# Patient Record
Sex: FEMALE | Race: WHITE | NOT HISPANIC OR LATINO | Employment: FULL TIME | URBAN - NONMETROPOLITAN AREA
[De-identification: names, ages, dates, MRNs, and addresses within clinical notes are randomized per-mention and may not be internally consistent; named-entity substitution may affect disease eponyms.]

---

## 2017-09-05 ENCOUNTER — OFFICE VISIT (OUTPATIENT)
Dept: OBSTETRICS AND GYNECOLOGY | Facility: CLINIC | Age: 37
End: 2017-09-05

## 2017-09-05 VITALS
BODY MASS INDEX: 25.16 KG/M2 | HEIGHT: 65 IN | WEIGHT: 151 LBS | DIASTOLIC BLOOD PRESSURE: 74 MMHG | SYSTOLIC BLOOD PRESSURE: 130 MMHG

## 2017-09-05 DIAGNOSIS — N89.8 VAGINAL CYST: Primary | ICD-10-CM

## 2017-09-05 PROCEDURE — 99213 OFFICE O/P EST LOW 20 MIN: CPT | Performed by: OBSTETRICS & GYNECOLOGY

## 2018-03-13 ENCOUNTER — TELEPHONE (OUTPATIENT)
Dept: NEUROSURGERY | Age: 38
End: 2018-03-13

## 2018-03-13 NOTE — TELEPHONE ENCOUNTER
Neurosurgical pre apt questionnaire     Referring physician? [facility]    Who is completing questionnaire? PATIENT     Has the pt had any previous spinal/brain surgeries? NO    If yes, Where was the surgery preformed? What was the surgery? Who was the surgeon? When was this surgery? Why is the pt not following up with previous surgeon? Is this a second opinion? Was a MRI preformed? NO    If not, Is there any reason a MRI cannot be performed? Is there metal anywhere in the body? If yes,  Where was the MRI preformed? What part of the body? When was it preformed? Note:If  was not the facility that performed the study,    the disc will need to be brought appoint. Physical Therapy? [facility]   If yes, where was PT completed? What is the duration of therapy? Pain Management? NO   If yes, where was pain mgt therapy? Is the patient still under contract with pain mgt? Who is the pain mgt physician? Is the pt currently taking anything for pain control? NOTHING     Employment Status ? EMPLOYED   What type of employment? [employer]   Has the patient missed work due to pain? 2 WEEKS FOR INITIAL FALL   If unemployed, how long? Are you on disability? Symptoms?         LEFT ARM AND HAND NUMBNESS, STEMS FROM BAD FALL IN [place]

## 2018-03-14 ENCOUNTER — TELEPHONE (OUTPATIENT)
Dept: NEUROSURGERY | Age: 38
End: 2018-03-14

## 2018-03-26 ENCOUNTER — OFFICE VISIT (OUTPATIENT)
Dept: NEUROSURGERY | Age: 38
End: 2018-03-26
Payer: COMMERCIAL

## 2018-03-26 VITALS
OXYGEN SATURATION: 100 % | SYSTOLIC BLOOD PRESSURE: 146 MMHG | WEIGHT: 155 LBS | DIASTOLIC BLOOD PRESSURE: 78 MMHG | BODY MASS INDEX: 25.83 KG/M2 | HEART RATE: 102 BPM | HEIGHT: 65 IN

## 2018-03-26 DIAGNOSIS — R41.840 DIFFICULTY CONCENTRATING: ICD-10-CM

## 2018-03-26 DIAGNOSIS — R20.2 NUMBNESS AND TINGLING IN LEFT HAND: ICD-10-CM

## 2018-03-26 DIAGNOSIS — M54.2 NECK PAIN: ICD-10-CM

## 2018-03-26 DIAGNOSIS — R51.9 FREQUENT HEADACHES: ICD-10-CM

## 2018-03-26 DIAGNOSIS — R20.0 NUMBNESS AND TINGLING IN LEFT HAND: ICD-10-CM

## 2018-03-26 DIAGNOSIS — H53.8 BLURRY VISION, BILATERAL: ICD-10-CM

## 2018-03-26 DIAGNOSIS — R20.0 LEFT ARM NUMBNESS: Primary | ICD-10-CM

## 2018-03-26 PROCEDURE — 99204 OFFICE O/P NEW MOD 45 MIN: CPT | Performed by: NURSE PRACTITIONER

## 2018-03-26 ASSESSMENT — ENCOUNTER SYMPTOMS
GASTROINTESTINAL NEGATIVE: 1
RESPIRATORY NEGATIVE: 1
ORTHOPNEA: 0
EYE PAIN: 0
BLURRED VISION: 1
DOUBLE VISION: 0
EYE REDNESS: 0
EYE DISCHARGE: 0
PHOTOPHOBIA: 0

## 2018-03-26 NOTE — PROGRESS NOTES
intact. No rash, erythema, or pallor. Psychiatric  mood, affect, and behavior appear normal.     Neurologic Examination  Awake, Alert and oriented x 3  Normal speech pattern, following commands  Motor 5/5 all extremities  No deficits to light touch or pinprick sensation  Reflexes are 3+ and symmetric BUE (slight)  No clonus or Hoffmans sign  No myofacial tenderness to palpation  Normal Gait pattern  Can walk in Tandem      DATA and IMAGING:    Nursing/pcp notes, imaging, labs, and vitals reviewed. PT,OT and/or speech notes reviewed    Lab Results   Component Value Date    HGB 11.1 (L) 01/03/2011    HCT 33.3 (L) 01/03/2011   No results found for: NA, K, CL, CO2, BUN, CREATININE, GLUCOSE, CALCIUM, PROT, LABALBU, BILITOT, ALKPHOS, AST, ALT, LABGLOM, GFRAA, AGRATIO, GLOBNo results found for: INR, PROTIME    No images to view at today's visit. ASSESSMENT:    Roslyn Vazquez is a 40 y.o. female with left arm pain, numbness, and tingling with mild neck pain. ICD-10-CM ICD-9-CM    1. Left arm numbness R20.0 782.0 MRI Cervical Spine WO Contrast   2. Numbness and tingling in left hand R20.0 782.0 MRI Cervical Spine WO Contrast    R20.2     3. Neck pain M54.2 723.1 MRI Cervical Spine WO Contrast   4. Frequent headaches R51 784.0 CT HEAD WO CONTRAST      MRI Cervical Spine WO Contrast   5. Blurry vision, bilateral H53.8 368.8 CT HEAD WO CONTRAST   6. Difficulty concentrating R41.840 799.51 CT HEAD WO CONTRAST       PLAN:  -Obtain MRI cervical (OPEN)  -Follow up in 1 months       Note: A total of >50% (23 minutes) of 45 minutes was spent discussing the pathophysiology and treatment and/or coordination of care of the above diagnoses.       JASWANT Rodas

## 2018-04-16 ENCOUNTER — TELEPHONE (OUTPATIENT)
Dept: NEUROSURGERY | Age: 38
End: 2018-04-16

## 2018-04-20 ENCOUNTER — TELEPHONE (OUTPATIENT)
Dept: NEUROSURGERY | Age: 38
End: 2018-04-20

## 2018-05-04 ENCOUNTER — HOSPITAL ENCOUNTER (OUTPATIENT)
Dept: MRI IMAGING | Age: 38
Discharge: HOME | End: 2018-05-04
Payer: COMMERCIAL

## 2018-05-04 ENCOUNTER — HOSPITAL ENCOUNTER (OUTPATIENT)
Dept: CT IMAGING | Age: 38
Discharge: HOME | End: 2018-05-04
Payer: COMMERCIAL

## 2018-05-04 DIAGNOSIS — R51.9 FREQUENT HEADACHES: ICD-10-CM

## 2018-05-04 DIAGNOSIS — H53.8 BLURRY VISION, BILATERAL: ICD-10-CM

## 2018-05-04 DIAGNOSIS — R20.0 NUMBNESS AND TINGLING IN LEFT HAND: ICD-10-CM

## 2018-05-04 DIAGNOSIS — R41.840 DIFFICULTY CONCENTRATING: ICD-10-CM

## 2018-05-04 DIAGNOSIS — M54.2 NECK PAIN: ICD-10-CM

## 2018-05-04 DIAGNOSIS — R20.2 NUMBNESS AND TINGLING IN LEFT HAND: ICD-10-CM

## 2018-05-04 DIAGNOSIS — R20.0 LEFT ARM NUMBNESS: ICD-10-CM

## 2018-05-04 PROCEDURE — 70450 CT HEAD/BRAIN W/O DYE: CPT

## 2018-05-04 PROCEDURE — 72141 MRI NECK SPINE W/O DYE: CPT

## 2018-05-08 ENCOUNTER — TELEPHONE (OUTPATIENT)
Dept: NEUROLOGY | Age: 38
End: 2018-05-08

## 2018-05-18 ENCOUNTER — TELEPHONE (OUTPATIENT)
Dept: NEUROSURGERY | Age: 38
End: 2018-05-18

## 2018-05-18 DIAGNOSIS — R20.0 NUMBNESS AND TINGLING IN LEFT HAND: ICD-10-CM

## 2018-05-18 DIAGNOSIS — H53.8 BLURRY VISION, BILATERAL: ICD-10-CM

## 2018-05-18 DIAGNOSIS — M54.2 NECK PAIN: ICD-10-CM

## 2018-05-18 DIAGNOSIS — R20.2 NUMBNESS AND TINGLING IN LEFT HAND: ICD-10-CM

## 2018-05-18 DIAGNOSIS — R41.840 DIFFICULTY CONCENTRATING: ICD-10-CM

## 2018-05-18 DIAGNOSIS — R20.0 LEFT ARM NUMBNESS: Primary | ICD-10-CM

## 2018-05-18 DIAGNOSIS — R51.9 FREQUENT HEADACHES: ICD-10-CM

## 2018-05-24 ENCOUNTER — TELEPHONE (OUTPATIENT)
Dept: NEUROSURGERY | Age: 38
End: 2018-05-24

## 2018-06-04 ENCOUNTER — OFFICE VISIT (OUTPATIENT)
Dept: NEUROSURGERY | Age: 38
End: 2018-06-04
Payer: COMMERCIAL

## 2018-06-04 ENCOUNTER — TELEPHONE (OUTPATIENT)
Dept: NEUROSURGERY | Age: 38
End: 2018-06-04

## 2018-06-04 ENCOUNTER — OFFICE VISIT (OUTPATIENT)
Dept: OBSTETRICS AND GYNECOLOGY | Facility: CLINIC | Age: 38
End: 2018-06-04

## 2018-06-04 VITALS
BODY MASS INDEX: 28.66 KG/M2 | DIASTOLIC BLOOD PRESSURE: 76 MMHG | HEIGHT: 65 IN | SYSTOLIC BLOOD PRESSURE: 120 MMHG | WEIGHT: 172 LBS

## 2018-06-04 VITALS
SYSTOLIC BLOOD PRESSURE: 122 MMHG | WEIGHT: 174.2 LBS | HEART RATE: 70 BPM | OXYGEN SATURATION: 99 % | DIASTOLIC BLOOD PRESSURE: 78 MMHG | HEIGHT: 65 IN | BODY MASS INDEX: 29.02 KG/M2

## 2018-06-04 DIAGNOSIS — S06.0X0A CONCUSSION WITHOUT LOSS OF CONSCIOUSNESS, INITIAL ENCOUNTER: Primary | ICD-10-CM

## 2018-06-04 DIAGNOSIS — R20.2 NUMBNESS AND TINGLING IN LEFT HAND: ICD-10-CM

## 2018-06-04 DIAGNOSIS — M54.2 NECK PAIN: ICD-10-CM

## 2018-06-04 DIAGNOSIS — R20.0 NUMBNESS AND TINGLING IN LEFT HAND: ICD-10-CM

## 2018-06-04 DIAGNOSIS — R51.9 FREQUENT HEADACHES: ICD-10-CM

## 2018-06-04 DIAGNOSIS — E53.8 B12 DEFICIENCY: Primary | ICD-10-CM

## 2018-06-04 DIAGNOSIS — S06.0X0A CONCUSSION WITHOUT LOSS OF CONSCIOUSNESS, INITIAL ENCOUNTER: ICD-10-CM

## 2018-06-04 DIAGNOSIS — R20.0 LEFT ARM NUMBNESS: ICD-10-CM

## 2018-06-04 DIAGNOSIS — R41.840 DIFFICULTY CONCENTRATING: ICD-10-CM

## 2018-06-04 DIAGNOSIS — H53.8 BLURRY VISION, BILATERAL: ICD-10-CM

## 2018-06-04 DIAGNOSIS — N89.8 VAGINAL INCLUSION CYST: Primary | ICD-10-CM

## 2018-06-04 LAB
ALBUMIN SERPL-MCNC: 4.8 G/DL (ref 3.5–5.2)
ALP BLD-CCNC: 49 U/L (ref 35–104)
ALT SERPL-CCNC: 11 U/L (ref 5–33)
ANION GAP SERPL CALCULATED.3IONS-SCNC: 17 MMOL/L (ref 7–19)
AST SERPL-CCNC: 13 U/L (ref 5–32)
BASOPHILS ABSOLUTE: 0 K/UL (ref 0–0.2)
BASOPHILS RELATIVE PERCENT: 0.7 % (ref 0–1)
BILIRUB SERPL-MCNC: 0.3 MG/DL (ref 0.2–1.2)
BUN BLDV-MCNC: 20 MG/DL (ref 6–20)
C-REACTIVE PROTEIN: 0.26 MG/DL (ref 0–0.5)
CALCIUM SERPL-MCNC: 9.7 MG/DL (ref 8.6–10)
CHLORIDE BLD-SCNC: 98 MMOL/L (ref 98–111)
CO2: 26 MMOL/L (ref 22–29)
CREAT SERPL-MCNC: 0.8 MG/DL (ref 0.5–0.9)
EOSINOPHILS ABSOLUTE: 0.1 K/UL (ref 0–0.6)
EOSINOPHILS RELATIVE PERCENT: 1.1 % (ref 0–5)
GFR NON-AFRICAN AMERICAN: >60
GLUCOSE BLD-MCNC: 97 MG/DL (ref 74–109)
HCT VFR BLD CALC: 41.2 % (ref 37–47)
HEMOGLOBIN: 13.4 G/DL (ref 12–16)
LYMPHOCYTES ABSOLUTE: 1.1 K/UL (ref 1.1–4.5)
LYMPHOCYTES RELATIVE PERCENT: 20 % (ref 20–40)
MCH RBC QN AUTO: 30.3 PG (ref 27–31)
MCHC RBC AUTO-ENTMCNC: 32.5 G/DL (ref 33–37)
MCV RBC AUTO: 93.2 FL (ref 81–99)
MONOCYTES ABSOLUTE: 0.3 K/UL (ref 0–0.9)
MONOCYTES RELATIVE PERCENT: 6 % (ref 0–10)
NEUTROPHILS ABSOLUTE: 4 K/UL (ref 1.5–7.5)
NEUTROPHILS RELATIVE PERCENT: 71.7 % (ref 50–65)
PDW BLD-RTO: 11.8 % (ref 11.5–14.5)
PLATELET # BLD: 273 K/UL (ref 130–400)
PMV BLD AUTO: 10.2 FL (ref 9.4–12.3)
POTASSIUM SERPL-SCNC: 3.7 MMOL/L (ref 3.5–5)
RBC # BLD: 4.42 M/UL (ref 4.2–5.4)
RHEUMATOID FACTOR: <10 IU/ML
RPR: NORMAL
SEDIMENTATION RATE, ERYTHROCYTE: 11 MM/HR (ref 0–20)
SODIUM BLD-SCNC: 141 MMOL/L (ref 136–145)
T4 FREE: 1.1 NG/DL (ref 0.9–1.7)
TOTAL PROTEIN: 7.9 G/DL (ref 6.6–8.7)
TSH SERPL DL<=0.05 MIU/L-ACNC: 1.21 UIU/ML (ref 0.27–4.2)
VITAMIN B-12: 321 PG/ML (ref 211–946)
WBC # BLD: 5.5 K/UL (ref 4.8–10.8)

## 2018-06-04 PROCEDURE — 99213 OFFICE O/P EST LOW 20 MIN: CPT | Performed by: NURSE PRACTITIONER

## 2018-06-04 PROCEDURE — 99204 OFFICE O/P NEW MOD 45 MIN: CPT | Performed by: PSYCHIATRY & NEUROLOGY

## 2018-06-04 RX ORDER — CYANOCOBALAMIN 1000 UG/ML
1000 INJECTION INTRAMUSCULAR; SUBCUTANEOUS
Qty: 1 ML | Refills: 3 | Status: SHIPPED | OUTPATIENT
Start: 2018-06-04 | End: 2018-07-09

## 2018-06-04 RX ORDER — CEPHALEXIN 500 MG/1
500 CAPSULE ORAL 4 TIMES DAILY
Qty: 40 CAPSULE | Refills: 0 | Status: SHIPPED | OUTPATIENT
Start: 2018-06-04 | End: 2018-06-14

## 2018-06-06 LAB — ANA IGG, ELISA: DETECTED

## 2018-06-07 LAB
ANA BY IFA: ABNORMAL
ARSENIC BLOOD: <10 UG/L (ref 0–13)
LEAD LEVEL BLOOD: <2 UG/DL (ref 0–4.9)
MERCURY BLOOD: <3 UG/L (ref 0–10)

## 2018-06-14 ENCOUNTER — HOSPITAL ENCOUNTER (OUTPATIENT)
Dept: MRI IMAGING | Age: 38
Discharge: HOME | End: 2018-06-14
Payer: COMMERCIAL

## 2018-06-14 ENCOUNTER — HOSPITAL ENCOUNTER (OUTPATIENT)
Dept: NEUROLOGY | Age: 38
Discharge: HOME | End: 2018-06-14
Payer: COMMERCIAL

## 2018-06-14 DIAGNOSIS — R41.840 DIFFICULTY CONCENTRATING: ICD-10-CM

## 2018-06-14 DIAGNOSIS — M54.2 NECK PAIN: ICD-10-CM

## 2018-06-14 DIAGNOSIS — R20.2 NUMBNESS AND TINGLING IN LEFT HAND: ICD-10-CM

## 2018-06-14 DIAGNOSIS — H53.8 BLURRY VISION, BILATERAL: ICD-10-CM

## 2018-06-14 DIAGNOSIS — R20.0 LEFT ARM NUMBNESS: ICD-10-CM

## 2018-06-14 DIAGNOSIS — R51.9 FREQUENT HEADACHES: ICD-10-CM

## 2018-06-14 DIAGNOSIS — S06.0X0A CONCUSSION WITHOUT LOSS OF CONSCIOUSNESS, INITIAL ENCOUNTER: ICD-10-CM

## 2018-06-14 DIAGNOSIS — R20.0 NUMBNESS AND TINGLING IN LEFT HAND: ICD-10-CM

## 2018-06-14 PROCEDURE — A9577 INJ MULTIHANCE: HCPCS | Performed by: PSYCHIATRY & NEUROLOGY

## 2018-06-14 PROCEDURE — 95913 NRV CNDJ TEST 13/> STUDIES: CPT

## 2018-06-14 PROCEDURE — 70553 MRI BRAIN STEM W/O & W/DYE: CPT

## 2018-06-14 PROCEDURE — 95886 MUSC TEST DONE W/N TEST COMP: CPT

## 2018-06-14 PROCEDURE — 6360000004 HC RX CONTRAST MEDICATION: Performed by: PSYCHIATRY & NEUROLOGY

## 2018-06-14 PROCEDURE — 95913 NRV CNDJ TEST 13/> STUDIES: CPT | Performed by: PSYCHIATRY & NEUROLOGY

## 2018-06-14 PROCEDURE — 95886 MUSC TEST DONE W/N TEST COMP: CPT | Performed by: PSYCHIATRY & NEUROLOGY

## 2018-06-14 RX ADMIN — GADOBENATE DIMEGLUMINE 16 ML: 529 INJECTION, SOLUTION INTRAVENOUS at 12:18

## 2018-06-15 ENCOUNTER — TELEPHONE (OUTPATIENT)
Dept: CARDIOLOGY | Age: 38
End: 2018-06-15

## 2018-06-27 ENCOUNTER — OFFICE VISIT (OUTPATIENT)
Dept: OBSTETRICS AND GYNECOLOGY | Facility: CLINIC | Age: 38
End: 2018-06-27

## 2018-06-27 VITALS
SYSTOLIC BLOOD PRESSURE: 120 MMHG | BODY MASS INDEX: 29.16 KG/M2 | HEIGHT: 65 IN | WEIGHT: 175 LBS | DIASTOLIC BLOOD PRESSURE: 76 MMHG

## 2018-06-27 DIAGNOSIS — Z15.09 GENETIC PREDISPOSITION TO CANCER: ICD-10-CM

## 2018-06-27 DIAGNOSIS — Z12.39 ENCOUNTER FOR SCREENING FOR MALIGNANT NEOPLASM OF BREAST: ICD-10-CM

## 2018-06-27 DIAGNOSIS — Z01.419 WELL WOMAN EXAM WITH ROUTINE GYNECOLOGICAL EXAM: Primary | ICD-10-CM

## 2018-06-27 DIAGNOSIS — Z12.4 CERVICAL CANCER SCREENING: ICD-10-CM

## 2018-06-27 DIAGNOSIS — N89.8 VAGINAL INCLUSION CYST: ICD-10-CM

## 2018-06-27 PROCEDURE — 99395 PREV VISIT EST AGE 18-39: CPT | Performed by: NURSE PRACTITIONER

## 2018-06-27 PROCEDURE — G0123 SCREEN CERV/VAG THIN LAYER: HCPCS | Performed by: NURSE PRACTITIONER

## 2018-06-27 PROCEDURE — 99213 OFFICE O/P EST LOW 20 MIN: CPT | Performed by: NURSE PRACTITIONER

## 2018-06-27 RX ORDER — SYRINGE AND NEEDLE,INSULIN,1ML 25GX1"
SYRINGE, EMPTY DISPOSABLE MISCELLANEOUS
COMMUNITY
Start: 2018-06-05

## 2018-06-27 RX ORDER — CYANOCOBALAMIN 1000 UG/ML
INJECTION, SOLUTION INTRAMUSCULAR; SUBCUTANEOUS
COMMUNITY
Start: 2018-06-05

## 2018-06-27 RX ORDER — CEPHALEXIN 500 MG/1
500 CAPSULE ORAL 4 TIMES DAILY
Qty: 40 CAPSULE | Refills: 0 | Status: SHIPPED | OUTPATIENT
Start: 2018-06-27 | End: 2018-07-07

## 2018-06-27 RX ORDER — CEPHALEXIN 500 MG/1
500 CAPSULE ORAL 4 TIMES DAILY
Qty: 40 CAPSULE | Refills: 0 | Status: SHIPPED | OUTPATIENT
Start: 2018-06-27 | End: 2018-06-27 | Stop reason: SDUPTHER

## 2018-06-29 LAB
GEN CATEG CVX/VAG CYTO-IMP: NORMAL
LAB AP CASE REPORT: NORMAL
LAB AP GYN ADDITIONAL INFORMATION: NORMAL
PATH INTERP SPEC-IMP: NORMAL
STAT OF ADQ CVX/VAG CYTO-IMP: NORMAL

## 2018-07-09 ENCOUNTER — OFFICE VISIT (OUTPATIENT)
Dept: NEUROSURGERY | Age: 38
End: 2018-07-09
Payer: COMMERCIAL

## 2018-07-09 VITALS
SYSTOLIC BLOOD PRESSURE: 108 MMHG | WEIGHT: 178 LBS | OXYGEN SATURATION: 98 % | HEIGHT: 65 IN | BODY MASS INDEX: 29.66 KG/M2 | DIASTOLIC BLOOD PRESSURE: 64 MMHG | HEART RATE: 74 BPM

## 2018-07-09 DIAGNOSIS — F41.9 ANXIETY: ICD-10-CM

## 2018-07-09 DIAGNOSIS — R41.840 DIFFICULTY CONCENTRATING: ICD-10-CM

## 2018-07-09 DIAGNOSIS — R51.9 FREQUENT HEADACHES: ICD-10-CM

## 2018-07-09 DIAGNOSIS — H53.8 BLURRY VISION, BILATERAL: ICD-10-CM

## 2018-07-09 DIAGNOSIS — R20.0 LEFT ARM NUMBNESS: ICD-10-CM

## 2018-07-09 DIAGNOSIS — E53.8 B12 DEFICIENCY: ICD-10-CM

## 2018-07-09 DIAGNOSIS — S06.0X0A CONCUSSION WITHOUT LOSS OF CONSCIOUSNESS, INITIAL ENCOUNTER: Primary | ICD-10-CM

## 2018-07-09 DIAGNOSIS — R20.2 NUMBNESS AND TINGLING IN LEFT HAND: ICD-10-CM

## 2018-07-09 DIAGNOSIS — R20.0 NUMBNESS AND TINGLING IN LEFT HAND: ICD-10-CM

## 2018-07-09 DIAGNOSIS — M54.2 NECK PAIN: ICD-10-CM

## 2018-07-09 PROCEDURE — 99214 OFFICE O/P EST MOD 30 MIN: CPT | Performed by: PSYCHIATRY & NEUROLOGY

## 2018-07-09 RX ORDER — ESCITALOPRAM OXALATE 10 MG/1
10 TABLET ORAL DAILY
Qty: 30 TABLET | Refills: 5 | Status: SHIPPED | OUTPATIENT
Start: 2018-07-09

## 2018-07-09 NOTE — PROGRESS NOTES
labs. Noting more anxiety, no SI/HI. PLAN:  1. Trial of Lexapro 10 mg daily. Psych referral, she may also have possible underlying ADD. 2.  Hold off on Rheumatology evaluation for now as CTD felt unlikely. 3.  B12 replacement.       Polo Norris,   Board Certified Neurology

## 2018-07-17 ENCOUNTER — HOSPITAL ENCOUNTER (OUTPATIENT)
Dept: MAMMOGRAPHY | Facility: HOSPITAL | Age: 38
Discharge: HOME | End: 2018-07-17
Admitting: NURSE PRACTITIONER

## 2018-07-17 PROCEDURE — 77063 BREAST TOMOSYNTHESIS BI: CPT

## 2018-07-17 PROCEDURE — 77067 SCR MAMMO BI INCL CAD: CPT

## 2020-03-12 ENCOUNTER — HOSPITAL ENCOUNTER (EMERGENCY)
Facility: HOSPITAL | Age: 40
Discharge: HOME | End: 2020-03-12
Admitting: EMERGENCY MEDICINE

## 2020-03-12 ENCOUNTER — APPOINTMENT (OUTPATIENT)
Dept: GENERAL RADIOLOGY | Facility: HOSPITAL | Age: 40
End: 2020-03-12

## 2020-03-12 VITALS
WEIGHT: 175 LBS | TEMPERATURE: 98.4 F | RESPIRATION RATE: 16 BRPM | SYSTOLIC BLOOD PRESSURE: 124 MMHG | BODY MASS INDEX: 29.16 KG/M2 | HEART RATE: 71 BPM | OXYGEN SATURATION: 96 % | HEIGHT: 65 IN | DIASTOLIC BLOOD PRESSURE: 75 MMHG

## 2020-03-12 DIAGNOSIS — R07.9 CHEST PAIN, UNSPECIFIED TYPE: Primary | ICD-10-CM

## 2020-03-12 LAB
ALBUMIN SERPL-MCNC: 4.5 G/DL (ref 3.5–5.2)
ALBUMIN/GLOB SERPL: 1.4 G/DL
ALP SERPL-CCNC: 99 U/L (ref 39–117)
ALT SERPL W P-5'-P-CCNC: 11 U/L (ref 1–33)
ANION GAP SERPL CALCULATED.3IONS-SCNC: 12 MMOL/L (ref 5–15)
APTT PPP: 31.8 SECONDS (ref 24.1–35)
AST SERPL-CCNC: 15 U/L (ref 1–32)
BASOPHILS # BLD AUTO: 0.06 10*3/MM3 (ref 0–0.2)
BASOPHILS NFR BLD AUTO: 1.2 % (ref 0–1.5)
BILIRUB SERPL-MCNC: 0.3 MG/DL (ref 0.2–1.2)
BUN BLD-MCNC: 15 MG/DL (ref 6–20)
BUN/CREAT SERPL: 20 (ref 7–25)
CALCIUM SPEC-SCNC: 9.9 MG/DL (ref 8.6–10.5)
CHLORIDE SERPL-SCNC: 100 MMOL/L (ref 98–107)
CO2 SERPL-SCNC: 28 MMOL/L (ref 22–29)
CREAT BLD-MCNC: 0.75 MG/DL (ref 0.57–1)
D DIMER PPP FEU-MCNC: 0.39 MG/L (FEU) (ref 0–0.5)
DEPRECATED RDW RBC AUTO: 38.7 FL (ref 37–54)
EOSINOPHIL # BLD AUTO: 0.12 10*3/MM3 (ref 0–0.4)
EOSINOPHIL NFR BLD AUTO: 2.5 % (ref 0.3–6.2)
ERYTHROCYTE [DISTWIDTH] IN BLOOD BY AUTOMATED COUNT: 11.9 % (ref 12.3–15.4)
GFR SERPL CREATININE-BSD FRML MDRD: 86 ML/MIN/1.73
GLOBULIN UR ELPH-MCNC: 3.3 GM/DL
GLUCOSE BLD-MCNC: 102 MG/DL (ref 65–99)
HCG SERPL QL: NEGATIVE
HCT VFR BLD AUTO: 38.4 % (ref 34–46.6)
HGB BLD-MCNC: 13 G/DL (ref 12–15.9)
HOLD SPECIMEN: NORMAL
HOLD SPECIMEN: NORMAL
IMM GRANULOCYTES # BLD AUTO: 0.01 10*3/MM3 (ref 0–0.05)
IMM GRANULOCYTES NFR BLD AUTO: 0.2 % (ref 0–0.5)
INR PPP: 0.97 (ref 0.91–1.09)
LYMPHOCYTES # BLD AUTO: 1.76 10*3/MM3 (ref 0.7–3.1)
LYMPHOCYTES NFR BLD AUTO: 36.6 % (ref 19.6–45.3)
MCH RBC QN AUTO: 30.4 PG (ref 26.6–33)
MCHC RBC AUTO-ENTMCNC: 33.9 G/DL (ref 31.5–35.7)
MCV RBC AUTO: 89.7 FL (ref 79–97)
MONOCYTES # BLD AUTO: 0.28 10*3/MM3 (ref 0.1–0.9)
MONOCYTES NFR BLD AUTO: 5.8 % (ref 5–12)
NEUTROPHILS # BLD AUTO: 2.58 10*3/MM3 (ref 1.7–7)
NEUTROPHILS NFR BLD AUTO: 53.7 % (ref 42.7–76)
NRBC BLD AUTO-RTO: 0 /100 WBC (ref 0–0.2)
NT-PROBNP SERPL-MCNC: 39 PG/ML (ref 5–450)
PLATELET # BLD AUTO: 314 10*3/MM3 (ref 140–450)
PMV BLD AUTO: 9.3 FL (ref 6–12)
POTASSIUM BLD-SCNC: 3.3 MMOL/L (ref 3.5–5.2)
PROT SERPL-MCNC: 7.8 G/DL (ref 6–8.5)
PROTHROMBIN TIME: 12.8 SECONDS (ref 11.9–14.6)
RBC # BLD AUTO: 4.28 10*6/MM3 (ref 3.77–5.28)
SODIUM BLD-SCNC: 140 MMOL/L (ref 136–145)
TROPONIN T SERPL-MCNC: <0.01 NG/ML (ref 0–0.03)
TROPONIN T SERPL-MCNC: <0.01 NG/ML (ref 0–0.03)
WBC NRBC COR # BLD: 4.81 10*3/MM3 (ref 3.4–10.8)
WHOLE BLOOD HOLD SPECIMEN: NORMAL
WHOLE BLOOD HOLD SPECIMEN: NORMAL

## 2020-03-12 PROCEDURE — 93010 ELECTROCARDIOGRAM REPORT: CPT | Performed by: INTERNAL MEDICINE

## 2020-03-12 PROCEDURE — 96374 THER/PROPH/DIAG INJ IV PUSH: CPT

## 2020-03-12 PROCEDURE — 80053 COMPREHEN METABOLIC PANEL: CPT | Performed by: NURSE PRACTITIONER

## 2020-03-12 PROCEDURE — 85610 PROTHROMBIN TIME: CPT | Performed by: NURSE PRACTITIONER

## 2020-03-12 PROCEDURE — 85730 THROMBOPLASTIN TIME PARTIAL: CPT | Performed by: NURSE PRACTITIONER

## 2020-03-12 PROCEDURE — 93005 ELECTROCARDIOGRAM TRACING: CPT | Performed by: EMERGENCY MEDICINE

## 2020-03-12 PROCEDURE — 99284 EMERGENCY DEPT VISIT MOD MDM: CPT

## 2020-03-12 PROCEDURE — 84703 CHORIONIC GONADOTROPIN ASSAY: CPT

## 2020-03-12 PROCEDURE — 93005 ELECTROCARDIOGRAM TRACING: CPT | Performed by: NURSE PRACTITIONER

## 2020-03-12 PROCEDURE — 85025 COMPLETE CBC W/AUTO DIFF WBC: CPT | Performed by: NURSE PRACTITIONER

## 2020-03-12 PROCEDURE — 71045 X-RAY EXAM CHEST 1 VIEW: CPT

## 2020-03-12 PROCEDURE — 83880 ASSAY OF NATRIURETIC PEPTIDE: CPT | Performed by: NURSE PRACTITIONER

## 2020-03-12 PROCEDURE — 85379 FIBRIN DEGRADATION QUANT: CPT | Performed by: NURSE PRACTITIONER

## 2020-03-12 PROCEDURE — 84484 ASSAY OF TROPONIN QUANT: CPT | Performed by: NURSE PRACTITIONER

## 2020-03-12 PROCEDURE — 25010000002 METOCLOPRAMIDE PER 10 MG: Performed by: PHYSICIAN ASSISTANT

## 2020-03-12 RX ORDER — METOCLOPRAMIDE HYDROCHLORIDE 5 MG/ML
10 INJECTION INTRAMUSCULAR; INTRAVENOUS ONCE
Status: COMPLETED | OUTPATIENT
Start: 2020-03-12 | End: 2020-03-12

## 2020-03-12 RX ORDER — ACETAMINOPHEN 500 MG
1000 TABLET ORAL ONCE
Status: COMPLETED | OUTPATIENT
Start: 2020-03-12 | End: 2020-03-12

## 2020-03-12 RX ADMIN — METOCLOPRAMIDE HYDROCHLORIDE 10 MG: 5 INJECTION INTRAMUSCULAR; INTRAVENOUS at 17:53

## 2020-03-12 RX ADMIN — ACETAMINOPHEN 1000 MG: 500 TABLET, FILM COATED ORAL at 15:44

## 2020-05-04 ENCOUNTER — APPOINTMENT (OUTPATIENT)
Dept: LAB | Facility: HOSPITAL | Age: 40
End: 2020-05-04

## 2020-05-04 ENCOUNTER — OFFICE VISIT (OUTPATIENT)
Dept: ENDOCRINOLOGY | Facility: CLINIC | Age: 40
End: 2020-05-04

## 2020-05-04 VITALS
SYSTOLIC BLOOD PRESSURE: 124 MMHG | DIASTOLIC BLOOD PRESSURE: 78 MMHG | HEIGHT: 65 IN | WEIGHT: 179.5 LBS | BODY MASS INDEX: 29.91 KG/M2

## 2020-05-04 DIAGNOSIS — M84.353A STRESS FRACTURE OF NECK OF FEMUR, INITIAL ENCOUNTER: Primary | ICD-10-CM

## 2020-05-04 LAB
ALP SERPL-CCNC: 67 U/L (ref 39–117)
CALCIUM SPEC-SCNC: 9.7 MG/DL (ref 8.6–10.5)
CORTIS AM PEAK SERPL-MCNC: 10.46 MCG/DL
ESTRADIOL SERPL HS-MCNC: 99 PG/ML
FSH SERPL-ACNC: 7.42 MIU/ML
LH SERPL-ACNC: 11.4 MIU/ML
MAGNESIUM SERPL-MCNC: 1.9 MG/DL (ref 1.6–2.6)
PHOSPHATE SERPL-MCNC: 7.4 MG/DL (ref 2.5–4.5)
PROLACTIN SERPL-MCNC: 10.2 NG/ML (ref 4.79–23.3)
PTH-INTACT SERPL-MCNC: 52.9 PG/ML (ref 15–65)
T4 FREE SERPL-MCNC: 1.11 NG/DL (ref 0.93–1.7)
TSH SERPL DL<=0.05 MIU/L-ACNC: 0.76 UIU/ML (ref 0.27–4.2)

## 2020-05-04 PROCEDURE — 86376 MICROSOMAL ANTIBODY EACH: CPT | Performed by: INTERNAL MEDICINE

## 2020-05-04 PROCEDURE — 82670 ASSAY OF TOTAL ESTRADIOL: CPT | Performed by: INTERNAL MEDICINE

## 2020-05-04 PROCEDURE — 84100 ASSAY OF PHOSPHORUS: CPT | Performed by: INTERNAL MEDICINE

## 2020-05-04 PROCEDURE — 84439 ASSAY OF FREE THYROXINE: CPT | Performed by: INTERNAL MEDICINE

## 2020-05-04 PROCEDURE — 84305 ASSAY OF SOMATOMEDIN: CPT | Performed by: INTERNAL MEDICINE

## 2020-05-04 PROCEDURE — 83937 ASSAY OF OSTEOCALCIN: CPT | Performed by: INTERNAL MEDICINE

## 2020-05-04 PROCEDURE — 84146 ASSAY OF PROLACTIN: CPT | Performed by: INTERNAL MEDICINE

## 2020-05-04 PROCEDURE — 82533 TOTAL CORTISOL: CPT | Performed by: INTERNAL MEDICINE

## 2020-05-04 PROCEDURE — 84075 ASSAY ALKALINE PHOSPHATASE: CPT | Performed by: INTERNAL MEDICINE

## 2020-05-04 PROCEDURE — 82652 VIT D 1 25-DIHYDROXY: CPT | Performed by: INTERNAL MEDICINE

## 2020-05-04 PROCEDURE — 84443 ASSAY THYROID STIM HORMONE: CPT | Performed by: INTERNAL MEDICINE

## 2020-05-04 PROCEDURE — 82627 DEHYDROEPIANDROSTERONE: CPT | Performed by: INTERNAL MEDICINE

## 2020-05-04 PROCEDURE — 83001 ASSAY OF GONADOTROPIN (FSH): CPT | Performed by: INTERNAL MEDICINE

## 2020-05-04 PROCEDURE — 83970 ASSAY OF PARATHORMONE: CPT | Performed by: INTERNAL MEDICINE

## 2020-05-04 PROCEDURE — 82523 COLLAGEN CROSSLINKS: CPT | Performed by: INTERNAL MEDICINE

## 2020-05-04 PROCEDURE — 82310 ASSAY OF CALCIUM: CPT | Performed by: INTERNAL MEDICINE

## 2020-05-04 PROCEDURE — 83735 ASSAY OF MAGNESIUM: CPT | Performed by: INTERNAL MEDICINE

## 2020-05-04 PROCEDURE — 82024 ASSAY OF ACTH: CPT | Performed by: INTERNAL MEDICINE

## 2020-05-04 PROCEDURE — 99244 OFF/OP CNSLTJ NEW/EST MOD 40: CPT | Performed by: INTERNAL MEDICINE

## 2020-05-04 PROCEDURE — 36415 COLL VENOUS BLD VENIPUNCTURE: CPT | Performed by: INTERNAL MEDICINE

## 2020-05-04 PROCEDURE — 84403 ASSAY OF TOTAL TESTOSTERONE: CPT | Performed by: INTERNAL MEDICINE

## 2020-05-04 PROCEDURE — 84482 T3 REVERSE: CPT | Performed by: INTERNAL MEDICINE

## 2020-05-04 PROCEDURE — 83002 ASSAY OF GONADOTROPIN (LH): CPT | Performed by: INTERNAL MEDICINE

## 2020-05-04 RX ORDER — SERTRALINE HYDROCHLORIDE 100 MG/1
TABLET, FILM COATED ORAL
COMMUNITY
Start: 2020-04-21 | End: 2021-05-05

## 2020-05-04 RX ORDER — OMEPRAZOLE 20 MG/1
CAPSULE, DELAYED RELEASE ORAL
COMMUNITY

## 2020-05-04 RX ORDER — LISDEXAMFETAMINE DIMESYLATE 50 MG
CAPSULE ORAL
COMMUNITY
Start: 2020-04-09 | End: 2021-05-05

## 2020-05-05 LAB
ACTH PLAS-MCNC: 29.4 PG/ML (ref 7.2–63.3)
DHEA-S SERPL-MCNC: 130 UG/DL (ref 57.3–279.2)
THYROPEROXIDASE AB SERPL-ACNC: 18 IU/ML (ref 0–34)

## 2020-05-06 ENCOUNTER — TELEPHONE (OUTPATIENT)
Dept: ENDOCRINOLOGY | Facility: CLINIC | Age: 40
End: 2020-05-06

## 2020-05-06 PROBLEM — M80.00XA OSTEOPOROSIS WITH CURRENT PATHOLOGICAL FRACTURE: Status: ACTIVE | Noted: 2020-05-06

## 2020-05-06 LAB
1,25(OH)2D3 SERPL-MCNC: 26.8 PG/ML (ref 19.9–79.3)
IGF-I SERPL-MCNC: 192 NG/ML (ref 69–227)
TESTOST SERPL-MCNC: 29.1 NG/DL (ref 10–55)

## 2020-05-07 LAB
COLLAGEN NTX SER-SCNC: 11.8 NMOL BCE/L (ref 6.2–19)
OSTEOCALCIN SERPL-MCNC: 14.1 NG/ML
T3REVERSE SERPL-MCNC: 15.2 NG/DL (ref 9.2–24.1)

## 2020-05-08 PROCEDURE — 82530 CORTISOL FREE: CPT | Performed by: INTERNAL MEDICINE

## 2020-05-08 PROCEDURE — 82523 COLLAGEN CROSSLINKS: CPT | Performed by: INTERNAL MEDICINE

## 2020-05-08 PROCEDURE — 82570 ASSAY OF URINE CREATININE: CPT | Performed by: INTERNAL MEDICINE

## 2020-05-08 PROCEDURE — 81050 URINALYSIS VOLUME MEASURE: CPT | Performed by: INTERNAL MEDICINE

## 2020-05-08 PROCEDURE — 82533 TOTAL CORTISOL: CPT | Performed by: INTERNAL MEDICINE

## 2020-05-09 LAB
COLLECT DURATION TIME UR: 24 HRS
CREAT UR-MCNC: 136.8 MG/DL
CREATINE 24H UR-MRATE: 0.75 G/24 HR (ref 0.7–1.6)
SPECIMEN VOL 24H UR: 550 ML

## 2020-05-10 DIAGNOSIS — M81.0 OSTEOPOROSIS, UNSPECIFIED OSTEOPOROSIS TYPE, UNSPECIFIED PATHOLOGICAL FRACTURE PRESENCE: Primary | ICD-10-CM

## 2020-05-11 LAB
COLLAGEN NTX UR-SCNC: 770 NMOL BCE
COLLAGEN NTX/CREAT UR-SRTO: 60 NM BCE/MM CR (ref 0–64)
CREAT 24H UR-MCNC: 144.1 MG/DL
INTERPRETIVE GUIDE:: NORMAL

## 2020-05-12 LAB
CORTIS F 24H UR-MRATE: 12 UG/24 HR (ref 6–42)
CORTIS F UR-MCNC: 21 UG/L
CORTIS SAL-MCNC: 0.04 UG/DL
SALIVARY CORTISOL #2: 0.02 UG/DL
SALIVARY CORTISOL #3: 0.05 UG/DL

## 2020-05-14 ENCOUNTER — LAB REQUISITION (OUTPATIENT)
Dept: LAB | Facility: HOSPITAL | Age: 40
End: 2020-05-14

## 2020-05-14 DIAGNOSIS — Z00.00 ENCOUNTER FOR GENERAL ADULT MEDICAL EXAMINATION WITHOUT ABNORMAL FINDINGS: ICD-10-CM

## 2020-05-14 LAB
CALCIUM 24H UR-MCNC: 7.3 MG/DL
CALCIUM 24H UR-MRATE: 146 MG/24 HR (ref 100–300)
COLLECT DURATION TIME UR: 24 HRS
SPECIMEN VOL 24H UR: 2000 ML

## 2020-05-14 PROCEDURE — 81050 URINALYSIS VOLUME MEASURE: CPT | Performed by: INTERNAL MEDICINE

## 2020-05-14 PROCEDURE — 82340 ASSAY OF CALCIUM IN URINE: CPT | Performed by: INTERNAL MEDICINE

## 2021-02-07 ENCOUNTER — TELEPHONE (OUTPATIENT)
Dept: GASTROENTEROLOGY | Facility: CLINIC | Age: 41
End: 2021-02-07

## 2021-02-07 DIAGNOSIS — K64.9 HEMORRHOIDS, UNSPECIFIED HEMORRHOID TYPE: Primary | ICD-10-CM

## 2021-02-07 RX ORDER — HYDROCORTISONE 25 MG/G
CREAM TOPICAL 2 TIMES DAILY
Qty: 28 G | Refills: 3 | Status: SHIPPED | OUTPATIENT
Start: 2021-02-07 | End: 2021-05-05

## 2021-05-05 ENCOUNTER — OFFICE VISIT (OUTPATIENT)
Dept: GASTROENTEROLOGY | Facility: CLINIC | Age: 41
End: 2021-05-05

## 2021-05-05 VITALS
OXYGEN SATURATION: 98 % | BODY MASS INDEX: 31.32 KG/M2 | HEART RATE: 103 BPM | WEIGHT: 188 LBS | HEIGHT: 65 IN | TEMPERATURE: 97.7 F | DIASTOLIC BLOOD PRESSURE: 72 MMHG | SYSTOLIC BLOOD PRESSURE: 128 MMHG

## 2021-05-05 DIAGNOSIS — R19.4 CHANGE IN BOWEL HABITS: Primary | ICD-10-CM

## 2021-05-05 DIAGNOSIS — Z80.0 FAMILY HX OF COLON CANCER: ICD-10-CM

## 2021-05-05 DIAGNOSIS — Z78.9 NONSMOKER: ICD-10-CM

## 2021-05-05 DIAGNOSIS — K62.5 BRBPR (BRIGHT RED BLOOD PER RECTUM): ICD-10-CM

## 2021-05-05 DIAGNOSIS — Z71.6 TOBACCO ABUSE COUNSELING: ICD-10-CM

## 2021-05-05 DIAGNOSIS — E66.9 OBESITY, UNSPECIFIED OBESITY SEVERITY, UNSPECIFIED OBESITY TYPE: ICD-10-CM

## 2021-05-05 PROCEDURE — 99214 OFFICE O/P EST MOD 30 MIN: CPT | Performed by: CLINICAL NURSE SPECIALIST

## 2021-05-05 RX ORDER — HYDROXYZINE HYDROCHLORIDE 25 MG/1
25 TABLET, FILM COATED ORAL
COMMUNITY

## 2021-05-05 RX ORDER — ESCITALOPRAM OXALATE 20 MG/1
20 TABLET ORAL DAILY
COMMUNITY
End: 2022-07-13

## 2021-05-05 RX ORDER — DEXTROAMPHETAMINE SACCHARATE, AMPHETAMINE ASPARTATE MONOHYDRATE, DEXTROAMPHETAMINE SULFATE AND AMPHETAMINE SULFATE 7.5; 7.5; 7.5; 7.5 MG/1; MG/1; MG/1; MG/1
30 CAPSULE, EXTENDED RELEASE ORAL EVERY MORNING
COMMUNITY
End: 2022-07-13

## 2021-05-05 RX ORDER — HYDROCORTISONE ACETATE PRAMOXINE HCL 1; 1 G/100G; G/100G
CREAM TOPICAL NIGHTLY
COMMUNITY
End: 2022-03-11 | Stop reason: SDUPTHER

## 2021-05-07 PROBLEM — R19.4 CHANGE IN BOWEL HABITS: Status: ACTIVE | Noted: 2021-05-07

## 2021-05-21 ENCOUNTER — HOSPITAL ENCOUNTER (OUTPATIENT)
Facility: HOSPITAL | Age: 41
Setting detail: HOSPITAL OUTPATIENT SURGERY
Discharge: HOME | End: 2021-05-21
Attending: INTERNAL MEDICINE | Admitting: INTERNAL MEDICINE

## 2021-05-21 ENCOUNTER — ANESTHESIA (OUTPATIENT)
Dept: GASTROENTEROLOGY | Facility: HOSPITAL | Age: 41
End: 2021-05-21

## 2021-05-21 ENCOUNTER — ANESTHESIA EVENT (OUTPATIENT)
Dept: GASTROENTEROLOGY | Facility: HOSPITAL | Age: 41
End: 2021-05-21

## 2021-05-21 VITALS
OXYGEN SATURATION: 100 % | RESPIRATION RATE: 18 BRPM | HEIGHT: 65 IN | BODY MASS INDEX: 30.32 KG/M2 | TEMPERATURE: 97 F | HEART RATE: 62 BPM | DIASTOLIC BLOOD PRESSURE: 82 MMHG | WEIGHT: 182 LBS | SYSTOLIC BLOOD PRESSURE: 117 MMHG

## 2021-05-21 DIAGNOSIS — R19.4 CHANGE IN BOWEL HABITS: ICD-10-CM

## 2021-05-21 LAB — B-HCG UR QL: NEGATIVE

## 2021-05-21 PROCEDURE — 81025 URINE PREGNANCY TEST: CPT | Performed by: ANESTHESIOLOGY

## 2021-05-21 PROCEDURE — 25010000002 PROPOFOL 10 MG/ML EMULSION: Performed by: NURSE ANESTHETIST, CERTIFIED REGISTERED

## 2021-05-21 PROCEDURE — 45385 COLONOSCOPY W/LESION REMOVAL: CPT | Performed by: INTERNAL MEDICINE

## 2021-05-21 PROCEDURE — 88305 TISSUE EXAM BY PATHOLOGIST: CPT | Performed by: INTERNAL MEDICINE

## 2021-05-21 RX ORDER — PROPOFOL 10 MG/ML
VIAL (ML) INTRAVENOUS AS NEEDED
Status: DISCONTINUED | OUTPATIENT
Start: 2021-05-21 | End: 2021-05-21 | Stop reason: SURG

## 2021-05-21 RX ORDER — SODIUM CHLORIDE 9 MG/ML
500 INJECTION, SOLUTION INTRAVENOUS CONTINUOUS PRN
Status: DISCONTINUED | OUTPATIENT
Start: 2021-05-21 | End: 2021-05-21 | Stop reason: HOSPADM

## 2021-05-21 RX ORDER — SODIUM CHLORIDE 0.9 % (FLUSH) 0.9 %
10 SYRINGE (ML) INJECTION AS NEEDED
Status: DISCONTINUED | OUTPATIENT
Start: 2021-05-21 | End: 2021-05-21 | Stop reason: HOSPADM

## 2021-05-21 RX ADMIN — PROPOFOL 25 MG: 10 INJECTION, EMULSION INTRAVENOUS at 12:31

## 2021-05-21 RX ADMIN — PROPOFOL 50 MG: 10 INJECTION, EMULSION INTRAVENOUS at 12:29

## 2021-05-21 RX ADMIN — PROPOFOL 25 MG: 10 INJECTION, EMULSION INTRAVENOUS at 12:39

## 2021-05-21 RX ADMIN — PROPOFOL 100 MG: 10 INJECTION, EMULSION INTRAVENOUS at 12:24

## 2021-05-21 RX ADMIN — PROPOFOL 25 MG: 10 INJECTION, EMULSION INTRAVENOUS at 12:33

## 2021-05-21 RX ADMIN — PROPOFOL 25 MG: 10 INJECTION, EMULSION INTRAVENOUS at 12:34

## 2021-05-21 RX ADMIN — PROPOFOL 25 MG: 10 INJECTION, EMULSION INTRAVENOUS at 12:36

## 2021-05-21 RX ADMIN — PROPOFOL 25 MG: 10 INJECTION, EMULSION INTRAVENOUS at 12:41

## 2021-05-21 RX ADMIN — SODIUM CHLORIDE 500 ML: 9 INJECTION, SOLUTION INTRAVENOUS at 11:22

## 2021-05-21 RX ADMIN — PROPOFOL 50 MG: 10 INJECTION, EMULSION INTRAVENOUS at 12:27

## 2021-05-24 LAB
CYTO UR: NORMAL
LAB AP CASE REPORT: NORMAL
PATH REPORT.FINAL DX SPEC: NORMAL
PATH REPORT.GROSS SPEC: NORMAL

## 2022-01-22 NOTE — TELEPHONE ENCOUNTER
Patient had left a message to return her call.  She did not answer my call, so I left my number for her to call me back No

## 2022-02-21 ENCOUNTER — TELEPHONE (OUTPATIENT)
Dept: OBSTETRICS AND GYNECOLOGY | Facility: CLINIC | Age: 42
End: 2022-02-21

## 2022-03-11 RX ORDER — HYDROCORTISONE ACETATE PRAMOXINE HCL 1; 1 G/100G; G/100G
CREAM TOPICAL NIGHTLY
Qty: 28.4 G | Refills: 5 | Status: SHIPPED | OUTPATIENT
Start: 2022-03-11

## 2022-04-06 ENCOUNTER — TELEPHONE (OUTPATIENT)
Dept: OBSTETRICS AND GYNECOLOGY | Facility: CLINIC | Age: 42
End: 2022-04-06

## 2022-07-09 ENCOUNTER — TRANSCRIBE ORDERS (OUTPATIENT)
Dept: ADMINISTRATIVE | Facility: HOSPITAL | Age: 42
End: 2022-07-09

## 2022-07-09 DIAGNOSIS — M54.50 LOW BACK PAIN, UNSPECIFIED BACK PAIN LATERALITY, UNSPECIFIED CHRONICITY, UNSPECIFIED WHETHER SCIATICA PRESENT: Primary | ICD-10-CM

## 2022-07-13 ENCOUNTER — OFFICE VISIT (OUTPATIENT)
Dept: OBSTETRICS AND GYNECOLOGY | Facility: CLINIC | Age: 42
End: 2022-07-13

## 2022-07-13 VITALS
BODY MASS INDEX: 30.66 KG/M2 | DIASTOLIC BLOOD PRESSURE: 68 MMHG | WEIGHT: 184 LBS | HEIGHT: 65 IN | SYSTOLIC BLOOD PRESSURE: 122 MMHG

## 2022-07-13 DIAGNOSIS — Z01.419 WOMEN'S ANNUAL ROUTINE GYNECOLOGICAL EXAMINATION: Primary | ICD-10-CM

## 2022-07-13 DIAGNOSIS — E66.9 OBESITY (BMI 30-39.9): ICD-10-CM

## 2022-07-13 DIAGNOSIS — Z12.4 SCREENING FOR CERVICAL CANCER: ICD-10-CM

## 2022-07-13 DIAGNOSIS — N95.1 PERIMENOPAUSAL VASOMOTOR SYMPTOMS: ICD-10-CM

## 2022-07-13 DIAGNOSIS — L02.91 CUTANEOUS ABSCESS, UNSPECIFIED SITE: ICD-10-CM

## 2022-07-13 DIAGNOSIS — N95.1 PERIMENOPAUSAL: ICD-10-CM

## 2022-07-13 DIAGNOSIS — R68.82 LOW LIBIDO: ICD-10-CM

## 2022-07-13 DIAGNOSIS — Z12.31 ENCOUNTER FOR SCREENING MAMMOGRAM FOR MALIGNANT NEOPLASM OF BREAST: ICD-10-CM

## 2022-07-13 PROBLEM — Z78.9 ELECTRONIC CIGARETTE USE: Status: ACTIVE | Noted: 2022-07-13

## 2022-07-13 PROCEDURE — 87798 DETECT AGENT NOS DNA AMP: CPT | Performed by: NURSE PRACTITIONER

## 2022-07-13 PROCEDURE — 87661 TRICHOMONAS VAGINALIS AMPLIF: CPT | Performed by: NURSE PRACTITIONER

## 2022-07-13 PROCEDURE — 87512 GARDNER VAG DNA QUANT: CPT | Performed by: NURSE PRACTITIONER

## 2022-07-13 PROCEDURE — 99213 OFFICE O/P EST LOW 20 MIN: CPT | Performed by: NURSE PRACTITIONER

## 2022-07-13 PROCEDURE — 99396 PREV VISIT EST AGE 40-64: CPT | Performed by: NURSE PRACTITIONER

## 2022-07-13 PROCEDURE — 87481 CANDIDA DNA AMP PROBE: CPT | Performed by: NURSE PRACTITIONER

## 2022-07-13 PROCEDURE — G0123 SCREEN CERV/VAG THIN LAYER: HCPCS | Performed by: NURSE PRACTITIONER

## 2022-07-13 PROCEDURE — 87563 M. GENITALIUM AMP PROBE: CPT | Performed by: NURSE PRACTITIONER

## 2022-07-13 PROCEDURE — 87624 HPV HI-RISK TYP POOLED RSLT: CPT | Performed by: NURSE PRACTITIONER

## 2022-07-13 RX ORDER — VENLAFAXINE HYDROCHLORIDE 75 MG/1
225 CAPSULE, EXTENDED RELEASE ORAL DAILY
COMMUNITY
Start: 2022-07-07

## 2022-07-13 RX ORDER — DEXTROAMPHETAMINE SACCHARATE, AMPHETAMINE ASPARTATE, DEXTROAMPHETAMINE SULFATE AND AMPHETAMINE SULFATE 5; 5; 5; 5 MG/1; MG/1; MG/1; MG/1
1 TABLET ORAL 2 TIMES DAILY
COMMUNITY
Start: 2022-06-14

## 2022-07-13 RX ORDER — BUSPIRONE HYDROCHLORIDE 15 MG/1
15 TABLET ORAL 3 TIMES DAILY
COMMUNITY
Start: 2022-05-17

## 2022-07-14 ENCOUNTER — APPOINTMENT (OUTPATIENT)
Dept: MRI IMAGING | Facility: HOSPITAL | Age: 42
End: 2022-07-14

## 2022-07-15 LAB — HPV I/H RISK 4 DNA CVX QL PROBE+SIG AMP: NOT DETECTED

## 2022-07-18 LAB
GEN CATEG CVX/VAG CYTO-IMP: NORMAL
LAB AP CASE REPORT: NORMAL
LAB AP GYN ADDITIONAL INFORMATION: NORMAL
LAB AP GYN OTHER FINDINGS: NORMAL
Lab: NORMAL
PATH INTERP SPEC-IMP: NORMAL
STAT OF ADQ CVX/VAG CYTO-IMP: NORMAL

## 2022-07-20 ENCOUNTER — TELEPHONE (OUTPATIENT)
Dept: OBSTETRICS AND GYNECOLOGY | Facility: CLINIC | Age: 42
End: 2022-07-20

## 2022-07-20 ENCOUNTER — HOSPITAL ENCOUNTER (OUTPATIENT)
Dept: MAMMOGRAPHY | Facility: HOSPITAL | Age: 42
Discharge: HOME | End: 2022-07-20
Admitting: NURSE PRACTITIONER

## 2022-07-20 DIAGNOSIS — Z12.31 ENCOUNTER FOR SCREENING MAMMOGRAM FOR MALIGNANT NEOPLASM OF BREAST: ICD-10-CM

## 2022-07-20 DIAGNOSIS — Z01.419 WOMEN'S ANNUAL ROUTINE GYNECOLOGICAL EXAMINATION: ICD-10-CM

## 2022-07-20 PROCEDURE — 77063 BREAST TOMOSYNTHESIS BI: CPT

## 2022-07-20 PROCEDURE — 77067 SCR MAMMO BI INCL CAD: CPT

## 2022-07-21 LAB — TRICHOMONAS VAGINALIS PCR: NOT DETECTED

## 2022-07-26 ENCOUNTER — HOSPITAL ENCOUNTER (OUTPATIENT)
Dept: MRI IMAGING | Facility: HOSPITAL | Age: 42
Discharge: HOME | End: 2022-07-26
Admitting: NURSE PRACTITIONER

## 2022-07-26 DIAGNOSIS — M54.50 LOW BACK PAIN, UNSPECIFIED BACK PAIN LATERALITY, UNSPECIFIED CHRONICITY, UNSPECIFIED WHETHER SCIATICA PRESENT: ICD-10-CM

## 2022-07-26 PROCEDURE — 72148 MRI LUMBAR SPINE W/O DYE: CPT

## 2022-07-28 DIAGNOSIS — R68.82 LOW LIBIDO: Primary | ICD-10-CM

## 2022-09-28 RX ORDER — PROGESTERONE 100 MG/1
100 CAPSULE ORAL DAILY
Qty: 30 CAPSULE | Refills: 1 | Status: SHIPPED | OUTPATIENT
Start: 2022-09-28 | End: 2022-11-07 | Stop reason: SDUPTHER

## 2022-10-13 ENCOUNTER — OFFICE VISIT (OUTPATIENT)
Dept: OBSTETRICS AND GYNECOLOGY | Facility: CLINIC | Age: 42
End: 2022-10-13

## 2022-10-13 VITALS
HEIGHT: 65 IN | WEIGHT: 192 LBS | SYSTOLIC BLOOD PRESSURE: 132 MMHG | DIASTOLIC BLOOD PRESSURE: 88 MMHG | BODY MASS INDEX: 31.99 KG/M2

## 2022-10-13 DIAGNOSIS — E66.9 OBESITY (BMI 30-39.9): ICD-10-CM

## 2022-10-13 DIAGNOSIS — R79.89 LOW TESTOSTERONE LEVEL IN FEMALE: Primary | ICD-10-CM

## 2022-10-13 PROCEDURE — 99213 OFFICE O/P EST LOW 20 MIN: CPT | Performed by: NURSE PRACTITIONER

## 2022-10-13 RX ORDER — ONDANSETRON 4 MG/1
TABLET, ORALLY DISINTEGRATING ORAL
COMMUNITY

## 2022-10-14 LAB
TESTOST SERPL-MCNC: 40 NG/DL (ref 4–50)
TSH SERPL DL<=0.005 MIU/L-ACNC: 0.97 UIU/ML (ref 0.27–4.2)

## 2022-11-07 RX ORDER — PROGESTERONE 100 MG/1
100 CAPSULE ORAL DAILY
Qty: 30 CAPSULE | Refills: 1 | Status: SHIPPED | OUTPATIENT
Start: 2022-11-07

## 2022-12-08 ENCOUNTER — TRANSCRIBE ORDERS (OUTPATIENT)
Dept: ADMINISTRATIVE | Facility: HOSPITAL | Age: 42
End: 2022-12-08

## 2022-12-08 DIAGNOSIS — M25.50 ARTHRALGIA, UNSPECIFIED JOINT: Primary | ICD-10-CM

## 2022-12-12 ENCOUNTER — OFFICE VISIT (OUTPATIENT)
Age: 42
End: 2022-12-12
Payer: COMMERCIAL

## 2022-12-12 VITALS
BODY MASS INDEX: 31.78 KG/M2 | SYSTOLIC BLOOD PRESSURE: 120 MMHG | HEART RATE: 123 BPM | TEMPERATURE: 97.4 F | DIASTOLIC BLOOD PRESSURE: 80 MMHG | OXYGEN SATURATION: 98 % | RESPIRATION RATE: 18 BRPM | WEIGHT: 191 LBS

## 2022-12-12 DIAGNOSIS — R05.1 ACUTE COUGH: ICD-10-CM

## 2022-12-12 DIAGNOSIS — Z11.52 ENCOUNTER FOR SCREENING FOR COVID-19: ICD-10-CM

## 2022-12-12 DIAGNOSIS — J06.9 VIRAL UPPER RESPIRATORY INFECTION: Primary | ICD-10-CM

## 2022-12-12 LAB
INFLUENZA A ANTIBODY: NEGATIVE
INFLUENZA B ANTIBODY: NEGATIVE
SARS-COV-2, PCR: DETECTED

## 2022-12-12 PROCEDURE — 87804 INFLUENZA ASSAY W/OPTIC: CPT

## 2022-12-12 PROCEDURE — 99213 OFFICE O/P EST LOW 20 MIN: CPT

## 2022-12-12 RX ORDER — METHYLPREDNISOLONE 4 MG/1
TABLET ORAL
Qty: 1 KIT | Refills: 0 | Status: SHIPPED | OUTPATIENT
Start: 2022-12-12 | End: 2022-12-18

## 2022-12-12 RX ORDER — BUSPIRONE HYDROCHLORIDE 15 MG/1
15 TABLET ORAL 3 TIMES DAILY
COMMUNITY
Start: 2022-05-17

## 2022-12-12 RX ORDER — VENLAFAXINE HYDROCHLORIDE 75 MG/1
225 CAPSULE, EXTENDED RELEASE ORAL DAILY
COMMUNITY
Start: 2022-07-07

## 2022-12-12 RX ORDER — DEXTROAMPHETAMINE SACCHARATE, AMPHETAMINE ASPARTATE, DEXTROAMPHETAMINE SULFATE AND AMPHETAMINE SULFATE 5; 5; 5; 5 MG/1; MG/1; MG/1; MG/1
20 TABLET ORAL 2 TIMES DAILY
COMMUNITY
Start: 2022-06-14

## 2022-12-12 RX ORDER — PROGESTERONE 100 MG/1
100 CAPSULE ORAL DAILY
COMMUNITY
Start: 2022-11-07

## 2022-12-12 ASSESSMENT — ENCOUNTER SYMPTOMS
ABDOMINAL PAIN: 0
SINUS PRESSURE: 0
SORE THROAT: 1
COUGH: 1
WHEEZING: 0
ALLERGIC/IMMUNOLOGIC NEGATIVE: 1
SINUS PAIN: 0
NAUSEA: 0
DIARRHEA: 0
EYE PAIN: 0
VOMITING: 0
SHORTNESS OF BREATH: 0

## 2022-12-12 NOTE — PROGRESS NOTES
[address]  [address] [address] [place] [address]  Dept: [phone]  Dept Fax: [phone]  Loc: [phone]    Toñito Aquino is a 40 y.o. female who presents today for her medical conditions/complaints as noted below. Toñito Aquino is c/o of Cough, Chills, Sweats, and Fever        HPI:     HPI  Toñito Aquino presents with complaints of sore throat, cough, congestion, chills, and fever. Patient states she has been in bed since Saturday. Reports no appetite or energy. Only has drank 1/2 bottle of water. Denies any N/V. No known exposure to COVID positive case. States she did have a sinus infection a few weeks ago, PCP prescribed antibiotic, unsure of the name of it. Recent antibiotics for sinus infection. Denies any recent steroids. Denies recent covid19 infection. Past Medical History:   Diagnosis Date    ADHD (attention deficit hyperactivity disorder)      Past Surgical History:   Procedure Laterality Date    CHOLECYSTECTOMY      TONSILLECTOMY         Family History   Problem Relation Age of Onset    Depression Mother     Anxiety Disorder Mother     Depression Father     Anxiety Disorder Father     Anxiety Disorder Maternal Grandmother     Depression Maternal Grandmother     Atrial Fibrillation Maternal Grandmother     Cancer Maternal Grandmother     Cancer Maternal Grandfather     Cancer Paternal Grandmother     Heart Disease Paternal Grandfather        Social History     Tobacco Use    Smoking status: Never    Smokeless tobacco: Never   Substance Use Topics    Alcohol use: Yes     Comment: SOCIAL      Current Outpatient Medications   Medication Sig Dispense Refill    amphetamine-dextroamphetamine (ADDERALL) 20 MG tablet Take 20 mg by mouth 2 times daily.       busPIRone (BUSPAR) 15 MG tablet Take 15 mg by mouth 3 times daily      progesterone (PROMETRIUM) 100 MG CAPS capsule Take 100 mg by mouth daily      venlafaxine (EFFEXOR XR) 75 MG extended release capsule Take 225 mg by mouth daily      methylPREDNISolone (MEDROL DOSEPACK) 4 MG tablet Take by mouth. 1 kit 0    escitalopram (LEXAPRO) 10 MG tablet Take 1 tablet by mouth daily 30 tablet 5    cyanocobalamin 1000 MCG/ML injection Inject 1 mL into the muscle every 30 days for 1 dose 1 mL 3    Needles & Syringes MISC 1 each by Does not apply route every 30 days for 1 day 1 each 3     No current facility-administered medications for this visit. Allergies   Allergen Reactions    Azithromycin Shortness Of Breath       Health Maintenance   Topic Date Due    COVID-19 Vaccine (1) Never done    Varicella vaccine (1 of 2 - 2-dose childhood series) Never done    Depression Screen  Never done    HIV screen  Never done    Hepatitis C screen  Never done    DTaP/Tdap/Td vaccine (1 - Tdap) Never done    Cervical cancer screen  Never done    Lipids  Never done    Flu vaccine (1) Never done    Hepatitis A vaccine  Aged Out    Hib vaccine  Aged Out    Meningococcal (ACWY) vaccine  Aged Out    Pneumococcal 0-64 years Vaccine  Aged Out       Subjective:     Review of Systems   Constitutional:  Positive for appetite change, chills, fatigue and fever. HENT:  Positive for congestion and sore throat. Negative for postnasal drip, sinus pressure and sinus pain. Eyes:  Negative for pain and visual disturbance. Respiratory:  Positive for cough. Negative for shortness of breath and wheezing. Cardiovascular:  Negative for chest pain. Gastrointestinal:  Negative for abdominal pain, diarrhea, nausea and vomiting. Endocrine: Negative for cold intolerance and heat intolerance. Genitourinary:  Negative for frequency, hematuria and urgency. Musculoskeletal:  Positive for myalgias. Skin:  Negative for rash. Allergic/Immunologic: Negative. Neurological:  Negative for weakness, light-headedness and headaches. Hematological: Negative.     Psychiatric/Behavioral: Negative.       :Objective Physical Exam  Constitutional:       Appearance: She is ill-appearing. HENT:      Head: Normocephalic and atraumatic. Right Ear: Ear canal and external ear normal.      Left Ear: Ear canal and external ear normal.      Ears:      Comments: Bilateral dull TM's     Nose: Congestion and rhinorrhea present. Right Turbinates: Swollen and pale. Left Turbinates: Swollen and pale. Right Sinus: No maxillary sinus tenderness or frontal sinus tenderness. Left Sinus: No maxillary sinus tenderness or frontal sinus tenderness. Mouth/Throat:      Mouth: Mucous membranes are moist.      Pharynx: Posterior oropharyngeal erythema (postnasal drainage) present. No pharyngeal swelling or oropharyngeal exudate. Eyes:      General:         Right eye: No discharge. Left eye: No discharge. Conjunctiva/sclera: Conjunctivae normal.   Cardiovascular:      Rate and Rhythm: Normal rate and regular rhythm. Pulmonary:      Effort: Pulmonary effort is normal. No respiratory distress. Breath sounds: Normal breath sounds. Abdominal:      General: Abdomen is flat. Palpations: Abdomen is soft. Musculoskeletal:         General: Normal range of motion. Cervical back: Normal range of motion. Skin:     General: Skin is warm and dry. Capillary Refill: Capillary refill takes less than 2 seconds. Neurological:      General: No focal deficit present. Mental Status: She is alert. Psychiatric:         Mood and Affect: Mood normal.     /80   Pulse (!) 123   Temp 97.4 °F (36.3 °C) (Temporal)   Resp 18   Wt 191 lb (86.6 kg)   SpO2 98%   BMI 31.78 kg/m²     :Assessment       Diagnosis Orders   1. Viral upper respiratory infection  methylPREDNISolone (MEDROL DOSEPACK) 4 MG tablet      2. Encounter for screening for COVID-19  COVID-19      3. Acute cough  POCT Influenza A/B          :Plan   Flu negative.   COVID swab pending- will call with results (if negative, start Medrol dose pack)  Quarantine until results are available. OTC oral and nasal decongestants. Tylenol/Motrin as needed for fever. Encourage rest, increase fluids- specifically water. Cool mist humidifier. Return precautions and home care education completed. Patient verbalized understanding. Orders Placed This Encounter   Procedures    COVID-19     Scheduling Instructions:      1) Due to current limited availability of the COVID-19 test, tests will be prioritized based on responses to questions above. Testing may be delayed due to volume. 2) Print and instruct patient to adhere to CDC home isolation program. (Link Above)              3) Set up or refer patient for a monitoring program.              4) Have patient sign up for and leverage MyChart (if not previously done). Order Specific Question:   Is this test for diagnosis or screening? Answer:   Screening     Order Specific Question:   Symptomatic for COVID-19 as defined by CDC? Answer:   No     Order Specific Question:   Date of Symptom Onset     Answer:   N/A     Order Specific Question:   Hospitalized for COVID-19? Answer:   No     Order Specific Question:   Admitted to ICU for COVID-19? Answer:   No     Order Specific Question:   Employed in healthcare setting? Answer:   Unknown     Order Specific Question:   Resident in a congregate (group) care setting? Answer:   Unknown     Order Specific Question:   Pregnant? Answer:   Unknown     Order Specific Question:   Previously tested for COVID-19? Answer:   Unknown    COVID-19    COVID-19    POCT Influenza A/B       Results for orders placed or performed in visit on 12/12/22   POCT Influenza A/B   Result Value Ref Range    Influenza A Ab negative     Influenza B Ab negative        Return if symptoms worsen or fail to improve. Orders Placed This Encounter   Medications    methylPREDNISolone (MEDROL DOSEPACK) 4 MG tablet     Sig: Take by mouth.      Dispense: 1 kit     Refill:  0         Patient given educational materials- see patient instructions. Discussed use, benefit, and side effects of prescribed medications. All patient questions answered. Pt voiced understanding. Patient Instructions   Flu negative. COVID swab pending- will call with results (if negative, start Medrol dose pack)  Quarantine until results are available. OTC oral and nasal decongestants. Tylenol/Motrin as needed for fever. Encourage rest, increase fluids- specifically water. Cool mist humidifier. Follow up with PCP or return to clinic if symptoms worsen or fail to improve.       Electronically signed by JASWANT Pryor CNP on 12/12/2022 at 1:17 PM

## 2022-12-12 NOTE — LETTER
[facility]  [address] [id number]  Phone: [phone]  Fax: JASWANT Del Rio CNP        December 12, 2022     Patient: Olivier Manjarrez   YOB: 1980   Date of Visit: 12/12/2022       To Whom it May Concern:    Venessa Nascimento was seen in my clinic on 12/12/2022. She may return to work on 12/14/2022. If you have any questions or concerns, please don't hesitate to call.     Sincerely,         JASWANT Arboleda CNP

## 2022-12-12 NOTE — PATIENT INSTRUCTIONS
Flu negative. COVID swab pending- will call with results (if negative, start Medrol dose pack)  Quarantine until results are available. OTC oral and nasal decongestants. Tylenol/Motrin as needed for fever. Encourage rest, increase fluids- specifically water. Cool mist humidifier. Follow up with PCP or return to clinic if symptoms worsen or fail to improve.

## 2022-12-27 ENCOUNTER — HOSPITAL ENCOUNTER (OUTPATIENT)
Dept: BONE DENSITY | Facility: HOSPITAL | Age: 42
Discharge: HOME | End: 2022-12-27
Admitting: NURSE PRACTITIONER

## 2022-12-27 DIAGNOSIS — M25.50 ARTHRALGIA, UNSPECIFIED JOINT: ICD-10-CM

## 2022-12-27 PROCEDURE — 77080 DXA BONE DENSITY AXIAL: CPT

## 2023-01-13 ENCOUNTER — OFFICE VISIT (OUTPATIENT)
Dept: OBSTETRICS AND GYNECOLOGY | Facility: CLINIC | Age: 43
End: 2023-01-13
Payer: COMMERCIAL

## 2023-01-13 VITALS
SYSTOLIC BLOOD PRESSURE: 112 MMHG | HEIGHT: 65 IN | BODY MASS INDEX: 31.99 KG/M2 | DIASTOLIC BLOOD PRESSURE: 70 MMHG | WEIGHT: 192 LBS

## 2023-01-13 DIAGNOSIS — R79.89 LOW TESTOSTERONE LEVEL IN FEMALE: ICD-10-CM

## 2023-01-13 DIAGNOSIS — Z79.890 HORMONE REPLACEMENT THERAPY (HRT): Primary | ICD-10-CM

## 2023-01-13 DIAGNOSIS — R68.82 LOW LIBIDO: ICD-10-CM

## 2023-01-13 DIAGNOSIS — E66.9 OBESITY (BMI 30-39.9): ICD-10-CM

## 2023-01-13 PROCEDURE — 99213 OFFICE O/P EST LOW 20 MIN: CPT | Performed by: NURSE PRACTITIONER

## 2023-01-24 ENCOUNTER — TELEPHONE (OUTPATIENT)
Dept: OBSTETRICS AND GYNECOLOGY | Facility: CLINIC | Age: 43
End: 2023-01-24
Payer: COMMERCIAL

## 2023-09-14 ENCOUNTER — LAB (OUTPATIENT)
Dept: LAB | Facility: HOSPITAL | Age: 43
End: 2023-09-14
Payer: COMMERCIAL

## 2023-09-14 ENCOUNTER — OFFICE VISIT (OUTPATIENT)
Dept: INTERNAL MEDICINE | Facility: CLINIC | Age: 43
End: 2023-09-14
Payer: COMMERCIAL

## 2023-09-14 ENCOUNTER — OFFICE VISIT (OUTPATIENT)
Dept: OBSTETRICS AND GYNECOLOGY | Facility: CLINIC | Age: 43
End: 2023-09-14
Payer: COMMERCIAL

## 2023-09-14 VITALS
HEART RATE: 92 BPM | TEMPERATURE: 97.6 F | HEIGHT: 65 IN | BODY MASS INDEX: 29.66 KG/M2 | WEIGHT: 178 LBS | OXYGEN SATURATION: 98 % | SYSTOLIC BLOOD PRESSURE: 120 MMHG | DIASTOLIC BLOOD PRESSURE: 80 MMHG

## 2023-09-14 VITALS
SYSTOLIC BLOOD PRESSURE: 124 MMHG | WEIGHT: 178 LBS | HEIGHT: 65 IN | BODY MASS INDEX: 29.66 KG/M2 | DIASTOLIC BLOOD PRESSURE: 76 MMHG

## 2023-09-14 DIAGNOSIS — G89.29 CHRONIC NONINTRACTABLE HEADACHE, UNSPECIFIED HEADACHE TYPE: ICD-10-CM

## 2023-09-14 DIAGNOSIS — N39.3 STRESS INCONTINENCE: ICD-10-CM

## 2023-09-14 DIAGNOSIS — M25.50 ARTHRALGIA, UNSPECIFIED JOINT: ICD-10-CM

## 2023-09-14 DIAGNOSIS — R10.2 PELVIC PAIN: ICD-10-CM

## 2023-09-14 DIAGNOSIS — R53.82 CHRONIC FATIGUE: ICD-10-CM

## 2023-09-14 DIAGNOSIS — Z00.00 HEALTHCARE MAINTENANCE: ICD-10-CM

## 2023-09-14 DIAGNOSIS — Z01.419 WELL WOMAN EXAM WITH ROUTINE GYNECOLOGICAL EXAM: Primary | ICD-10-CM

## 2023-09-14 DIAGNOSIS — R53.82 CHRONIC FATIGUE: Primary | ICD-10-CM

## 2023-09-14 DIAGNOSIS — M79.10 MYALGIA: ICD-10-CM

## 2023-09-14 DIAGNOSIS — Z12.31 ENCOUNTER FOR SCREENING MAMMOGRAM FOR MALIGNANT NEOPLASM OF BREAST: ICD-10-CM

## 2023-09-14 DIAGNOSIS — F41.9 ANXIETY: ICD-10-CM

## 2023-09-14 DIAGNOSIS — M79.18 MYOFASCIAL PAIN SYNDROME: ICD-10-CM

## 2023-09-14 DIAGNOSIS — Z12.4 CERVICAL CANCER SCREENING: ICD-10-CM

## 2023-09-14 DIAGNOSIS — R51.9 CHRONIC NONINTRACTABLE HEADACHE, UNSPECIFIED HEADACHE TYPE: ICD-10-CM

## 2023-09-14 DIAGNOSIS — R68.82 LOW LIBIDO: ICD-10-CM

## 2023-09-14 LAB
ALBUMIN SERPL-MCNC: 4.9 G/DL (ref 3.5–5.2)
ALBUMIN/GLOB SERPL: 1.8 G/DL
ALP SERPL-CCNC: 67 U/L (ref 39–117)
ALT SERPL W P-5'-P-CCNC: 11 U/L (ref 1–33)
ANION GAP SERPL CALCULATED.3IONS-SCNC: 11 MMOL/L (ref 5–15)
AST SERPL-CCNC: 16 U/L (ref 1–32)
BILIRUB SERPL-MCNC: 0.3 MG/DL (ref 0–1.2)
BUN SERPL-MCNC: 22 MG/DL (ref 6–20)
BUN/CREAT SERPL: 25.3 (ref 7–25)
CALCIUM SPEC-SCNC: 10 MG/DL (ref 8.6–10.5)
CHLORIDE SERPL-SCNC: 101 MMOL/L (ref 98–107)
CHOLEST SERPL-MCNC: 249 MG/DL (ref 0–200)
CHROMATIN AB SERPL-ACNC: 10.1 IU/ML (ref 0–14)
CK SERPL-CCNC: 127 U/L (ref 20–180)
CO2 SERPL-SCNC: 27 MMOL/L (ref 22–29)
CREAT SERPL-MCNC: 0.87 MG/DL (ref 0.57–1)
CRP SERPL-MCNC: 0.71 MG/DL (ref 0–0.5)
DEPRECATED RDW RBC AUTO: 40 FL (ref 37–54)
EGFRCR SERPLBLD CKD-EPI 2021: 85.4 ML/MIN/1.73
ERYTHROCYTE [DISTWIDTH] IN BLOOD BY AUTOMATED COUNT: 11.9 % (ref 12.3–15.4)
ERYTHROCYTE [SEDIMENTATION RATE] IN BLOOD: 17 MM/HR (ref 0–20)
GLOBULIN UR ELPH-MCNC: 2.8 GM/DL
GLUCOSE SERPL-MCNC: 100 MG/DL (ref 65–99)
HBA1C MFR BLD: 5.6 % (ref 4.8–5.6)
HCT VFR BLD AUTO: 40.1 % (ref 34–46.6)
HDLC SERPL-MCNC: 91 MG/DL (ref 40–60)
HGB BLD-MCNC: 13.1 G/DL (ref 12–15.9)
LDLC SERPL CALC-MCNC: 144 MG/DL (ref 0–100)
LDLC/HDLC SERPL: 1.55 {RATIO}
MCH RBC QN AUTO: 29.9 PG (ref 26.6–33)
MCHC RBC AUTO-ENTMCNC: 32.7 G/DL (ref 31.5–35.7)
MCV RBC AUTO: 91.6 FL (ref 79–97)
PLATELET # BLD AUTO: 327 10*3/MM3 (ref 140–450)
PMV BLD AUTO: 9.2 FL (ref 6–12)
POTASSIUM SERPL-SCNC: 3.7 MMOL/L (ref 3.5–5.2)
PROT SERPL-MCNC: 7.7 G/DL (ref 6–8.5)
RBC # BLD AUTO: 4.38 10*6/MM3 (ref 3.77–5.28)
SODIUM SERPL-SCNC: 139 MMOL/L (ref 136–145)
TESTOST SERPL-MCNC: 5.35 NG/DL (ref 8.4–48.1)
TRIGL SERPL-MCNC: 83 MG/DL (ref 0–150)
TSH SERPL DL<=0.05 MIU/L-ACNC: 0.6 UIU/ML (ref 0.27–4.2)
VIT B12 BLD-MCNC: 481 PG/ML (ref 211–946)
VLDLC SERPL-MCNC: 14 MG/DL (ref 5–40)
WBC NRBC COR # BLD: 7.86 10*3/MM3 (ref 3.4–10.8)

## 2023-09-14 PROCEDURE — 84403 ASSAY OF TOTAL TESTOSTERONE: CPT | Performed by: NURSE PRACTITIONER

## 2023-09-14 PROCEDURE — 86235 NUCLEAR ANTIGEN ANTIBODY: CPT

## 2023-09-14 PROCEDURE — 80061 LIPID PANEL: CPT

## 2023-09-14 PROCEDURE — 82550 ASSAY OF CK (CPK): CPT

## 2023-09-14 PROCEDURE — 86225 DNA ANTIBODY NATIVE: CPT

## 2023-09-14 PROCEDURE — 36415 COLL VENOUS BLD VENIPUNCTURE: CPT

## 2023-09-14 PROCEDURE — 86140 C-REACTIVE PROTEIN: CPT

## 2023-09-14 PROCEDURE — 85652 RBC SED RATE AUTOMATED: CPT

## 2023-09-14 PROCEDURE — 80050 GENERAL HEALTH PANEL: CPT

## 2023-09-14 PROCEDURE — 86431 RHEUMATOID FACTOR QUANT: CPT

## 2023-09-14 PROCEDURE — 82607 VITAMIN B-12: CPT

## 2023-09-14 PROCEDURE — G0123 SCREEN CERV/VAG THIN LAYER: HCPCS | Performed by: NURSE PRACTITIONER

## 2023-09-14 PROCEDURE — 86038 ANTINUCLEAR ANTIBODIES: CPT

## 2023-09-14 PROCEDURE — 82085 ASSAY OF ALDOLASE: CPT

## 2023-09-14 PROCEDURE — 83036 HEMOGLOBIN GLYCOSYLATED A1C: CPT

## 2023-09-14 PROCEDURE — 86200 CCP ANTIBODY: CPT

## 2023-09-14 PROCEDURE — 87624 HPV HI-RISK TYP POOLED RSLT: CPT | Performed by: NURSE PRACTITIONER

## 2023-09-14 RX ORDER — PREGABALIN 75 MG/1
75 CAPSULE ORAL 2 TIMES DAILY
Qty: 60 CAPSULE | Refills: 1 | Status: SHIPPED | OUTPATIENT
Start: 2023-09-14

## 2023-09-15 LAB
ALDOLASE SERPL-CCNC: 4.7 U/L (ref 3.3–10.3)
CCP IGA+IGG SERPL IA-ACNC: 2 UNITS (ref 0–19)
GEN CATEG CVX/VAG CYTO-IMP: NORMAL
HPV I/H RISK 4 DNA CVX QL PROBE+SIG AMP: NOT DETECTED
LAB AP CASE REPORT: NORMAL
LAB AP GYN ADDITIONAL INFORMATION: NORMAL
Lab: NORMAL
PATH INTERP SPEC-IMP: NORMAL
STAT OF ADQ CVX/VAG CYTO-IMP: NORMAL

## 2023-09-18 DIAGNOSIS — R76.8 POSITIVE ANA (ANTINUCLEAR ANTIBODY): Primary | ICD-10-CM

## 2023-09-18 LAB
ANA SER QL IF: POSITIVE
ANA SPECKLED TITR SER: ABNORMAL {TITER}
CENTROMERE B AB SER-ACNC: <0.2 AI (ref 0–0.9)
CHROMATIN AB SERPL-ACNC: <0.2 AI (ref 0–0.9)
DSDNA AB SER-ACNC: 2 IU/ML (ref 0–9)
ENA JO1 AB SER-ACNC: <0.2 AI (ref 0–0.9)
ENA RNP AB SER-ACNC: <0.2 AI (ref 0–0.9)
ENA SCL70 AB SER-ACNC: <0.2 AI (ref 0–0.9)
ENA SM AB SER-ACNC: <0.2 AI (ref 0–0.9)
ENA SS-A AB SER-ACNC: 0.2 AI (ref 0–0.9)
ENA SS-B AB SER-ACNC: <0.2 AI (ref 0–0.9)
LABORATORY COMMENT REPORT: ABNORMAL
Lab: ABNORMAL
Lab: ABNORMAL

## 2023-09-28 ENCOUNTER — HOSPITAL ENCOUNTER (OUTPATIENT)
Dept: GENERAL RADIOLOGY | Facility: HOSPITAL | Age: 43
Discharge: HOME | End: 2023-09-28
Payer: COMMERCIAL

## 2023-09-28 ENCOUNTER — OFFICE VISIT (OUTPATIENT)
Dept: INTERNAL MEDICINE | Facility: CLINIC | Age: 43
End: 2023-09-28
Payer: COMMERCIAL

## 2023-09-28 ENCOUNTER — HOSPITAL ENCOUNTER (OUTPATIENT)
Dept: MAMMOGRAPHY | Facility: HOSPITAL | Age: 43
Discharge: HOME | End: 2023-09-28
Payer: COMMERCIAL

## 2023-09-28 VITALS
HEART RATE: 93 BPM | WEIGHT: 188 LBS | BODY MASS INDEX: 31.28 KG/M2 | OXYGEN SATURATION: 99 % | SYSTOLIC BLOOD PRESSURE: 120 MMHG | DIASTOLIC BLOOD PRESSURE: 70 MMHG | TEMPERATURE: 97.9 F

## 2023-09-28 DIAGNOSIS — M25.50 ARTHRALGIA, UNSPECIFIED JOINT: ICD-10-CM

## 2023-09-28 DIAGNOSIS — G89.29 CHRONIC MIDLINE LOW BACK PAIN, UNSPECIFIED WHETHER SCIATICA PRESENT: ICD-10-CM

## 2023-09-28 DIAGNOSIS — M54.50 CHRONIC MIDLINE LOW BACK PAIN, UNSPECIFIED WHETHER SCIATICA PRESENT: ICD-10-CM

## 2023-09-28 DIAGNOSIS — M79.18 MYOFASCIAL PAIN SYNDROME: ICD-10-CM

## 2023-09-28 DIAGNOSIS — R76.8 POSITIVE ANA (ANTINUCLEAR ANTIBODY): ICD-10-CM

## 2023-09-28 DIAGNOSIS — R53.82 CHRONIC FATIGUE: ICD-10-CM

## 2023-09-28 DIAGNOSIS — M25.50 ARTHRALGIA, UNSPECIFIED JOINT: Primary | ICD-10-CM

## 2023-09-28 DIAGNOSIS — R20.2 PARESTHESIA: ICD-10-CM

## 2023-09-28 PROCEDURE — 77063 BREAST TOMOSYNTHESIS BI: CPT

## 2023-09-28 PROCEDURE — 77067 SCR MAMMO BI INCL CAD: CPT

## 2023-09-28 PROCEDURE — 73560 X-RAY EXAM OF KNEE 1 OR 2: CPT

## 2023-09-28 PROCEDURE — 72100 X-RAY EXAM L-S SPINE 2/3 VWS: CPT

## 2023-09-28 RX ORDER — EPINEPHRINE 0.3 MG/.3ML
INJECTION SUBCUTANEOUS
COMMUNITY

## 2023-09-28 RX ORDER — PREGABALIN 75 MG/1
150 CAPSULE ORAL 2 TIMES DAILY
Qty: 120 CAPSULE | Refills: 1 | Status: SHIPPED | OUTPATIENT
Start: 2023-09-28

## 2023-09-29 ENCOUNTER — TELEPHONE (OUTPATIENT)
Dept: OBSTETRICS AND GYNECOLOGY | Facility: CLINIC | Age: 43
End: 2023-09-29
Payer: COMMERCIAL

## 2023-10-05 ENCOUNTER — TELEPHONE (OUTPATIENT)
Dept: INTERNAL MEDICINE | Facility: CLINIC | Age: 43
End: 2023-10-05

## 2023-10-05 DIAGNOSIS — R20.2 PARESTHESIA: Primary | ICD-10-CM

## 2023-10-06 DIAGNOSIS — H53.9 VISUAL DISTURBANCES: Primary | ICD-10-CM

## 2023-10-10 ENCOUNTER — TELEMEDICINE (OUTPATIENT)
Dept: OBSTETRICS AND GYNECOLOGY | Facility: CLINIC | Age: 43
End: 2023-10-10
Payer: COMMERCIAL

## 2023-10-10 DIAGNOSIS — N39.3 STRESS INCONTINENCE: Primary | ICD-10-CM

## 2023-10-13 ENCOUNTER — HOSPITAL ENCOUNTER (OUTPATIENT)
Dept: MRI IMAGING | Facility: HOSPITAL | Age: 43
Discharge: HOME | End: 2023-10-13
Admitting: INTERNAL MEDICINE
Payer: COMMERCIAL

## 2023-10-13 DIAGNOSIS — M25.50 ARTHRALGIA, UNSPECIFIED JOINT: ICD-10-CM

## 2023-10-13 DIAGNOSIS — R53.82 CHRONIC FATIGUE: ICD-10-CM

## 2023-10-13 DIAGNOSIS — R51.9 CHRONIC NONINTRACTABLE HEADACHE, UNSPECIFIED HEADACHE TYPE: ICD-10-CM

## 2023-10-13 DIAGNOSIS — M79.10 MYALGIA: ICD-10-CM

## 2023-10-13 DIAGNOSIS — G89.29 CHRONIC NONINTRACTABLE HEADACHE, UNSPECIFIED HEADACHE TYPE: ICD-10-CM

## 2023-10-13 LAB — CREAT BLDA-MCNC: 0.8 MG/DL (ref 0.6–1.3)

## 2023-10-13 PROCEDURE — A9577 INJ MULTIHANCE: HCPCS | Performed by: INTERNAL MEDICINE

## 2023-10-13 PROCEDURE — 70553 MRI BRAIN STEM W/O & W/DYE: CPT

## 2023-10-13 PROCEDURE — 82565 ASSAY OF CREATININE: CPT

## 2023-10-13 PROCEDURE — 0 GADOBENATE DIMEGLUMINE 529 MG/ML SOLUTION: Performed by: INTERNAL MEDICINE

## 2023-10-13 RX ADMIN — GADOBENATE DIMEGLUMINE 18 ML: 529 INJECTION, SOLUTION INTRAVENOUS at 08:59

## 2023-10-19 ENCOUNTER — TELEPHONE (OUTPATIENT)
Dept: OBSTETRICS AND GYNECOLOGY | Facility: CLINIC | Age: 43
End: 2023-10-19

## 2023-10-20 ENCOUNTER — OFFICE VISIT (OUTPATIENT)
Dept: INTERNAL MEDICINE | Facility: CLINIC | Age: 43
End: 2023-10-20
Payer: COMMERCIAL

## 2023-10-20 VITALS
SYSTOLIC BLOOD PRESSURE: 120 MMHG | HEART RATE: 92 BPM | TEMPERATURE: 97.8 F | HEIGHT: 65 IN | OXYGEN SATURATION: 99 % | DIASTOLIC BLOOD PRESSURE: 80 MMHG | BODY MASS INDEX: 32.99 KG/M2 | WEIGHT: 198 LBS

## 2023-10-20 DIAGNOSIS — H53.9 VISUAL DISTURBANCES: Primary | ICD-10-CM

## 2023-10-20 DIAGNOSIS — R76.8 POSITIVE ANA (ANTINUCLEAR ANTIBODY): ICD-10-CM

## 2023-10-20 DIAGNOSIS — R20.2 PARESTHESIA: ICD-10-CM

## 2023-10-20 DIAGNOSIS — M25.50 ARTHRALGIA, UNSPECIFIED JOINT: ICD-10-CM

## 2023-10-20 DIAGNOSIS — R53.82 CHRONIC FATIGUE: ICD-10-CM

## 2023-10-20 RX ORDER — ESCITALOPRAM OXALATE 5 MG/1
5 TABLET ORAL DAILY
COMMUNITY

## 2023-10-24 ENCOUNTER — TELEPHONE (OUTPATIENT)
Dept: NEUROLOGY | Facility: HOSPITAL | Age: 43
End: 2023-10-24

## 2023-10-25 ENCOUNTER — HOSPITAL ENCOUNTER (OUTPATIENT)
Dept: NEUROLOGY | Facility: HOSPITAL | Age: 43
Discharge: HOME | End: 2023-10-25
Payer: COMMERCIAL

## 2023-10-25 DIAGNOSIS — R20.2 PARESTHESIA: ICD-10-CM

## 2023-10-25 PROCEDURE — 95911 NRV CNDJ TEST 9-10 STUDIES: CPT

## 2023-10-25 PROCEDURE — 95886 MUSC TEST DONE W/N TEST COMP: CPT

## 2023-11-06 ENCOUNTER — TELEPHONE (OUTPATIENT)
Dept: NEUROLOGY | Facility: HOSPITAL | Age: 43
End: 2023-11-06

## 2023-11-07 ENCOUNTER — HOSPITAL ENCOUNTER (OUTPATIENT)
Dept: NEUROLOGY | Facility: HOSPITAL | Age: 43
Discharge: HOME | End: 2023-11-07
Payer: COMMERCIAL

## 2023-11-07 DIAGNOSIS — R20.2 PARESTHESIA: ICD-10-CM

## 2023-11-07 PROCEDURE — 95886 MUSC TEST DONE W/N TEST COMP: CPT

## 2023-11-07 PROCEDURE — 95911 NRV CNDJ TEST 9-10 STUDIES: CPT

## 2023-11-10 ENCOUNTER — TELEPHONE (OUTPATIENT)
Dept: FAMILY MEDICINE CLINIC | Facility: CLINIC | Age: 43
End: 2023-11-10
Payer: COMMERCIAL

## 2023-11-20 ENCOUNTER — OFFICE VISIT (OUTPATIENT)
Dept: OBSTETRICS AND GYNECOLOGY | Facility: CLINIC | Age: 43
End: 2023-11-20
Payer: COMMERCIAL

## 2023-11-20 VITALS
WEIGHT: 198 LBS | HEIGHT: 65 IN | DIASTOLIC BLOOD PRESSURE: 72 MMHG | BODY MASS INDEX: 32.99 KG/M2 | SYSTOLIC BLOOD PRESSURE: 100 MMHG

## 2023-11-20 DIAGNOSIS — R35.0 URINARY FREQUENCY: ICD-10-CM

## 2023-11-20 DIAGNOSIS — N83.299 COMPLEX OVARIAN CYST: Primary | ICD-10-CM

## 2023-11-20 RX ORDER — ESCITALOPRAM OXALATE 10 MG/1
10 TABLET ORAL DAILY
COMMUNITY
Start: 2023-10-31

## 2023-11-20 RX ORDER — TIZANIDINE 4 MG/1
TABLET ORAL
COMMUNITY
Start: 2023-11-13

## 2023-11-21 ENCOUNTER — HOSPITAL ENCOUNTER (OUTPATIENT)
Dept: PAIN MANAGEMENT | Age: 43
Discharge: HOME | End: 2023-11-21
Payer: COMMERCIAL

## 2023-11-21 VITALS
TEMPERATURE: 96.4 F | OXYGEN SATURATION: 98 % | RESPIRATION RATE: 18 BRPM | DIASTOLIC BLOOD PRESSURE: 91 MMHG | SYSTOLIC BLOOD PRESSURE: 132 MMHG | HEART RATE: 80 BPM

## 2023-11-21 DIAGNOSIS — R52 PAIN MANAGEMENT: ICD-10-CM

## 2023-11-21 PROCEDURE — 2580000003 HC RX 258

## 2023-11-21 PROCEDURE — G0260 INJ FOR SACROILIAC JT ANESTH: HCPCS

## 2023-11-21 PROCEDURE — 2500000003 HC RX 250 WO HCPCS

## 2023-11-21 PROCEDURE — A4216 STERILE WATER/SALINE, 10 ML: HCPCS

## 2023-11-21 PROCEDURE — 6360000002 HC RX W HCPCS

## 2023-11-21 RX ORDER — BUPIVACAINE HYDROCHLORIDE 5 MG/ML
2 INJECTION, SOLUTION EPIDURAL; INTRACAUDAL ONCE
Status: DISCONTINUED | OUTPATIENT
Start: 2023-11-21 | End: 2023-11-23 | Stop reason: HOSPADM

## 2023-11-21 RX ORDER — LIDOCAINE HYDROCHLORIDE 10 MG/ML
7 INJECTION, SOLUTION EPIDURAL; INFILTRATION; INTRACAUDAL; PERINEURAL ONCE
Status: DISCONTINUED | OUTPATIENT
Start: 2023-11-21 | End: 2023-11-23 | Stop reason: HOSPADM

## 2023-11-21 RX ORDER — METHYLPREDNISOLONE ACETATE 80 MG/ML
80 INJECTION, SUSPENSION INTRA-ARTICULAR; INTRALESIONAL; INTRAMUSCULAR; SOFT TISSUE ONCE
Status: DISCONTINUED | OUTPATIENT
Start: 2023-11-21 | End: 2023-11-23 | Stop reason: HOSPADM

## 2023-11-21 RX ORDER — SODIUM CHLORIDE 9 MG/ML
3 INJECTION INTRAVENOUS ONCE
Status: DISCONTINUED | OUTPATIENT
Start: 2023-11-21 | End: 2023-11-23 | Stop reason: HOSPADM

## 2023-11-21 ASSESSMENT — PAIN - FUNCTIONAL ASSESSMENT: PAIN_FUNCTIONAL_ASSESSMENT: NONE - DENIES PAIN

## 2023-11-21 NOTE — INTERVAL H&P NOTE
Update History & Physical    The patient's History and Physical  was reviewed with the patient and I examined the patient. There was no change. The surgical site was confirmed by the patient and me. Plan: The risks, benefits, expected outcome, and alternative to the recommended procedure have been discussed with the patient. Patient understands and wants to proceed with the procedure.      Electronically signed by Myron Cardenas MD on 11/21/2023 at 8:25 AM

## 2023-11-22 ENCOUNTER — TELEPHONE (OUTPATIENT)
Dept: PAIN MANAGEMENT | Age: 43
End: 2023-11-22

## 2023-11-22 ENCOUNTER — TELEPHONE (OUTPATIENT)
Dept: OBSTETRICS AND GYNECOLOGY | Facility: CLINIC | Age: 43
End: 2023-11-22
Payer: COMMERCIAL

## 2023-11-22 DIAGNOSIS — R97.8 ELEVATED TUMOR MARKERS: ICD-10-CM

## 2023-11-22 DIAGNOSIS — N83.299 COMPLEX OVARIAN CYST: Primary | ICD-10-CM

## 2023-11-22 LAB
AFP-TM SERPL-MCNC: 3.5 NG/ML (ref 0–6.4)
CANCER AG125 SERPL-ACNC: 17.2 U/ML (ref 0–38.1)
CANCER AG19-9 SERPL-ACNC: 8 U/ML (ref 0–35)
CEA SERPL-MCNC: 1.7 NG/ML
HCG INTACT+B SERPL-ACNC: <1 MIU/ML
HE4 SERPL-SCNC: 69.3 PMOL/L (ref 0–63.6)
LABORATORY COMMENT REPORT: ABNORMAL
LDH SERPL L TO P-CCNC: 214 U/L (ref 135–214)
POSTMENOPAUSAL INTERP: LOW: NORMAL
PREMENOPAUSAL INTERP: HIGH: NORMAL
ROMA SCORE POSTMEN SERPL: 1.68
ROMA SCORE PREMEN SERPL: 1.5

## 2023-11-22 RX ORDER — SOLIFENACIN SUCCINATE 5 MG/1
5 TABLET, FILM COATED ORAL DAILY
Qty: 30 TABLET | Refills: 11 | Status: SHIPPED | OUTPATIENT
Start: 2023-11-22

## 2023-11-22 NOTE — TELEPHONE ENCOUNTER
Patient sees Dr. Mago Herzog at his practice office. She had procedure yesterday and attempted to call his office, but they are closed for the holiday. She stated that she is having some symptoms that she hasn't had before with injections. She was unable to sleep last night and her face is flushed today and she has generalized symptoms of not \"feeling good\". I let her know that the steroid medication she received in the injection will cause that. I let her know that it will wear off with time, and she said she is beginning to feel a little better than when it first started. I instructed her to mention to Dr. Mago Herzog when she follows up in his office. She verbalized understanding.

## 2023-11-27 DIAGNOSIS — R97.8 ABNORMAL TUMOR MARKERS: ICD-10-CM

## 2023-11-27 DIAGNOSIS — N83.299 COMPLEX OVARIAN CYST: Primary | ICD-10-CM

## 2023-11-28 ENCOUNTER — TELEPHONE (OUTPATIENT)
Dept: OBSTETRICS AND GYNECOLOGY | Facility: CLINIC | Age: 43
End: 2023-11-28
Payer: COMMERCIAL

## 2023-12-07 ENCOUNTER — TELEPHONE (OUTPATIENT)
Dept: OBSTETRICS AND GYNECOLOGY | Facility: CLINIC | Age: 43
End: 2023-12-07
Payer: COMMERCIAL

## 2023-12-12 DIAGNOSIS — M79.18 MYOFASCIAL PAIN SYNDROME: ICD-10-CM

## 2023-12-12 RX ORDER — PREGABALIN 75 MG/1
150 CAPSULE ORAL 2 TIMES DAILY
Qty: 120 CAPSULE | Refills: 3 | Status: SHIPPED | OUTPATIENT
Start: 2023-12-12

## 2024-01-12 DIAGNOSIS — B37.31 YEAST VAGINITIS: Primary | ICD-10-CM

## 2024-01-12 RX ORDER — FLUCONAZOLE 150 MG/1
150 TABLET ORAL DAILY
Qty: 2 TABLET | Refills: 0 | Status: SHIPPED | OUTPATIENT
Start: 2024-01-12

## 2024-01-30 ENCOUNTER — TELEPHONE (OUTPATIENT)
Dept: INTERNAL MEDICINE | Facility: CLINIC | Age: 44
End: 2024-01-30

## 2024-02-13 ENCOUNTER — HOSPITAL ENCOUNTER (OUTPATIENT)
Dept: PAIN MANAGEMENT | Age: 44
Discharge: HOME | End: 2024-02-13
Payer: COMMERCIAL

## 2024-02-13 VITALS
SYSTOLIC BLOOD PRESSURE: 121 MMHG | HEART RATE: 90 BPM | TEMPERATURE: 96.7 F | OXYGEN SATURATION: 98 % | DIASTOLIC BLOOD PRESSURE: 86 MMHG | RESPIRATION RATE: 18 BRPM

## 2024-02-13 VITALS
DIASTOLIC BLOOD PRESSURE: 90 MMHG | OXYGEN SATURATION: 97 % | HEART RATE: 78 BPM | SYSTOLIC BLOOD PRESSURE: 129 MMHG | RESPIRATION RATE: 18 BRPM

## 2024-02-13 DIAGNOSIS — G89.29 OTHER CHRONIC PAIN: ICD-10-CM

## 2024-02-13 PROCEDURE — 64494 INJ PARAVERT F JNT L/S 2 LEV: CPT

## 2024-02-13 PROCEDURE — 6360000002 HC RX W HCPCS

## 2024-02-13 PROCEDURE — 64493 INJ PARAVERT F JNT L/S 1 LEV: CPT

## 2024-02-13 PROCEDURE — 2500000003 HC RX 250 WO HCPCS

## 2024-02-13 RX ORDER — BUPIVACAINE HYDROCHLORIDE 5 MG/ML
5 INJECTION, SOLUTION EPIDURAL; INTRACAUDAL ONCE
Status: DISCONTINUED | OUTPATIENT
Start: 2024-02-13 | End: 2024-02-15 | Stop reason: HOSPADM

## 2024-02-13 RX ORDER — CARISOPRODOL 350 MG/1
350 TABLET ORAL 2 TIMES DAILY
COMMUNITY
Start: 2024-01-24

## 2024-02-13 RX ORDER — LIDOCAINE HYDROCHLORIDE 10 MG/ML
20 INJECTION, SOLUTION EPIDURAL; INFILTRATION; INTRACAUDAL; PERINEURAL ONCE
Status: DISCONTINUED | OUTPATIENT
Start: 2024-02-13 | End: 2024-02-15 | Stop reason: HOSPADM

## 2024-02-13 RX ORDER — METHYLPREDNISOLONE ACETATE 80 MG/ML
80 INJECTION, SUSPENSION INTRA-ARTICULAR; INTRALESIONAL; INTRAMUSCULAR; SOFT TISSUE ONCE
Status: DISCONTINUED | OUTPATIENT
Start: 2024-02-13 | End: 2024-02-15 | Stop reason: HOSPADM

## 2024-02-13 RX ORDER — CLONAZEPAM 0.5 MG/1
0.5 TABLET ORAL 2 TIMES DAILY PRN
COMMUNITY
Start: 2024-01-25

## 2024-02-13 RX ORDER — PREGABALIN 75 MG/1
75 CAPSULE ORAL 2 TIMES DAILY
COMMUNITY
Start: 2024-02-12

## 2024-02-13 NOTE — INTERVAL H&P NOTE
Update History & Physical    The patient's History and Physical  was reviewed with the patient and I examined the patient. There was no change. The surgical site was confirmed by the patient and me.     Plan: The risks, benefits, expected outcome, and alternative to the recommended procedure have been discussed with the patient. Patient understands and wants to proceed with the procedure.     Electronically signed by Salvatore Moseley MD on 2/13/2024 at 8:57 AM

## 2024-03-06 ENCOUNTER — TELEPHONE (OUTPATIENT)
Dept: INTERNAL MEDICINE | Facility: CLINIC | Age: 44
End: 2024-03-06

## 2024-04-18 ENCOUNTER — TELEPHONE (OUTPATIENT)
Dept: INTERNAL MEDICINE | Facility: CLINIC | Age: 44
End: 2024-04-18
Payer: COMMERCIAL

## 2024-04-23 ENCOUNTER — LAB (OUTPATIENT)
Dept: LAB | Facility: HOSPITAL | Age: 44
End: 2024-04-23
Payer: COMMERCIAL

## 2024-04-23 ENCOUNTER — OFFICE VISIT (OUTPATIENT)
Dept: INTERNAL MEDICINE | Facility: CLINIC | Age: 44
End: 2024-04-23
Payer: COMMERCIAL

## 2024-04-23 VITALS
BODY MASS INDEX: 33.49 KG/M2 | DIASTOLIC BLOOD PRESSURE: 84 MMHG | TEMPERATURE: 98.6 F | OXYGEN SATURATION: 98 % | HEIGHT: 65 IN | HEART RATE: 102 BPM | SYSTOLIC BLOOD PRESSURE: 122 MMHG | WEIGHT: 201 LBS

## 2024-04-23 DIAGNOSIS — R76.8 THYROGLOBULIN ANTIBODY POSITIVE: ICD-10-CM

## 2024-04-23 DIAGNOSIS — R76.8 THYROGLOBULIN ANTIBODY POSITIVE: Primary | ICD-10-CM

## 2024-04-23 DIAGNOSIS — J30.2 SEASONAL ALLERGIES: ICD-10-CM

## 2024-04-23 PROCEDURE — 84443 ASSAY THYROID STIM HORMONE: CPT

## 2024-04-23 PROCEDURE — 99214 OFFICE O/P EST MOD 30 MIN: CPT

## 2024-04-23 PROCEDURE — 84481 FREE ASSAY (FT-3): CPT

## 2024-04-23 PROCEDURE — 84439 ASSAY OF FREE THYROXINE: CPT

## 2024-04-23 PROCEDURE — 36415 COLL VENOUS BLD VENIPUNCTURE: CPT

## 2024-04-23 RX ORDER — LEVOCETIRIZINE DIHYDROCHLORIDE 5 MG/1
5 TABLET, FILM COATED ORAL EVERY EVENING
Qty: 90 TABLET | Refills: 1 | Status: SHIPPED | OUTPATIENT
Start: 2024-04-23

## 2024-04-23 RX ORDER — CLONAZEPAM 0.5 MG/1
0.5 TABLET ORAL
COMMUNITY

## 2024-04-23 RX ORDER — OLOPATADINE HYDROCHLORIDE 1 MG/ML
1 SOLUTION/ DROPS OPHTHALMIC 2 TIMES DAILY
Qty: 5 ML | Refills: 3 | Status: SHIPPED | OUTPATIENT
Start: 2024-04-23

## 2024-04-24 LAB
T3FREE SERPL-MCNC: 2.97 PG/ML (ref 2–4.4)
T4 FREE SERPL-MCNC: 1 NG/DL (ref 0.93–1.7)
TSH SERPL DL<=0.05 MIU/L-ACNC: 0.98 UIU/ML (ref 0.27–4.2)

## 2024-05-02 ENCOUNTER — HOSPITAL ENCOUNTER (OUTPATIENT)
Dept: ULTRASOUND IMAGING | Facility: HOSPITAL | Age: 44
Discharge: HOME | End: 2024-05-02
Payer: COMMERCIAL

## 2024-05-02 PROCEDURE — 76536 US EXAM OF HEAD AND NECK: CPT

## 2024-05-03 ENCOUNTER — PATIENT MESSAGE (OUTPATIENT)
Dept: INTERNAL MEDICINE | Facility: CLINIC | Age: 44
End: 2024-05-03
Payer: COMMERCIAL

## 2024-05-03 RX ORDER — BUSPIRONE HYDROCHLORIDE 15 MG/1
15 TABLET ORAL 3 TIMES DAILY
Qty: 90 TABLET | Refills: 5 | Status: SHIPPED | OUTPATIENT
Start: 2024-05-03

## 2024-09-25 ENCOUNTER — OFFICE VISIT (OUTPATIENT)
Dept: OBSTETRICS AND GYNECOLOGY | Age: 44
End: 2024-09-25
Payer: COMMERCIAL

## 2024-09-25 VITALS
BODY MASS INDEX: 31.99 KG/M2 | HEIGHT: 65 IN | DIASTOLIC BLOOD PRESSURE: 74 MMHG | RESPIRATION RATE: 18 BRPM | SYSTOLIC BLOOD PRESSURE: 120 MMHG | WEIGHT: 192 LBS

## 2024-09-25 DIAGNOSIS — N95.1 MENOPAUSAL SYMPTOMS: Primary | ICD-10-CM

## 2024-09-25 PROCEDURE — 99213 OFFICE O/P EST LOW 20 MIN: CPT | Performed by: NURSE PRACTITIONER

## 2024-09-26 LAB
CORTIS SERPL-MCNC: 9.9 UG/DL (ref 6.2–19.4)
DHEA-S SERPL-MCNC: 69 UG/DL (ref 57.3–279.2)
ESTRADIOL SERPL-MCNC: 139 PG/ML
FSH SERPL-ACNC: 2.9 MIU/ML
LH SERPL-ACNC: 5.3 MIU/ML
PROGEST SERPL-MCNC: 13.1 NG/ML
TESTOST SERPL-MCNC: <3 NG/DL (ref 4–50)

## 2024-10-04 ENCOUNTER — OFFICE VISIT (OUTPATIENT)
Dept: OBSTETRICS AND GYNECOLOGY | Age: 44
End: 2024-10-04
Payer: COMMERCIAL

## 2024-10-04 VITALS
WEIGHT: 192 LBS | SYSTOLIC BLOOD PRESSURE: 110 MMHG | BODY MASS INDEX: 31.99 KG/M2 | DIASTOLIC BLOOD PRESSURE: 78 MMHG | HEIGHT: 65 IN

## 2024-10-04 DIAGNOSIS — R68.82 LOW LIBIDO: Primary | ICD-10-CM

## 2024-10-07 RX ORDER — BUSPIRONE HYDROCHLORIDE 15 MG/1
15 TABLET ORAL 3 TIMES DAILY
Qty: 90 TABLET | Refills: 1 | Status: SHIPPED | OUTPATIENT
Start: 2024-10-07

## 2024-10-28 ENCOUNTER — OFFICE VISIT (OUTPATIENT)
Dept: INTERNAL MEDICINE | Facility: CLINIC | Age: 44
End: 2024-10-28
Payer: COMMERCIAL

## 2024-10-28 VITALS
OXYGEN SATURATION: 100 % | HEIGHT: 65 IN | BODY MASS INDEX: 31.65 KG/M2 | WEIGHT: 190 LBS | SYSTOLIC BLOOD PRESSURE: 138 MMHG | DIASTOLIC BLOOD PRESSURE: 86 MMHG | HEART RATE: 92 BPM

## 2024-10-28 DIAGNOSIS — J01.01 ACUTE RECURRENT MAXILLARY SINUSITIS: Primary | ICD-10-CM

## 2024-10-28 PROCEDURE — 99214 OFFICE O/P EST MOD 30 MIN: CPT

## 2024-10-28 RX ORDER — METHYLPREDNISOLONE 4 MG/1
TABLET ORAL
Qty: 21 TABLET | Refills: 0 | Status: SHIPPED | OUTPATIENT
Start: 2024-10-28 | End: 2024-11-04

## 2024-10-28 RX ORDER — FLUTICASONE PROPIONATE 50 MCG
2 SPRAY, SUSPENSION (ML) NASAL DAILY
Qty: 15.8 ML | Refills: 5 | Status: SHIPPED | OUTPATIENT
Start: 2024-10-28

## 2024-10-28 NOTE — PROGRESS NOTES
"Chief Complaint   Patient presents with    Nasal Congestion     \" My nose is completely raw\". This has been ongoing since august         History:      Gladys Mcmillan is a 44 y.o. female who presents today for evaluation of the above problems.      HPI  History of Present Illness  The patient is a 44-year-old female who presents for evaluation of sinus issues.    She reports experiencing sinus problems since August 2024. She was previously diagnosed with sinusitis and prescribed an antibiotic and a steroid. However, she did not take the steroid due to a lack of trust in the provider. Despite completing the antibiotic course, her symptoms persisted with some improvement. She has been managing these symptoms for a significant period, with a brief respite of 2 to 3 weeks. She has tried various treatments, including Sinex spray and saline. For the past week, she has been waking up at 4:30 am with a sensation of a full nose, which she attributes to dryness rather than a runny nose. She reports no fever but feels warm.    She was previously under the care of Trini with Dr. Parker's office, who prescribed her Lyrica for fibromyalgia. However, she has not seen Trini since August 2024 and has run out of Lyrica. She has been taking 4 Lyrica tablets daily, but occasionally reduces the dose to 2 tablets. She reports feeling overwhelmed and has been crying frequently since running out of this medication.     ALLERGIES  She is allergic to ZITHROMAX.      ROS:  Review of Systems   HENT:  Positive for congestion, postnasal drip and sinus pressure.    Respiratory:  Negative for chest tightness and shortness of breath.    Cardiovascular:  Negative for chest pain and palpitations.   Psychiatric/Behavioral:  The patient is nervous/anxious.          Current Outpatient Medications:     amphetamine-dextroamphetamine (ADDERALL) 20 MG tablet, Take 1 tablet by mouth 2 (Two) Times a Day., Disp: , Rfl:     busPIRone (BUSPAR) 15 MG " tablet, Take 1 tablet by mouth 3 (Three) Times a Day., Disp: 90 tablet, Rfl: 1    FLUoxetine (PROzac) 20 MG capsule, TAKE 1 CAPSULE BY MOUTH EVERY DAY AS DIRECTED, Disp: , Rfl:     pregabalin (LYRICA) 75 MG capsule, Take 2 capsules by mouth 2 (Two) Times a Day., Disp: 120 capsule, Rfl: 3    tiZANidine (ZANAFLEX) 4 MG tablet, TAKE 1/4-1 TABLET BY MOUTH EVERY 6 TO 8 HOURS, Disp: , Rfl:     amoxicillin-clavulanate (AUGMENTIN) 875-125 MG per tablet, Take 1 tablet by mouth 2 (Two) Times a Day., Disp: 20 tablet, Rfl: 0    EPINEPHrine (EPIPEN) 0.3 MG/0.3ML solution auto-injector injection, Take 1 auto as needed by injection route as needed. (Patient not taking: Reported on 10/28/2024), Disp: , Rfl:     fluticasone (FLONASE) 50 MCG/ACT nasal spray, Administer 2 sprays into the nostril(s) as directed by provider Daily., Disp: 15.8 mL, Rfl: 5    levocetirizine (XYZAL) 5 MG tablet, Take 1 tablet by mouth Every Evening. (Patient not taking: Reported on 10/28/2024), Disp: 90 tablet, Rfl: 1    methylPREDNISolone (MEDROL) 4 MG dose pack, Take as directed on package instructions., Disp: 21 tablet, Rfl: 0    Lab Results   Component Value Date    GLUCOSE 100 (H) 09/14/2023    BUN 22 (H) 09/14/2023    CREATININE 0.80 10/13/2023     09/14/2023    K 3.7 09/14/2023     09/14/2023    CALCIUM 10.0 09/14/2023    PROTEINTOT 7.7 09/14/2023    ALBUMIN 4.9 09/14/2023    ALT 11 09/14/2023    AST 16 09/14/2023    ALKPHOS 67 09/14/2023    BILITOT 0.3 09/14/2023    GLOB 2.8 09/14/2023    AGRATIO 1.8 09/14/2023    BCR 25.3 (H) 09/14/2023    ANIONGAP 11.0 09/14/2023    EGFR 85.4 09/14/2023       WBC   Date Value Ref Range Status   09/14/2023 7.86 3.40 - 10.80 10*3/mm3 Final   06/04/2018 5.5 4.8 - 10.8 K/uL Final     RBC   Date Value Ref Range Status   09/14/2023 4.38 3.77 - 5.28 10*6/mm3 Final   06/04/2018 4.42 4.20 - 5.40 M/uL Final     Hemoglobin   Date Value Ref Range Status   09/14/2023 13.1 12.0 - 15.9 g/dL Final   06/04/2018 13.4  12.0 - 16.0 g/dL Final     Hematocrit   Date Value Ref Range Status   09/14/2023 40.1 34.0 - 46.6 % Final   06/04/2018 41.2 37.0 - 47.0 % Final     MCV   Date Value Ref Range Status   09/14/2023 91.6 79.0 - 97.0 fL Final   06/04/2018 93.2 81.0 - 99.0 fL Final     MCH   Date Value Ref Range Status   09/14/2023 29.9 26.6 - 33.0 pg Final   06/04/2018 30.3 27.0 - 31.0 pg Final     MCHC   Date Value Ref Range Status   09/14/2023 32.7 31.5 - 35.7 g/dL Final   06/04/2018 32.5 (L) 33.0 - 37.0 g/dL Final     RDW   Date Value Ref Range Status   09/14/2023 11.9 (L) 12.3 - 15.4 % Final   06/04/2018 11.8 11.5 - 14.5 % Final     RDW-SD   Date Value Ref Range Status   09/14/2023 40.0 37.0 - 54.0 fl Final     MPV   Date Value Ref Range Status   09/14/2023 9.2 6.0 - 12.0 fL Final   06/04/2018 10.2 9.4 - 12.3 fL Final     Platelets   Date Value Ref Range Status   09/14/2023 327 140 - 450 10*3/mm3 Final   06/04/2018 273 130 - 400 K/uL Final     Neutrophil Rel %   Date Value Ref Range Status   06/04/2018 71.7 (H) 50.0 - 65.0 % Final     Neutrophil %   Date Value Ref Range Status   03/12/2020 53.7 42.7 - 76.0 % Final     Lymphocyte Rel %   Date Value Ref Range Status   06/04/2018 20.0 20.0 - 40.0 % Final     Lymphocyte %   Date Value Ref Range Status   03/12/2020 36.6 19.6 - 45.3 % Final     Monocyte Rel %   Date Value Ref Range Status   06/04/2018 6.0 0.0 - 10.0 % Final     Monocyte %   Date Value Ref Range Status   03/12/2020 5.8 5.0 - 12.0 % Final     Eosinophil Rel %   Date Value Ref Range Status   06/04/2018 1.1 0.0 - 5.0 % Final     Eosinophil %   Date Value Ref Range Status   03/12/2020 2.5 0.3 - 6.2 % Final     Basophil Rel %   Date Value Ref Range Status   06/04/2018 0.7 0.0 - 1.0 % Final     Basophil %   Date Value Ref Range Status   03/12/2020 1.2 0.0 - 1.5 % Final     Immature Grans %   Date Value Ref Range Status   03/12/2020 0.2 0.0 - 0.5 % Final     Neutrophils Absolute   Date Value Ref Range Status   06/04/2018 4.0 1.5 -  "7.5 K/uL Final     Neutrophils, Absolute   Date Value Ref Range Status   03/12/2020 2.58 1.70 - 7.00 10*3/mm3 Final     Lymphocytes Absolute   Date Value Ref Range Status   06/04/2018 1.1 1.1 - 4.5 K/uL Final     Lymphocytes, Absolute   Date Value Ref Range Status   03/12/2020 1.76 0.70 - 3.10 10*3/mm3 Final     Monocytes Absolute   Date Value Ref Range Status   06/04/2018 0.30 0.00 - 0.90 K/uL Final     Monocytes, Absolute   Date Value Ref Range Status   03/12/2020 0.28 0.10 - 0.90 10*3/mm3 Final     Eosinophils Absolute   Date Value Ref Range Status   06/04/2018 0.10 0.00 - 0.60 K/uL Final     Eosinophils, Absolute   Date Value Ref Range Status   03/12/2020 0.12 0.00 - 0.40 10*3/mm3 Final     Basophils Absolute   Date Value Ref Range Status   06/04/2018 0.00 0.00 - 0.20 K/uL Final     Basophils, Absolute   Date Value Ref Range Status   03/12/2020 0.06 0.00 - 0.20 10*3/mm3 Final     Immature Grans, Absolute   Date Value Ref Range Status   03/12/2020 0.01 0.00 - 0.05 10*3/mm3 Final     nRBC   Date Value Ref Range Status   03/12/2020 0.0 0.0 - 0.2 /100 WBC Final       OBJECTIVE:  Visit Vitals  /86 (BP Location: Left arm, Patient Position: Sitting, Cuff Size: Adult)   Pulse 92   Ht 165.1 cm (65\")   Wt 86.2 kg (190 lb)   LMP 11/01/2023   SpO2 100%   BMI 31.62 kg/m²      Physical Exam  Constitutional:       Appearance: Normal appearance.   HENT:      Head: Normocephalic.      Right Ear: Tympanic membrane normal. A middle ear effusion is present.      Left Ear: Tympanic membrane normal. A middle ear effusion is present.      Nose: Nose normal.      Mouth/Throat:      Mouth: Mucous membranes are moist.   Cardiovascular:      Rate and Rhythm: Normal rate and regular rhythm.      Pulses: Normal pulses.      Heart sounds: Normal heart sounds.   Pulmonary:      Effort: Pulmonary effort is normal.      Breath sounds: Normal breath sounds.   Abdominal:      General: Bowel sounds are normal.      Palpations: Abdomen is " soft.   Musculoskeletal:         General: Normal range of motion.      Cervical back: Normal range of motion.   Skin:     General: Skin is warm and dry.   Neurological:      Mental Status: She is alert and oriented to person, place, and time.   Psychiatric:         Mood and Affect: Mood normal.         Behavior: Behavior normal.         Thought Content: Thought content normal.         Judgment: Judgment normal.       Assessment/Plan    Diagnoses and all orders for this visit:    1. Acute recurrent maxillary sinusitis (Primary)  -     amoxicillin-clavulanate (AUGMENTIN) 875-125 MG per tablet; Take 1 tablet by mouth 2 (Two) Times a Day.  Dispense: 20 tablet; Refill: 0  -     methylPREDNISolone (MEDROL) 4 MG dose pack; Take as directed on package instructions.  Dispense: 21 tablet; Refill: 0  -     fluticasone (FLONASE) 50 MCG/ACT nasal spray; Administer 2 sprays into the nostril(s) as directed by provider Daily.  Dispense: 15.8 mL; Refill: 5      Assessment & Plan  1. Sinusitis.  The patient reports persistent sinus issues since August, with symptoms including nasal congestion, dryness, and a sensation of fullness. Previous treatment with an antibiotic and a steroid was partially effective. She has been using saline spray and a humidifier but continues to experience symptoms. A 10-day course of Augmentin (amoxicillin/clavulanate) has been prescribed. She is also prescribed a Medrol Dosepak and Flonase, to be used 15 minutes after twice-daily saline nasal rinses. Xyzal is recommended once daily. She is advised to use Vaseline to alleviate the burning sensation in her nostrils. If symptoms persist, a referral to an ENT specialist will be considered.    2. Sleep Apnea.  The patient mentions having sleep apnea and not using her CPAP mask regularly. She is advised to use her CPAP mask as prescribed to improve her symptoms.    3. Medication Management.  The patient has run out of Dr. Elena Collier's office took over this  prescription earlier this year. She is advised to contact her Dr. Parker's office to confirm if they will continue refilling her Lyrica prescription.  We also contacted Dr. Parker's office, they reported that they were able to continue refilling this medication but the patient has not requested a refill.  We did contact the patient via phone and inform her that she would need to continue refilling with Dr. Parker's office since they have taken over this prescription.       Return if symptoms worsen or fail to improve.      MARTHA Jimenez  15:53 CDT  10/29/2024   Electronically signed      Patient or patient representative verbalized consent for the use of Ambient Listening during the visit with  MARTHA Jimenez for chart documentation. 10/29/2024  09:02 CDT

## 2024-10-29 DIAGNOSIS — M79.18 MYOFASCIAL PAIN SYNDROME: ICD-10-CM

## 2024-10-29 RX ORDER — PREGABALIN 75 MG/1
150 CAPSULE ORAL 2 TIMES DAILY
Qty: 120 CAPSULE | Refills: 3 | Status: CANCELLED | OUTPATIENT
Start: 2024-10-29

## 2024-10-29 NOTE — TELEPHONE ENCOUNTER
Called Dr. Parker office. They are filling this medication for pt and said she just needs to call them and request a refill.    Called and spoke with pt. Let her know to call Dr. Parker office and request refill. She v/u

## 2024-10-29 NOTE — TELEPHONE ENCOUNTER
Caller: Gladys Mcmillan    Relationship: Self    Best call back number: [phone]     Requested Prescriptions: pregabalin (LYRICA) 75 MG capsule          Pharmacy where request should be sent: [id number] - [place] - [address] [phone] [facility] [phone] [facility]     Last office visit with prescribing clinician: 10/20/2023   Last telemedicine visit with prescribing clinician: Visit date not found   Next office visit with prescribing clinician: Visit date not found     PATIENT IS COMPLETELY OUT AND HASN'T HAD THE MEDICATION SINCE SUNDAY. PLEASE CALL BACK WHEN COMPLETED.     Does the patient have less than a 3 day supply:  [x] Yes  [] No    Would you like a call back once the refill request has been completed: [x] Yes [] No    If the office needs to give you a call back, can they leave a voicemail: [x] Yes [] No    Timoteo Kahn Rep   10/29/24 08:31 CDT

## 2024-10-29 NOTE — TELEPHONE ENCOUNTER
Dr. Parker's office has been filling this, can we contact their office and find out if they are planning to continue filling this medication. The patient was unsure at her appointment yesterday.

## 2024-10-29 NOTE — TELEPHONE ENCOUNTER
PATIENT HAS BEEN CALLED, SHE IS NEEDING HER LYRICA FILLED,  MEDICATION HAS BEEN SENT TO PROVIDER FOR REFILL APPROVAL.

## 2024-11-04 ENCOUNTER — OFFICE VISIT (OUTPATIENT)
Dept: OBSTETRICS AND GYNECOLOGY | Age: 44
End: 2024-11-04
Payer: COMMERCIAL

## 2024-11-04 VITALS
SYSTOLIC BLOOD PRESSURE: 116 MMHG | HEIGHT: 65 IN | WEIGHT: 189 LBS | BODY MASS INDEX: 31.49 KG/M2 | DIASTOLIC BLOOD PRESSURE: 78 MMHG

## 2024-11-04 DIAGNOSIS — R79.89 LOW TESTOSTERONE: ICD-10-CM

## 2024-11-04 DIAGNOSIS — E66.811 CLASS 1 OBESITY WITHOUT SERIOUS COMORBIDITY WITH BODY MASS INDEX (BMI) OF 31.0 TO 31.9 IN ADULT, UNSPECIFIED OBESITY TYPE: Primary | ICD-10-CM

## 2024-11-04 DIAGNOSIS — N39.46 MIXED INCONTINENCE: ICD-10-CM

## 2024-11-04 RX ORDER — SEMAGLUTIDE 0.25 MG/.5ML
0.25 INJECTION, SOLUTION SUBCUTANEOUS WEEKLY
Qty: 2 ML | Refills: 0 | Status: SHIPPED | OUTPATIENT
Start: 2024-11-04

## 2024-11-07 ENCOUNTER — OFFICE VISIT (OUTPATIENT)
Dept: GASTROENTEROLOGY | Facility: CLINIC | Age: 44
End: 2024-11-07
Payer: COMMERCIAL

## 2024-11-07 VITALS
WEIGHT: 192.2 LBS | HEART RATE: 103 BPM | TEMPERATURE: 97.5 F | SYSTOLIC BLOOD PRESSURE: 130 MMHG | DIASTOLIC BLOOD PRESSURE: 80 MMHG | HEIGHT: 65 IN | BODY MASS INDEX: 32.02 KG/M2 | OXYGEN SATURATION: 98 %

## 2024-11-07 DIAGNOSIS — Z79.1 NSAID LONG-TERM USE: ICD-10-CM

## 2024-11-07 DIAGNOSIS — K21.00 GASTROESOPHAGEAL REFLUX DISEASE WITH ESOPHAGITIS WITHOUT HEMORRHAGE: Primary | ICD-10-CM

## 2024-11-07 DIAGNOSIS — R49.9 VOICE DISTURBANCE: ICD-10-CM

## 2024-11-07 DIAGNOSIS — Z71.6 TOBACCO ABUSE COUNSELING: ICD-10-CM

## 2024-11-07 RX ORDER — PANTOPRAZOLE SODIUM 40 MG/1
40 TABLET, DELAYED RELEASE ORAL DAILY
Qty: 30 TABLET | Refills: 11 | Status: SHIPPED | OUTPATIENT
Start: 2024-11-07

## 2024-11-07 NOTE — H&P (VIEW-ONLY)
Gladys Mcmillan  1980 11/7/2024  Chief Complaint   Patient presents with    GI Problem     Clearing throat constantly-hx of silent reflux     Subjective   HPI  Gladys Mcmillan is a 44 y.o. female who presents with a complaint of flare in her reflux. Moderate to severe at times. She has clearing of her throat often throughout the day. She has burning and voice disturbance. She was on Omeprazole daily for acid reflux in the past. No nausea or vomiting. No abdominal pain. No wt loss. No melena. No brbpr. No change in bowels. She does vape which is a risk factor. She also takes NSAIDS most days 800 mg at a time. She has had a fractured hip and lower back pain.    Past Medical History:   Diagnosis Date    ADD (attention deficit disorder)     Anxiety     Fibromyalgia, primary     Possibly?    GERD (gastroesophageal reflux disease)     Headache     HL (hearing loss)     Hypertension [id number]    Hypertension last spring    Irritable bowel syndrome     Kidney stone     Just once.    Low back pain     Neuromuscular disorder     Tingling and cramping daily    Osteopenia     Urinary tract infection     UTI a couple times in the past    Visual impairment     Vitamin B 12 deficiency      Past Surgical History:   Procedure Laterality Date    BREAST BIOPSY Right     benign    CHOLECYSTECTOMY      COLONOSCOPY  03/18/2016    Hemorrhoids otherwise normal exam repeat in 10 years    COLONOSCOPY N/A 05/21/2021    Dr. sargent- One 6 mm hyperplastic  polyp at 70 cm proximal to the anus, - One 3 mm polyp at 40 cm proximal to the anus,Superficial fragment of benign colonic mucosa demonstrating mild glandular hyperplastic changes-    FRACTURE SURGERY      HIP FRACTURE SURGERY Left     HYSTERECTOMY      TVH/    TONSILLECTOMY AND ADENOIDECTOMY         Outpatient Medications Marked as Taking for the 11/7/24 encounter (Office Visit) with Lucrecia Liu APRN   Medication Sig Dispense Refill    amoxicillin-clavulanate  (AUGMENTIN) 875-125 MG per tablet Take 1 tablet by mouth 2 (Two) Times a Day. 20 tablet 0    AMPHETAMINE-DEXTROAMPHETAMINE PO Take 40 mg by mouth 2 (Two) Times a Day.      EPINEPHrine (EPIPEN) 0.3 MG/0.3ML solution auto-injector injection       FLUoxetine (PROzac) 40 MG capsule Take 1 capsule by mouth Daily.      fluticasone (FLONASE) 50 MCG/ACT nasal spray Administer 2 sprays into the nostril(s) as directed by provider Daily. 15.8 mL 5    levocetirizine (XYZAL) 5 MG tablet Take 1 tablet by mouth Every Evening. 90 tablet 1    pregabalin (LYRICA) 75 MG capsule Take 2 capsules by mouth 2 (Two) Times a Day. (Patient taking differently: Take 1 capsule by mouth 4 (Four) Times a Day.) 120 capsule 3    tiZANidine (ZANAFLEX) 4 MG tablet TAKE 1/4-1 TABLET BY MOUTH EVERY 6 TO 8 HOURS       Allergies   Allergen Reactions    Azithromycin Shortness Of Breath and Hives    Flavoring Agent Anaphylaxis    Strawberry Anaphylaxis    Butorphanol Hallucinations     Social History     Socioeconomic History    Marital status:    Tobacco Use    Smoking status: Every Day     Current packs/day: 0.50     Average packs/day: 0.5 packs/day for 24.5 years (12.3 ttl pk-yrs)     Types: Electronic Cigarette, Cigarettes     Start date: 5/4/2000    Smokeless tobacco: Current    Tobacco comments:     Vapes   Vaping Use    Vaping status: Every Day    Substances: Nicotine    Devices: Disposable   Substance and Sexual Activity    Alcohol use: No    Drug use: No    Sexual activity: Yes     Partners: Male     Birth control/protection: Vasectomy, Hysterectomy     Comment: partner had vasectomy      Family History   Problem Relation Age of Onset    Thyroid disease Mother     Hyperlipidemia Mother     Miscarriages / Stillbirths Mother     Anxiety disorder Father     Arthritis Father         Seronegative Rheumatoid Athritis    No Known Problems Brother     Breast cancer Maternal Grandmother 60        Had right breast removed     Diabetes Maternal  Grandmother             Arthritis Maternal Grandmother             Cancer Maternal Grandmother         Breast/Lukemia    Depression Maternal Grandmother             Hearing loss Maternal Grandmother             Heart disease Maternal Grandmother             Colon cancer Maternal Grandfather 60    Cancer Maternal Grandfather         Colon    COPD Maternal Grandfather             Hearing loss Maternal Grandfather             Hyperlipidemia Maternal Grandfather             Colon cancer Paternal Grandmother     Skin cancer Paternal Grandmother     Colon polyps Paternal Grandmother     Cancer Paternal Grandmother         Colon    Diabetes Paternal Grandfather 60            Cancer Paternal Grandfather 60    Hearing loss Maternal Aunt         Before 40    Ovarian cancer Neg Hx     Uterine cancer Neg Hx     Melanoma Neg Hx      Health Maintenance   Topic Date Due    HEPATITIS C SCREENING  Never done    ANNUAL PHYSICAL  Never done    INFLUENZA VACCINE  2024    COVID-19 Vaccine ( season) Never done    DXA SCAN  2024    Pneumococcal Vaccine 0-64 (1 of 2 - PCV) 10/28/2025 (Originally 1987)    TDAP/TD VACCINES (2 - Tdap) 10/28/2025 (Originally 1992)    MAMMOGRAM  2025    BMI FOLLOWUP  2025    COLORECTAL CANCER SCREENING  2026    PAP SMEAR  2026     Review of Systems   Constitutional:  Negative for activity change, appetite change, chills, diaphoresis, fatigue, fever and unexpected weight change.   HENT:  Positive for voice change. Negative for ear pain, hearing loss, mouth sores, sore throat and trouble swallowing.    Eyes: Negative.    Respiratory:  Negative for cough, choking, shortness of breath and wheezing.    Cardiovascular:  Negative for chest pain and palpitations.   Gastrointestinal:  Negative for abdominal pain, blood in stool, constipation, diarrhea, nausea and vomiting.        Acid reflux  "  Endocrine: Negative for cold intolerance and heat intolerance.   Genitourinary:  Negative for decreased urine volume, dysuria, frequency, hematuria and urgency.   Musculoskeletal:  Negative for back pain, gait problem and myalgias.   Skin:  Negative for color change, pallor and rash.   Allergic/Immunologic: Negative for food allergies and immunocompromised state.   Neurological:  Negative for dizziness, tremors, seizures, syncope, weakness, light-headedness, numbness and headaches.   Hematological:  Negative for adenopathy. Does not bruise/bleed easily.   Psychiatric/Behavioral:  Negative for agitation and confusion. The patient is not nervous/anxious.    All other systems reviewed and are negative.    Objective   Vitals:    11/07/24 0953   BP: 130/80   BP Location: Left arm   Pulse: 103   Temp: 97.5 °F (36.4 °C)   TempSrc: Temporal   SpO2: 98%   Weight: 87.2 kg (192 lb 3.2 oz)   Height: 165.1 cm (65\")     Body mass index is 31.98 kg/m².  Physical Exam  Constitutional:       Appearance: She is well-developed.   HENT:      Head: Normocephalic and atraumatic.   Eyes:      Pupils: Pupils are equal, round, and reactive to light.   Neck:      Trachea: No tracheal deviation.   Cardiovascular:      Rate and Rhythm: Normal rate and regular rhythm.      Heart sounds: Normal heart sounds. No murmur heard.     No friction rub. No gallop.   Pulmonary:      Effort: Pulmonary effort is normal. No respiratory distress.      Breath sounds: Normal breath sounds. No wheezing or rales.   Chest:      Chest wall: No tenderness.   Abdominal:      General: Bowel sounds are normal. There is no distension.      Palpations: Abdomen is soft. Abdomen is not rigid.      Tenderness: There is no abdominal tenderness. There is no guarding or rebound.   Musculoskeletal:         General: No tenderness or deformity. Normal range of motion.      Cervical back: Normal range of motion and neck supple.   Skin:     General: Skin is warm and dry.      " Coloration: Skin is not pale.      Findings: No rash.   Neurological:      Mental Status: She is alert and oriented to person, place, and time.      Deep Tendon Reflexes: Reflexes are normal and symmetric.   Psychiatric:         Behavior: Behavior normal.         Thought Content: Thought content normal.         Judgment: Judgment normal.       Assessment & Plan   Diagnoses and all orders for this visit:    1. Gastroesophageal reflux disease with esophagitis without hemorrhage (Primary)  -     Case Request; Standing  -     Case Request    2. Voice disturbance    3. NSAID long-term use    4. Tobacco abuse counseling    Other orders  -     Implement Anesthesia Orders Day of Procedure; Standing  -     Follow Anesthesia Guidelines / Protocol; Future  -     pantoprazole (PROTONIX) 40 MG EC tablet; Take 1 tablet by mouth Daily.  Dispense: 30 tablet; Refill: 11    I discussed non pharmaceutical treatment of gerd.  This includes gradually losing weight to achieve ideal body wt., elevation of the head of bed by 4-6 inches, nothing to eat or drink 3 hours prior to lying down, avoiding tight clothing, stress reduction, tobacco cessation, reduction of alcohol intake, and dietary restrictions (avoiding caffeine, coffee, fatty foods, mints, chocolate, spicy foods and tomato based sauces as much as possible).  Will start Protonix  Advised her to see ENT if she continue to have voice disturbance and throat clearing.   Increase water intake  Advised to stop vaping.     ESOPHAGOGASTRODUODENOSCOPY WITH ANESTHESIA (N/A)  Part of this note may be an electronic transcription/translation of spoken language to printed text using the Dragon Dictation System.  Body mass index is 31.98 kg/m².  Return if symptoms worsen or fail to improve.           All risks, benefits, alternatives, and indications of colonoscopy and/or Endoscopy procedure have been discussed with the patient. Risks to include perforation of the colon requiring possible  surgery or colostomy, risk of bleeding from biopsies or removal of colon tissue, possibility of missing a colon polyp or cancer, or adverse drug reaction.  Benefits to include the diagnosis and management of disease of the colon and rectum. Alternatives to include barium enema, radiographic evaluation, lab testing or no intervention. Pt verbalizes understanding and agrees.     Lucrecia Liu, APRN  11/7/2024  10:24 CST          If you smoke or use tobacco, 4 minutes reading provided  Steps to Quit Smoking  Smoking tobacco can be harmful to your health and can affect almost every organ in your body. Smoking puts you, and those around you, at risk for developing many serious chronic diseases. Quitting smoking is difficult, but it is one of the best things that you can do for your health. It is never too late to quit.  What are the benefits of quitting smoking?  When you quit smoking, you lower your risk of developing serious diseases and conditions, such as:  Lung cancer or lung disease, such as COPD.  Heart disease.  Stroke.  Heart attack.  Infertility.  Osteoporosis and bone fractures.  Additionally, symptoms such as coughing, wheezing, and shortness of breath may get better when you quit. You may also find that you get sick less often because your body is stronger at fighting off colds and infections. If you are pregnant, quitting smoking can help to reduce your chances of having a baby of low birth weight.  How do I get ready to quit?  When you decide to quit smoking, create a plan to make sure that you are successful. Before you quit:  Pick a date to quit. Set a date within the next two weeks to give you time to prepare.  Write down the reasons why you are quitting. Keep this list in places where you will see it often, such as on your bathroom mirror or in your car or wallet.  Identify the people, places, things, and activities that make you want to smoke (triggers) and avoid them. Make sure to take these  actions:  Throw away all cigarettes at home, at work, and in your car.  Throw away smoking accessories, such as ashtrays and lighters.  Clean your car and make sure to empty the ashtray.  Clean your home, including curtains and carpets.  Tell your family, friends, and coworkers that you are quitting. Support from your loved ones can make quitting easier.  Talk with your health care provider about your options for quitting smoking.  Find out what treatment options are covered by your health insurance.  What strategies can I use to quit smoking?  Talk with your healthcare provider about different strategies to quit smoking. Some strategies include:  Quitting smoking altogether instead of gradually lessening how much you smoke over a period of time. Research shows that quitting “cold turkey” is more successful than gradually quitting.  Attending in-person counseling to help you build problem-solving skills. You are more likely to have success in quitting if you attend several counseling sessions. Even short sessions of 10 minutes can be effective.  Finding resources and support systems that can help you to quit smoking and remain smoke-free after you quit. These resources are most helpful when you use them often. They can include:  Online chats with a counselor.  Telephone quitlines.  Printed self-help materials.  Support groups or group counseling.  Text messaging programs.  Mobile phone applications.  Taking medicines to help you quit smoking. (If you are pregnant or breastfeeding, talk with your health care provider first.) Some medicines contain nicotine and some do not. Both types of medicines help with cravings, but the medicines that include nicotine help to relieve withdrawal symptoms. Your health care provider may recommend:  Nicotine patches, gum, or lozenges.  Nicotine inhalers or sprays.  Non-nicotine medicine that is taken by mouth.  Talk with your health care provider about combining strategies, such as  taking medicines while you are also receiving in-person counseling. Using these two strategies together makes you more likely to succeed in quitting than if you used either strategy on its own.  If you are pregnant or breastfeeding, talk with your health care provider about finding counseling or other support strategies to quit smoking. Do not take medicine to help you quit smoking unless told to do so by your health care provider.  What things can I do to make it easier to quit?  Quitting smoking might feel overwhelming at first, but there is a lot that you can do to make it easier. Take these important actions:  Reach out to your family and friends and ask that they support and encourage you during this time. Call telephone quitlines, reach out to support groups, or work with a counselor for support.  Ask people who smoke to avoid smoking around you.  Avoid places that trigger you to smoke, such as bars, parties, or smoke-break areas at work.  Spend time around people who do not smoke.  Lessen stress in your life, because stress can be a smoking trigger for some people. To lessen stress, try:  Exercising regularly.  Deep-breathing exercises.  Yoga.  Meditating.  Performing a body scan. This involves closing your eyes, scanning your body from head to toe, and noticing which parts of your body are particularly tense. Purposefully relax the muscles in those areas.  Download or purchase mobile phone or tablet apps (applications) that can help you stick to your quit plan by providing reminders, tips, and encouragement. There are many free apps, such as QuitGuide from the CDC (Centers for Disease Control and Prevention). You can find other support for quitting smoking (smoking cessation) through smokefree.gov and other websites.  How will I feel when I quit smoking?  Within the first 24 hours of quitting smoking, you may start to feel some withdrawal symptoms. These symptoms are usually most noticeable 2-3 days after  quitting, but they usually do not last beyond 2-3 weeks. Changes or symptoms that you might experience include:  Mood swings.  Restlessness, anxiety, or irritation.  Difficulty concentrating.  Dizziness.  Strong cravings for sugary foods in addition to nicotine.  Mild weight gain.  Constipation.  Nausea.  Coughing or a sore throat.  Changes in how your medicines work in your body.  A depressed mood.  Difficulty sleeping (insomnia).  After the first 2-3 weeks of quitting, you may start to notice more positive results, such as:  Improved sense of smell and taste.  Decreased coughing and sore throat.  Slower heart rate.  Lower blood pressure.  Clearer skin.  The ability to breathe more easily.  Fewer sick days.  Quitting smoking is very challenging for most people. Do not get discouraged if you are not successful the first time. Some people need to make many attempts to quit before they achieve long-term success. Do your best to stick to your quit plan, and talk with your health care provider if you have any questions or concerns.  This information is not intended to replace advice given to you by your health care provider. Make sure you discuss any questions you have with your health care provider.  Document Released: 12/12/2002 Document Revised: 08/15/2017 Document Reviewed: 05/03/2016  [employer] Interactive Patient Education © 2017 [employer]

## 2024-11-07 NOTE — PROGRESS NOTES
Gladys Mcmillan  1980 11/7/2024  Chief Complaint   Patient presents with    GI Problem     Clearing throat constantly-hx of silent reflux     Subjective   HPI  Gladys Mcmillan is a 44 y.o. female who presents with a complaint of flare in her reflux. Moderate to severe at times. She has clearing of her throat often throughout the day. She has burning and voice disturbance. She was on Omeprazole daily for acid reflux in the past. No nausea or vomiting. No abdominal pain. No wt loss. No melena. No brbpr. No change in bowels. She does vape which is a risk factor. She also takes NSAIDS most days 800 mg at a time. She has had a fractured hip and lower back pain.    Past Medical History:   Diagnosis Date    ADD (attention deficit disorder)     Anxiety     Fibromyalgia, primary     Possibly?    GERD (gastroesophageal reflux disease)     Headache     HL (hearing loss)     Hypertension [id number]    Hypertension last spring    Irritable bowel syndrome     Kidney stone     Just once.    Low back pain     Neuromuscular disorder     Tingling and cramping daily    Osteopenia     Urinary tract infection     UTI a couple times in the past    Visual impairment     Vitamin B 12 deficiency      Past Surgical History:   Procedure Laterality Date    BREAST BIOPSY Right     benign    CHOLECYSTECTOMY      COLONOSCOPY  03/18/2016    Hemorrhoids otherwise normal exam repeat in 10 years    COLONOSCOPY N/A 05/21/2021    Dr. sargent- One 6 mm hyperplastic  polyp at 70 cm proximal to the anus, - One 3 mm polyp at 40 cm proximal to the anus,Superficial fragment of benign colonic mucosa demonstrating mild glandular hyperplastic changes-    FRACTURE SURGERY      HIP FRACTURE SURGERY Left     HYSTERECTOMY      TVH/    TONSILLECTOMY AND ADENOIDECTOMY         Outpatient Medications Marked as Taking for the 11/7/24 encounter (Office Visit) with Lucrecia Liu APRN   Medication Sig Dispense Refill    amoxicillin-clavulanate  (AUGMENTIN) 875-125 MG per tablet Take 1 tablet by mouth 2 (Two) Times a Day. 20 tablet 0    AMPHETAMINE-DEXTROAMPHETAMINE PO Take 40 mg by mouth 2 (Two) Times a Day.      EPINEPHrine (EPIPEN) 0.3 MG/0.3ML solution auto-injector injection       FLUoxetine (PROzac) 40 MG capsule Take 1 capsule by mouth Daily.      fluticasone (FLONASE) 50 MCG/ACT nasal spray Administer 2 sprays into the nostril(s) as directed by provider Daily. 15.8 mL 5    levocetirizine (XYZAL) 5 MG tablet Take 1 tablet by mouth Every Evening. 90 tablet 1    pregabalin (LYRICA) 75 MG capsule Take 2 capsules by mouth 2 (Two) Times a Day. (Patient taking differently: Take 1 capsule by mouth 4 (Four) Times a Day.) 120 capsule 3    tiZANidine (ZANAFLEX) 4 MG tablet TAKE 1/4-1 TABLET BY MOUTH EVERY 6 TO 8 HOURS       Allergies   Allergen Reactions    Azithromycin Shortness Of Breath and Hives    Flavoring Agent Anaphylaxis    Strawberry Anaphylaxis    Butorphanol Hallucinations     Social History     Socioeconomic History    Marital status:    Tobacco Use    Smoking status: Every Day     Current packs/day: 0.50     Average packs/day: 0.5 packs/day for 24.5 years (12.3 ttl pk-yrs)     Types: Electronic Cigarette, Cigarettes     Start date: 5/4/2000    Smokeless tobacco: Current    Tobacco comments:     Vapes   Vaping Use    Vaping status: Every Day    Substances: Nicotine    Devices: Disposable   Substance and Sexual Activity    Alcohol use: No    Drug use: No    Sexual activity: Yes     Partners: Male     Birth control/protection: Vasectomy, Hysterectomy     Comment: partner had vasectomy      Family History   Problem Relation Age of Onset    Thyroid disease Mother     Hyperlipidemia Mother     Miscarriages / Stillbirths Mother     Anxiety disorder Father     Arthritis Father         Seronegative Rheumatoid Athritis    No Known Problems Brother     Breast cancer Maternal Grandmother 60        Had right breast removed     Diabetes Maternal  Grandmother             Arthritis Maternal Grandmother             Cancer Maternal Grandmother         Breast/Lukemia    Depression Maternal Grandmother             Hearing loss Maternal Grandmother             Heart disease Maternal Grandmother             Colon cancer Maternal Grandfather 60    Cancer Maternal Grandfather         Colon    COPD Maternal Grandfather             Hearing loss Maternal Grandfather             Hyperlipidemia Maternal Grandfather             Colon cancer Paternal Grandmother     Skin cancer Paternal Grandmother     Colon polyps Paternal Grandmother     Cancer Paternal Grandmother         Colon    Diabetes Paternal Grandfather 60            Cancer Paternal Grandfather 60    Hearing loss Maternal Aunt         Before 40    Ovarian cancer Neg Hx     Uterine cancer Neg Hx     Melanoma Neg Hx      Health Maintenance   Topic Date Due    HEPATITIS C SCREENING  Never done    ANNUAL PHYSICAL  Never done    INFLUENZA VACCINE  2024    COVID-19 Vaccine ( season) Never done    DXA SCAN  2024    Pneumococcal Vaccine 0-64 (1 of 2 - PCV) 10/28/2025 (Originally 1987)    TDAP/TD VACCINES (2 - Tdap) 10/28/2025 (Originally 1992)    MAMMOGRAM  2025    BMI FOLLOWUP  2025    COLORECTAL CANCER SCREENING  2026    PAP SMEAR  2026     Review of Systems   Constitutional:  Negative for activity change, appetite change, chills, diaphoresis, fatigue, fever and unexpected weight change.   HENT:  Positive for voice change. Negative for ear pain, hearing loss, mouth sores, sore throat and trouble swallowing.    Eyes: Negative.    Respiratory:  Negative for cough, choking, shortness of breath and wheezing.    Cardiovascular:  Negative for chest pain and palpitations.   Gastrointestinal:  Negative for abdominal pain, blood in stool, constipation, diarrhea, nausea and vomiting.        Acid reflux  "  Endocrine: Negative for cold intolerance and heat intolerance.   Genitourinary:  Negative for decreased urine volume, dysuria, frequency, hematuria and urgency.   Musculoskeletal:  Negative for back pain, gait problem and myalgias.   Skin:  Negative for color change, pallor and rash.   Allergic/Immunologic: Negative for food allergies and immunocompromised state.   Neurological:  Negative for dizziness, tremors, seizures, syncope, weakness, light-headedness, numbness and headaches.   Hematological:  Negative for adenopathy. Does not bruise/bleed easily.   Psychiatric/Behavioral:  Negative for agitation and confusion. The patient is not nervous/anxious.    All other systems reviewed and are negative.    Objective   Vitals:    11/07/24 0953   BP: 130/80   BP Location: Left arm   Pulse: 103   Temp: 97.5 °F (36.4 °C)   TempSrc: Temporal   SpO2: 98%   Weight: 87.2 kg (192 lb 3.2 oz)   Height: 165.1 cm (65\")     Body mass index is 31.98 kg/m².  Physical Exam  Constitutional:       Appearance: She is well-developed.   HENT:      Head: Normocephalic and atraumatic.   Eyes:      Pupils: Pupils are equal, round, and reactive to light.   Neck:      Trachea: No tracheal deviation.   Cardiovascular:      Rate and Rhythm: Normal rate and regular rhythm.      Heart sounds: Normal heart sounds. No murmur heard.     No friction rub. No gallop.   Pulmonary:      Effort: Pulmonary effort is normal. No respiratory distress.      Breath sounds: Normal breath sounds. No wheezing or rales.   Chest:      Chest wall: No tenderness.   Abdominal:      General: Bowel sounds are normal. There is no distension.      Palpations: Abdomen is soft. Abdomen is not rigid.      Tenderness: There is no abdominal tenderness. There is no guarding or rebound.   Musculoskeletal:         General: No tenderness or deformity. Normal range of motion.      Cervical back: Normal range of motion and neck supple.   Skin:     General: Skin is warm and dry.      " Coloration: Skin is not pale.      Findings: No rash.   Neurological:      Mental Status: She is alert and oriented to person, place, and time.      Deep Tendon Reflexes: Reflexes are normal and symmetric.   Psychiatric:         Behavior: Behavior normal.         Thought Content: Thought content normal.         Judgment: Judgment normal.       Assessment & Plan   Diagnoses and all orders for this visit:    1. Gastroesophageal reflux disease with esophagitis without hemorrhage (Primary)  -     Case Request; Standing  -     Case Request    2. Voice disturbance    3. NSAID long-term use    4. Tobacco abuse counseling    Other orders  -     Implement Anesthesia Orders Day of Procedure; Standing  -     Follow Anesthesia Guidelines / Protocol; Future  -     pantoprazole (PROTONIX) 40 MG EC tablet; Take 1 tablet by mouth Daily.  Dispense: 30 tablet; Refill: 11    I discussed non pharmaceutical treatment of gerd.  This includes gradually losing weight to achieve ideal body wt., elevation of the head of bed by 4-6 inches, nothing to eat or drink 3 hours prior to lying down, avoiding tight clothing, stress reduction, tobacco cessation, reduction of alcohol intake, and dietary restrictions (avoiding caffeine, coffee, fatty foods, mints, chocolate, spicy foods and tomato based sauces as much as possible).  Will start Protonix  Advised her to see ENT if she continue to have voice disturbance and throat clearing.   Increase water intake  Advised to stop vaping.     ESOPHAGOGASTRODUODENOSCOPY WITH ANESTHESIA (N/A)  Part of this note may be an electronic transcription/translation of spoken language to printed text using the Dragon Dictation System.  Body mass index is 31.98 kg/m².  Return if symptoms worsen or fail to improve.           All risks, benefits, alternatives, and indications of colonoscopy and/or Endoscopy procedure have been discussed with the patient. Risks to include perforation of the colon requiring possible  surgery or colostomy, risk of bleeding from biopsies or removal of colon tissue, possibility of missing a colon polyp or cancer, or adverse drug reaction.  Benefits to include the diagnosis and management of disease of the colon and rectum. Alternatives to include barium enema, radiographic evaluation, lab testing or no intervention. Pt verbalizes understanding and agrees.     Lucrecia Liu, APRN  11/7/2024  10:24 CST          If you smoke or use tobacco, 4 minutes reading provided  Steps to Quit Smoking  Smoking tobacco can be harmful to your health and can affect almost every organ in your body. Smoking puts you, and those around you, at risk for developing many serious chronic diseases. Quitting smoking is difficult, but it is one of the best things that you can do for your health. It is never too late to quit.  What are the benefits of quitting smoking?  When you quit smoking, you lower your risk of developing serious diseases and conditions, such as:  Lung cancer or lung disease, such as COPD.  Heart disease.  Stroke.  Heart attack.  Infertility.  Osteoporosis and bone fractures.  Additionally, symptoms such as coughing, wheezing, and shortness of breath may get better when you quit. You may also find that you get sick less often because your body is stronger at fighting off colds and infections. If you are pregnant, quitting smoking can help to reduce your chances of having a baby of low birth weight.  How do I get ready to quit?  When you decide to quit smoking, create a plan to make sure that you are successful. Before you quit:  Pick a date to quit. Set a date within the next two weeks to give you time to prepare.  Write down the reasons why you are quitting. Keep this list in places where you will see it often, such as on your bathroom mirror or in your car or wallet.  Identify the people, places, things, and activities that make you want to smoke (triggers) and avoid them. Make sure to take these  actions:  Throw away all cigarettes at home, at work, and in your car.  Throw away smoking accessories, such as ashtrays and lighters.  Clean your car and make sure to empty the ashtray.  Clean your home, including curtains and carpets.  Tell your family, friends, and coworkers that you are quitting. Support from your loved ones can make quitting easier.  Talk with your health care provider about your options for quitting smoking.  Find out what treatment options are covered by your health insurance.  What strategies can I use to quit smoking?  Talk with your healthcare provider about different strategies to quit smoking. Some strategies include:  Quitting smoking altogether instead of gradually lessening how much you smoke over a period of time. Research shows that quitting “cold turkey” is more successful than gradually quitting.  Attending in-person counseling to help you build problem-solving skills. You are more likely to have success in quitting if you attend several counseling sessions. Even short sessions of 10 minutes can be effective.  Finding resources and support systems that can help you to quit smoking and remain smoke-free after you quit. These resources are most helpful when you use them often. They can include:  Online chats with a counselor.  Telephone quitlines.  Printed self-help materials.  Support groups or group counseling.  Text messaging programs.  Mobile phone applications.  Taking medicines to help you quit smoking. (If you are pregnant or breastfeeding, talk with your health care provider first.) Some medicines contain nicotine and some do not. Both types of medicines help with cravings, but the medicines that include nicotine help to relieve withdrawal symptoms. Your health care provider may recommend:  Nicotine patches, gum, or lozenges.  Nicotine inhalers or sprays.  Non-nicotine medicine that is taken by mouth.  Talk with your health care provider about combining strategies, such as  taking medicines while you are also receiving in-person counseling. Using these two strategies together makes you more likely to succeed in quitting than if you used either strategy on its own.  If you are pregnant or breastfeeding, talk with your health care provider about finding counseling or other support strategies to quit smoking. Do not take medicine to help you quit smoking unless told to do so by your health care provider.  What things can I do to make it easier to quit?  Quitting smoking might feel overwhelming at first, but there is a lot that you can do to make it easier. Take these important actions:  Reach out to your family and friends and ask that they support and encourage you during this time. Call telephone quitlines, reach out to support groups, or work with a counselor for support.  Ask people who smoke to avoid smoking around you.  Avoid places that trigger you to smoke, such as bars, parties, or smoke-break areas at work.  Spend time around people who do not smoke.  Lessen stress in your life, because stress can be a smoking trigger for some people. To lessen stress, try:  Exercising regularly.  Deep-breathing exercises.  Yoga.  Meditating.  Performing a body scan. This involves closing your eyes, scanning your body from head to toe, and noticing which parts of your body are particularly tense. Purposefully relax the muscles in those areas.  Download or purchase mobile phone or tablet apps (applications) that can help you stick to your quit plan by providing reminders, tips, and encouragement. There are many free apps, such as QuitGuide from the CDC (Centers for Disease Control and Prevention). You can find other support for quitting smoking (smoking cessation) through smokefree.gov and other websites.  How will I feel when I quit smoking?  Within the first 24 hours of quitting smoking, you may start to feel some withdrawal symptoms. These symptoms are usually most noticeable 2-3 days after  quitting, but they usually do not last beyond 2-3 weeks. Changes or symptoms that you might experience include:  Mood swings.  Restlessness, anxiety, or irritation.  Difficulty concentrating.  Dizziness.  Strong cravings for sugary foods in addition to nicotine.  Mild weight gain.  Constipation.  Nausea.  Coughing or a sore throat.  Changes in how your medicines work in your body.  A depressed mood.  Difficulty sleeping (insomnia).  After the first 2-3 weeks of quitting, you may start to notice more positive results, such as:  Improved sense of smell and taste.  Decreased coughing and sore throat.  Slower heart rate.  Lower blood pressure.  Clearer skin.  The ability to breathe more easily.  Fewer sick days.  Quitting smoking is very challenging for most people. Do not get discouraged if you are not successful the first time. Some people need to make many attempts to quit before they achieve long-term success. Do your best to stick to your quit plan, and talk with your health care provider if you have any questions or concerns.  This information is not intended to replace advice given to you by your health care provider. Make sure you discuss any questions you have with your health care provider.  Document Released: 12/12/2002 Document Revised: 08/15/2017 Document Reviewed: 05/03/2016  [employer] Interactive Patient Education © 2017 [employer]

## 2024-11-11 NOTE — PROGRESS NOTES
Subjective   Gladys Mcmillan is a 44 y.o. female  YOB: 1980      Chief Complaint   Patient presents with    Follow-up     Patient states she was precribed med for low libido. Patient didn't use that med.   Patient wants to discuss  weight Loss and testosterone.        Follow-upPertinent negatives include no chest pain, no confusion, no dizziness, no nausea, no palpitations, no shortness of breath, no fatigue, no polydipsia, no polyphagia, no polyuria, no weakness, no headaches, no neck pain, no myalgias, no wheezing, no abdominal pain, no choking, no cough, no sore throat, is not nervous/anxious, no cold intolerance, no constipation, no diaphoresis, no diarrhea, no heat intolerance, no menstrual problem, no tremors and no trouble swallowing.       The following portions of the patient's history were reviewed and updated as appropriate: allergies, current medications, past family history, past medical history, past social history, past surgical history, and problem list.    Allergies   Allergen Reactions    Azithromycin Shortness Of Breath and Hives    Flavoring Agent Anaphylaxis    Strawberry Anaphylaxis    Butorphanol Hallucinations       Past Medical History:   Diagnosis Date    ADD (attention deficit disorder)     Anxiety     Fibromyalgia, primary     Possibly?    GERD (gastroesophageal reflux disease)     Headache     HL (hearing loss)     Hypertension [id number]    Hypertension last spring    Irritable bowel syndrome     Kidney stone     Just once.    Low back pain     Neuromuscular disorder     Tingling and cramping daily    Osteopenia     Urinary tract infection     UTI a couple times in the past    Visual impairment     Vitamin B 12 deficiency        Family History   Problem Relation Age of Onset    Thyroid disease Mother     Hyperlipidemia Mother     Miscarriages / Stillbirths Mother     Anxiety disorder Father     Arthritis Father         Seronegative Rheumatoid Athritis    No Known  Problems Brother     Breast cancer Maternal Grandmother 60        Had right breast removed     Diabetes Maternal Grandmother             Arthritis Maternal Grandmother             Cancer Maternal Grandmother         Breast/Lukemia    Depression Maternal Grandmother             Hearing loss Maternal Grandmother             Heart disease Maternal Grandmother             Colon cancer Maternal Grandfather 60    Cancer Maternal Grandfather         Colon    COPD Maternal Grandfather             Hearing loss Maternal Grandfather             Hyperlipidemia Maternal Grandfather             Colon cancer Paternal Grandmother     Skin cancer Paternal Grandmother     Colon polyps Paternal Grandmother     Cancer Paternal Grandmother         Colon    Diabetes Paternal Grandfather 60            Cancer Paternal Grandfather 60    Hearing loss Maternal Aunt         Before 40    Ovarian cancer Neg Hx     Uterine cancer Neg Hx     Melanoma Neg Hx        Social History     Socioeconomic History    Marital status:    Tobacco Use    Smoking status: Every Day     Current packs/day: 0.50     Average packs/day: 0.5 packs/day for 24.5 years (12.3 ttl pk-yrs)     Types: Electronic Cigarette, Cigarettes     Start date: 2000    Smokeless tobacco: Current    Tobacco comments:     Vapes   Vaping Use    Vaping status: Every Day    Substances: Nicotine    Devices: Disposable   Substance and Sexual Activity    Alcohol use: No    Drug use: No    Sexual activity: Yes     Partners: Male     Birth control/protection: Vasectomy, Hysterectomy     Comment: partner had vasectomy          Current Outpatient Medications:     amoxicillin-clavulanate (AUGMENTIN) 875-125 MG per tablet, Take 1 tablet by mouth 2 (Two) Times a Day., Disp: 20 tablet, Rfl: 0    AMPHETAMINE-DEXTROAMPHETAMINE PO, Take 40 mg by mouth 2 (Two) Times a Day., Disp: , Rfl:     busPIRone (BUSPAR) 15 MG tablet,  Take 1 tablet by mouth 3 (Three) Times a Day. (Patient not taking: Reported on 11/7/2024), Disp: 90 tablet, Rfl: 1    EPINEPHrine (EPIPEN) 0.3 MG/0.3ML solution auto-injector injection, , Disp: , Rfl:     FLUoxetine (PROzac) 40 MG capsule, Take 1 capsule by mouth Daily., Disp: , Rfl:     fluticasone (FLONASE) 50 MCG/ACT nasal spray, Administer 2 sprays into the nostril(s) as directed by provider Daily., Disp: 15.8 mL, Rfl: 5    levocetirizine (XYZAL) 5 MG tablet, Take 1 tablet by mouth Every Evening., Disp: 90 tablet, Rfl: 1    pregabalin (LYRICA) 75 MG capsule, Take 2 capsules by mouth 2 (Two) Times a Day. (Patient taking differently: Take 1 capsule by mouth 4 (Four) Times a Day.), Disp: 120 capsule, Rfl: 3    tiZANidine (ZANAFLEX) 4 MG tablet, TAKE 1/4-1 TABLET BY MOUTH EVERY 6 TO 8 HOURS, Disp: , Rfl:     pantoprazole (PROTONIX) 40 MG EC tablet, Take 1 tablet by mouth Daily., Disp: 30 tablet, Rfl: 11    Semaglutide-Weight Management (Wegovy) 0.25 MG/0.5ML solution auto-injector, Inject 0.5 mL under the skin into the appropriate area as directed 1 (One) Time Per Week. (Patient not taking: Reported on 11/7/2024), Disp: 2 mL, Rfl: 0    Patient's last menstrual period was 11/01/2023.    Sexual History:         Could not be calculated    Past Surgical History:   Procedure Laterality Date    BREAST BIOPSY Right     benign    CHOLECYSTECTOMY      COLONOSCOPY  03/18/2016    Hemorrhoids otherwise normal exam repeat in 10 years    COLONOSCOPY N/A 05/21/2021    Dr. asrgent- One 6 mm hyperplastic  polyp at 70 cm proximal to the anus, - One 3 mm polyp at 40 cm proximal to the anus,Superficial fragment of benign colonic mucosa demonstrating mild glandular hyperplastic changes-    FRACTURE SURGERY      HIP FRACTURE SURGERY Left     HYSTERECTOMY      TVH/    TONSILLECTOMY AND ADENOIDECTOMY         Review of Systems   Constitutional:  Negative for activity change, appetite change, chills, diaphoresis, fatigue, fever and  unexpected weight change.   HENT:  Negative for congestion, dental problem, drooling, ear discharge, ear pain, facial swelling, hearing loss, mouth sores, nosebleeds, postnasal drip, rhinorrhea, sinus pressure, sinus pain, sneezing, sore throat, tinnitus, trouble swallowing and voice change.    Eyes:  Negative for photophobia, pain, discharge, redness, itching and visual disturbance.   Respiratory:  Negative for apnea, cough, choking, chest tightness, shortness of breath, wheezing and stridor.    Cardiovascular:  Negative for chest pain, palpitations and leg swelling.   Gastrointestinal:  Negative for abdominal distention, abdominal pain, anal bleeding, blood in stool, constipation, diarrhea, nausea, rectal pain and vomiting.   Endocrine: Negative for cold intolerance, heat intolerance, polydipsia, polyphagia and polyuria.   Genitourinary:  Negative for decreased urine volume, difficulty urinating, dyspareunia, dysuria, enuresis, flank pain, frequency, genital sores, hematuria, menstrual problem, pelvic pain, urgency, vaginal bleeding, vaginal discharge and vaginal pain.   Musculoskeletal:  Negative for arthralgias, back pain, gait problem, joint swelling, myalgias, neck pain and neck stiffness.   Skin:  Negative for color change, pallor, rash and wound.   Allergic/Immunologic: Negative for environmental allergies, food allergies and immunocompromised state.   Neurological:  Negative for dizziness, tremors, seizures, syncope, facial asymmetry, speech difficulty, weakness, light-headedness, numbness and headaches.   Hematological:  Negative for adenopathy. Does not bruise/bleed easily.   Psychiatric/Behavioral:  Negative for agitation, behavioral problems, confusion, decreased concentration, dysphoric mood, hallucinations, self-injury, sleep disturbance and suicidal ideas. The patient is not nervous/anxious and is not hyperactive.        Objective   Physical Exam      Vitals:    11/04/24 1541   BP: 116/78   Weight:  "85.7 kg (189 lb)   Height: 165.1 cm (65\")       Diagnoses and all orders for this visit:    1. Class 1 obesity without serious comorbidity with body mass index (BMI) of 31.0 to 31.9 in adult, unspecified obesity type (Primary)  Comments:  Patient wants to discuss weight loss aids.  Discussed options and risk versus benefits.  Rx for Wegovy 0.25 mg sent to pharmacy and patient instructed in use.  Orders:  -     Semaglutide-Weight Management (Wegovy) 0.25 MG/0.5ML solution auto-injector; Inject 0.5 mL under the skin into the appropriate area as directed 1 (One) Time Per Week. (Patient not taking: Reported on 11/7/2024)  Dispense: 2 mL; Refill: 0    2. Low testosterone  Comments:  Patient reports she did not start Vyleesi.  Wants to discuss weight loss options first.    3. Mixed incontinence  Comments:  Patient reports mixed incontinence.  Discussed treatment options and risk versus benefits.  To further discuss at upcoming visit.                        Non-Smoker    MyChart Instructions Given       "

## 2024-11-12 PROBLEM — K21.00 GASTROESOPHAGEAL REFLUX DISEASE WITH ESOPHAGITIS WITHOUT HEMORRHAGE: Status: ACTIVE | Noted: 2024-11-07

## 2024-11-18 NOTE — PROGRESS NOTES
Orthopaedic Clinic Note    NAME:  Miesha Liriano   : 1980  MRN: [id number]      2024     CHIEF COMPLAINT:  Right knee pain    HISTORY OF PRESENT ILLNESS:   Miesha is a 44 y.o. female who presents to the office for evaluation and treatment of the right knee.   On 2024, the patient had a mechanical fall where she tripped at work.  Patient does work for [email or web address] as an .  She notes the pain has been gradually improving over the course of the last 11 days.  She denies any swelling.  She does note she has some pain when she crosses her legs but otherwise denies any decreased range of motion.  She denies any instability or difficulty bearing weight.  She has been taking over-the-counter anti-inflammatories for pain.      Past Medical History:        Diagnosis Date    ADHD (attention deficit hyperactivity disorder)        Past Surgical History:        Procedure Laterality Date    CHOLECYSTECTOMY      HYSTERECTOMY, TOTAL ABDOMINAL (CERVIX REMOVED)  2023    TONSILLECTOMY         Current Medications:   Prior to Admission medications    Medication Sig Start Date End Date Taking? Authorizing Provider   FLUoxetine (PROZAC) 40 MG capsule Take 1 capsule by mouth daily   Yes Spring Mcfarland MD   fluticasone (FLONASE) 50 MCG/ACT nasal spray 2 sprays by Nasal route daily 10/28/24  Yes Spring Mcfarland MD   levocetirizine (XYZAL) 5 MG tablet Take 1 tablet by mouth every evening 24  Yes Spring Mcfarland MD   pantoprazole (PROTONIX) 40 MG tablet Take 1 tablet by mouth daily 24  Yes Spring Mcfarland MD   clonazePAM (KLONOPIN) 0.5 MG tablet Take 1 tablet by mouth 2 times daily as needed. 24  Yes Spring Mcfarland MD   pregabalin (LYRICA) 75 MG capsule Take 1 capsule by mouth 2 times daily. 24  Yes Spring Mcfarland MD   amphetamine-dextroamphetamine (ADDERALL) 20 MG tablet Take 1 tablet by mouth 2 times daily. 22  Yes

## 2024-11-19 ENCOUNTER — OFFICE VISIT (OUTPATIENT)
Age: 44
End: 2024-11-19

## 2024-11-19 VITALS — BODY MASS INDEX: 33.32 KG/M2 | HEIGHT: 65 IN | WEIGHT: 200 LBS

## 2024-11-19 DIAGNOSIS — M25.561 PAIN IN JOINT OF RIGHT KNEE: Primary | ICD-10-CM

## 2024-11-19 DIAGNOSIS — S86.911A KNEE STRAIN, RIGHT, INITIAL ENCOUNTER: ICD-10-CM

## 2024-11-19 RX ORDER — PANTOPRAZOLE SODIUM 40 MG/1
40 TABLET, DELAYED RELEASE ORAL DAILY
COMMUNITY
Start: 2024-11-07

## 2024-11-19 RX ORDER — FLUTICASONE PROPIONATE 50 MCG
2 SPRAY, SUSPENSION (ML) NASAL DAILY
COMMUNITY
Start: 2024-10-28

## 2024-11-19 RX ORDER — SEMAGLUTIDE 1 MG/.5ML
INJECTION, SOLUTION SUBCUTANEOUS
COMMUNITY

## 2024-11-19 RX ORDER — FLUOXETINE 40 MG/1
40 CAPSULE ORAL DAILY
COMMUNITY

## 2024-11-19 RX ORDER — LEVOCETIRIZINE DIHYDROCHLORIDE 5 MG/1
5 TABLET, FILM COATED ORAL EVERY EVENING
COMMUNITY
Start: 2024-04-23

## 2024-11-24 ENCOUNTER — APPOINTMENT (OUTPATIENT)
Dept: GENERAL RADIOLOGY | Facility: HOSPITAL | Age: 44
End: 2024-11-24
Payer: COMMERCIAL

## 2024-11-24 ENCOUNTER — HOSPITAL ENCOUNTER (EMERGENCY)
Facility: HOSPITAL | Age: 44
Discharge: HOME | End: 2024-11-24
Admitting: INTERNAL MEDICINE
Payer: COMMERCIAL

## 2024-11-24 ENCOUNTER — APPOINTMENT (OUTPATIENT)
Dept: CT IMAGING | Facility: HOSPITAL | Age: 44
End: 2024-11-24
Payer: COMMERCIAL

## 2024-11-24 VITALS
BODY MASS INDEX: 32.03 KG/M2 | TEMPERATURE: 98.8 F | RESPIRATION RATE: 18 BRPM | WEIGHT: 192.24 LBS | SYSTOLIC BLOOD PRESSURE: 110 MMHG | HEIGHT: 65 IN | HEART RATE: 75 BPM | DIASTOLIC BLOOD PRESSURE: 89 MMHG | OXYGEN SATURATION: 94 %

## 2024-11-24 DIAGNOSIS — R07.9 CHEST PAIN, UNSPECIFIED TYPE: ICD-10-CM

## 2024-11-24 DIAGNOSIS — R05.1 ACUTE COUGH: ICD-10-CM

## 2024-11-24 DIAGNOSIS — R09.A2 GLOBUS SENSATION: ICD-10-CM

## 2024-11-24 DIAGNOSIS — F41.9 ANXIETY: Primary | ICD-10-CM

## 2024-11-24 LAB
ALBUMIN SERPL-MCNC: 4.2 G/DL (ref 3.5–5.2)
ALBUMIN/GLOB SERPL: 1.4 G/DL
ALP SERPL-CCNC: 68 U/L (ref 39–117)
ALT SERPL W P-5'-P-CCNC: 19 U/L (ref 1–33)
AMPHET+METHAMPHET UR QL: POSITIVE
AMPHETAMINES UR QL: NEGATIVE
ANION GAP SERPL CALCULATED.3IONS-SCNC: 15 MMOL/L (ref 5–15)
AST SERPL-CCNC: 19 U/L (ref 1–32)
BARBITURATES UR QL SCN: NEGATIVE
BASOPHILS # BLD AUTO: 0.03 10*3/MM3 (ref 0–0.2)
BASOPHILS NFR BLD AUTO: 0.6 % (ref 0–1.5)
BENZODIAZ UR QL SCN: POSITIVE
BILIRUB SERPL-MCNC: 0.4 MG/DL (ref 0–1.2)
BUN SERPL-MCNC: 16 MG/DL (ref 6–20)
BUN/CREAT SERPL: 21.3 (ref 7–25)
BUPRENORPHINE SERPL-MCNC: NEGATIVE NG/ML
CALCIUM SPEC-SCNC: 9.7 MG/DL (ref 8.6–10.5)
CANNABINOIDS SERPL QL: NEGATIVE
CHLORIDE SERPL-SCNC: 101 MMOL/L (ref 98–107)
CO2 SERPL-SCNC: 21 MMOL/L (ref 22–29)
COCAINE UR QL: NEGATIVE
CREAT SERPL-MCNC: 0.75 MG/DL (ref 0.57–1)
D DIMER PPP FEU-MCNC: 0.57 MCGFEU/ML (ref 0–0.5)
DEPRECATED RDW RBC AUTO: 37.8 FL (ref 37–54)
EGFRCR SERPLBLD CKD-EPI 2021: 101.5 ML/MIN/1.73
EOSINOPHIL # BLD AUTO: 0.15 10*3/MM3 (ref 0–0.4)
EOSINOPHIL NFR BLD AUTO: 2.8 % (ref 0.3–6.2)
ERYTHROCYTE [DISTWIDTH] IN BLOOD BY AUTOMATED COUNT: 12.2 % (ref 12.3–15.4)
FENTANYL UR-MCNC: NEGATIVE NG/ML
GEN 5 2HR TROPONIN T REFLEX: <6 NG/L
GLOBULIN UR ELPH-MCNC: 2.9 GM/DL
GLUCOSE SERPL-MCNC: 92 MG/DL (ref 65–99)
HCT VFR BLD AUTO: 37.8 % (ref 34–46.6)
HGB BLD-MCNC: 13.1 G/DL (ref 12–15.9)
IMM GRANULOCYTES # BLD AUTO: 0.01 10*3/MM3 (ref 0–0.05)
IMM GRANULOCYTES NFR BLD AUTO: 0.2 % (ref 0–0.5)
LYMPHOCYTES # BLD AUTO: 1.5 10*3/MM3 (ref 0.7–3.1)
LYMPHOCYTES NFR BLD AUTO: 27.5 % (ref 19.6–45.3)
MCH RBC QN AUTO: 29.6 PG (ref 26.6–33)
MCHC RBC AUTO-ENTMCNC: 34.7 G/DL (ref 31.5–35.7)
MCV RBC AUTO: 85.3 FL (ref 79–97)
METHADONE UR QL SCN: NEGATIVE
MONOCYTES # BLD AUTO: 0.42 10*3/MM3 (ref 0.1–0.9)
MONOCYTES NFR BLD AUTO: 7.7 % (ref 5–12)
NEUTROPHILS NFR BLD AUTO: 3.34 10*3/MM3 (ref 1.7–7)
NEUTROPHILS NFR BLD AUTO: 61.2 % (ref 42.7–76)
NRBC BLD AUTO-RTO: 0 /100 WBC (ref 0–0.2)
NT-PROBNP SERPL-MCNC: <36 PG/ML (ref 0–450)
OPIATES UR QL: NEGATIVE
OXYCODONE UR QL SCN: NEGATIVE
PCP UR QL SCN: NEGATIVE
PLATELET # BLD AUTO: 307 10*3/MM3 (ref 140–450)
PMV BLD AUTO: 9.4 FL (ref 6–12)
POTASSIUM SERPL-SCNC: 3.9 MMOL/L (ref 3.5–5.2)
PROT SERPL-MCNC: 7.1 G/DL (ref 6–8.5)
RBC # BLD AUTO: 4.43 10*6/MM3 (ref 3.77–5.28)
SODIUM SERPL-SCNC: 137 MMOL/L (ref 136–145)
TRICYCLICS UR QL SCN: NEGATIVE
TROPONIN T DELTA: NORMAL
TROPONIN T SERPL HS-MCNC: <6 NG/L
WBC NRBC COR # BLD AUTO: 5.45 10*3/MM3 (ref 3.4–10.8)

## 2024-11-24 PROCEDURE — 71275 CT ANGIOGRAPHY CHEST: CPT

## 2024-11-24 PROCEDURE — 25010000002 LORAZEPAM PER 2 MG: Performed by: INTERNAL MEDICINE

## 2024-11-24 PROCEDURE — 84484 ASSAY OF TROPONIN QUANT: CPT

## 2024-11-24 PROCEDURE — 85379 FIBRIN DEGRADATION QUANT: CPT

## 2024-11-24 PROCEDURE — 96374 THER/PROPH/DIAG INJ IV PUSH: CPT

## 2024-11-24 PROCEDURE — 99285 EMERGENCY DEPT VISIT HI MDM: CPT

## 2024-11-24 PROCEDURE — 93005 ELECTROCARDIOGRAM TRACING: CPT

## 2024-11-24 PROCEDURE — 80307 DRUG TEST PRSMV CHEM ANLYZR: CPT

## 2024-11-24 PROCEDURE — 85025 COMPLETE CBC W/AUTO DIFF WBC: CPT

## 2024-11-24 PROCEDURE — 36415 COLL VENOUS BLD VENIPUNCTURE: CPT

## 2024-11-24 PROCEDURE — 71045 X-RAY EXAM CHEST 1 VIEW: CPT

## 2024-11-24 PROCEDURE — 80053 COMPREHEN METABOLIC PANEL: CPT

## 2024-11-24 PROCEDURE — 25510000001 IOPAMIDOL PER 1 ML

## 2024-11-24 PROCEDURE — 83880 ASSAY OF NATRIURETIC PEPTIDE: CPT

## 2024-11-24 RX ORDER — SODIUM CHLORIDE 0.9 % (FLUSH) 0.9 %
10 SYRINGE (ML) INJECTION AS NEEDED
Status: DISCONTINUED | OUTPATIENT
Start: 2024-11-24 | End: 2024-11-24 | Stop reason: HOSPADM

## 2024-11-24 RX ORDER — LORAZEPAM 2 MG/ML
1 INJECTION INTRAMUSCULAR ONCE
Status: COMPLETED | OUTPATIENT
Start: 2024-11-24 | End: 2024-11-24

## 2024-11-24 RX ORDER — ALUMINA, MAGNESIA, AND SIMETHICONE 2400; 2400; 240 MG/30ML; MG/30ML; MG/30ML
15 SUSPENSION ORAL ONCE
Status: COMPLETED | OUTPATIENT
Start: 2024-11-24 | End: 2024-11-24

## 2024-11-24 RX ORDER — IOPAMIDOL 755 MG/ML
100 INJECTION, SOLUTION INTRAVASCULAR
Status: COMPLETED | OUTPATIENT
Start: 2024-11-24 | End: 2024-11-24

## 2024-11-24 RX ORDER — LIDOCAINE HYDROCHLORIDE 20 MG/ML
5 SOLUTION OROPHARYNGEAL ONCE
Status: COMPLETED | OUTPATIENT
Start: 2024-11-24 | End: 2024-11-24

## 2024-11-24 RX ADMIN — LIDOCAINE HYDROCHLORIDE 5 ML: 20 SOLUTION ORAL at 14:16

## 2024-11-24 RX ADMIN — ALUMINUM HYDROXIDE, MAGNESIUM HYDROXIDE, AND DIMETHICONE 15 ML: 400; 400; 40 SUSPENSION ORAL at 14:16

## 2024-11-24 RX ADMIN — LORAZEPAM 1 MG: 2 INJECTION INTRAMUSCULAR; INTRAVENOUS at 12:02

## 2024-11-24 RX ADMIN — IOPAMIDOL 73 ML: 755 INJECTION, SOLUTION INTRAVENOUS at 13:09

## 2024-11-24 NOTE — ED PROVIDER NOTES
Subjective   History of Present Illness  Patient is a 44-year-old female who presents emergency department with complaints of cough and constant throat clearing.  Patient reports that this started last night.  She states that she feels like there is so much phlegm in her throat that she cannot clear it.  Patient reports that she is able to swallow without any difficulty.  Patient actually has all of her symptoms written down in the note section on her phone because she does not want to speak due to her constantly clearing her throat.  Patient reports that last night she took 3 hydroxyzine's in hopes that this would help her throat clearing, but it did not.  She reports that she feels very anxious at this time.  She also reports that she is having midsternal chest pain that developed last night.  Feels short of breath.  Patient states that this has been ongoing and has been treated for GERD.  She does have an appointment coming up with GI for an endoscopy on December 3.  Patient is actively continuously clearing her throat very loudly and appears very anxious.        Review of Systems   HENT:  Positive for congestion.    Respiratory:  Positive for cough and shortness of breath.    Cardiovascular:  Positive for chest pain.   Psychiatric/Behavioral:  The patient is nervous/anxious.    All other systems reviewed and are negative.      Past Medical History:   Diagnosis Date    ADD (attention deficit disorder)     Anxiety     Fibromyalgia, primary     Possibly?    GERD (gastroesophageal reflux disease)     Headache     HL (hearing loss)     Hypertension [id number]    Hypertension last spring    Irritable bowel syndrome     Kidney stone     Just once.    Low back pain     Neuromuscular disorder     Tingling and cramping daily    Osteopenia     Urinary tract infection     UTI a couple times in the past    Visual impairment     Vitamin B 12 deficiency        Allergies   Allergen Reactions    Azithromycin Shortness Of Breath  and Hives    Flavoring Agent Anaphylaxis    Strawberry Anaphylaxis    Butorphanol Hallucinations       Past Surgical History:   Procedure Laterality Date    BREAST BIOPSY Right     benign    CHOLECYSTECTOMY      COLONOSCOPY  2016    Hemorrhoids otherwise normal exam repeat in 10 years    COLONOSCOPY N/A 2021    Dr. sargent- One 6 mm hyperplastic  polyp at 70 cm proximal to the anus, - One 3 mm polyp at 40 cm proximal to the anus,Superficial fragment of benign colonic mucosa demonstrating mild glandular hyperplastic changes-    FRACTURE SURGERY      HIP FRACTURE SURGERY Left     HYSTERECTOMY      TVH/    TONSILLECTOMY AND ADENOIDECTOMY         Family History   Problem Relation Age of Onset    Thyroid disease Mother     Hyperlipidemia Mother     Miscarriages / Stillbirths Mother     Anxiety disorder Father     Arthritis Father         Seronegative Rheumatoid Athritis    No Known Problems Brother     Breast cancer Maternal Grandmother 60        Had right breast removed     Diabetes Maternal Grandmother             Arthritis Maternal Grandmother             Cancer Maternal Grandmother         Breast/Lukemia    Depression Maternal Grandmother             Hearing loss Maternal Grandmother             Heart disease Maternal Grandmother             Colon cancer Maternal Grandfather 60    Cancer Maternal Grandfather         Colon    COPD Maternal Grandfather             Hearing loss Maternal Grandfather             Hyperlipidemia Maternal Grandfather             Colon cancer Paternal Grandmother     Skin cancer Paternal Grandmother     Colon polyps Paternal Grandmother     Cancer Paternal Grandmother         Colon    Diabetes Paternal Grandfather 60            Cancer Paternal Grandfather 60    Hearing loss Maternal Aunt         Before 40    Ovarian cancer Neg Hx     Uterine cancer Neg Hx     Melanoma Neg Hx        Social History      Socioeconomic History    Marital status:    Tobacco Use    Smoking status: Every Day     Current packs/day: 0.50     Average packs/day: 0.5 packs/day for 24.6 years (12.3 ttl pk-yrs)     Types: Electronic Cigarette, Cigarettes     Start date: 5/4/2000    Smokeless tobacco: Current    Tobacco comments:     Vapes   Vaping Use    Vaping status: Every Day    Substances: Nicotine    Devices: Disposable   Substance and Sexual Activity    Alcohol use: No    Drug use: No    Sexual activity: Yes     Partners: Male     Birth control/protection: Vasectomy, Hysterectomy     Comment: partner had vasectomy            Objective   Physical Exam  Vitals and nursing note reviewed.   Constitutional:       Appearance: Normal appearance. She is normal weight. She is not ill-appearing or toxic-appearing.      Comments: Continuously clearing throat on exam   HENT:      Head: Normocephalic.      Nose: Nose normal.   Cardiovascular:      Rate and Rhythm: Normal rate and regular rhythm.      Pulses: Normal pulses.      Heart sounds: Normal heart sounds.   Pulmonary:      Effort: Pulmonary effort is normal.      Breath sounds: Normal breath sounds.   Abdominal:      General: Abdomen is flat. Bowel sounds are normal. There is no distension.      Palpations: Abdomen is soft.      Tenderness: There is no abdominal tenderness.   Musculoskeletal:         General: Normal range of motion.      Cervical back: Normal range of motion and neck supple.   Skin:     General: Skin is warm and dry.   Neurological:      General: No focal deficit present.      Mental Status: She is alert and oriented to person, place, and time. Mental status is at baseline.   Psychiatric:         Mood and Affect: Mood normal.         Behavior: Behavior normal.         Thought Content: Thought content normal.         Judgment: Judgment normal.         Procedures       Labs Reviewed   COMPREHENSIVE METABOLIC PANEL - Abnormal; Notable for the following components:       " Result Value    CO2 21.0 (*)     All other components within normal limits    Narrative:     GFR Normal >60  Chronic Kidney Disease <60  Kidney Failure <15     D-DIMER, QUANTITATIVE - Abnormal; Notable for the following components:    D-Dimer, Quantitative 0.57 (*)     All other components within normal limits    Narrative:     According to the assay 's published package insert, a normal (<0.50 MCGFEU/mL) D-dimer result in conjunction with a non-high clinical probability assessment, excludes deep vein thrombosis (DVT) and pulmonary embolism (PE) with high sensitivity.    D-dimer values increase with age and this can make VTE exclusion of an older population difficult. To address this, the [employer], based on best available evidence and recent guidelines, recommends that clinicians use age-adjusted D-dimer thresholds in patients greater than 50 years of age with: a) a low probability of PE who do not meet all Pulmonary Embolism Rule Out Criteria, or b) in those with intermediate probability of PE.   The formula for an age-adjusted D-dimer cut-off is \"age/100\".  For example, a 60 year old patient would have an age-adjusted cut-off of 0.60 MCGFEU/mL and an 80 year old 0.80 MCGFEU/mL.   CBC WITH AUTO DIFFERENTIAL - Abnormal; Notable for the following components:    RDW 12.2 (*)     All other components within normal limits   URINE DRUG SCREEN - Abnormal; Notable for the following components:    Amphetamine Screen, Urine Positive (*)     Benzodiazepine Screen, Urine Positive (*)     All other components within normal limits    Narrative:     Cutoff For Drugs Screened:    Amphetamines               500 ng/ml  Barbiturates               200 ng/ml  Benzodiazepines            150 ng/ml  Cocaine                    150 ng/ml  Methadone                  200 ng/ml  Opiates                    100 ng/ml  Phencyclidine               25 ng/ml  THC                         50 ng/ml  Methamphetamine    "         500 ng/ml  Tricyclic Antidepressants  300 ng/ml  Oxycodone                  100 ng/ml  Buprenorphine               10 ng/ml    The normal value for all drugs tested is negative. This report includes unconfirmed screening results, with the cutoff values listed, to be used for medical treatment purposes only.  Unconfirmed results must not be used for non-medical purposes such as employment or legal testing.  Clinical consideration should be applied to any drug of abuse test, particularly when unconfirmed results are used.     BNP (IN-HOUSE) - Normal    Narrative:     This assay is used as an aid in the diagnosis of individuals suspected of having heart failure. It can be used as an aid in the diagnosis of acute decompensated heart failure (ADHF) in patients presenting with signs and symptoms of ADHF to the emergency department (ED). In addition, NT-proBNP of <300 pg/mL indicates ADHF is not likely.    Age Range Result Interpretation  NT-proBNP Concentration (pg/mL:      <50             Positive            >450                   Gray                 300-450                    Negative             <300    50-75           Positive            >900                  Gray                300-900                  Negative            <300      >75             Positive            >1800                  Gray                300-1800                  Negative            <300   TROPONIN - Normal    Narrative:     High Sensitive Troponin T Reference Range:  <14.0 ng/L- Negative Female for AMI  <22.0 ng/L- Negative Male for AMI  >=14 - Abnormal Female indicating possible myocardial injury.  >=22 - Abnormal Male indicating possible myocardial injury.   Clinicians would have to utilize clinical acumen, EKG, Troponin, and serial changes to determine if it is an Acute Myocardial Infarction or myocardial injury due to an underlying chronic condition.        FENTANYL, URINE - Normal    Narrative:     Negative Threshold:       Fentanyl 5 ng/mL     The normal value for the drug tested is negative. This report includes final unconfirmed screening results to be used for medical treatment purposes only. Unconfirmed results must not be used for non-medical purposes such as employment or legal testing. Clinical consideration should be applied to any drug of abuse test, particularly when unconfirmed results are used.          HIGH SENSITIVITIY TROPONIN T 2HR    Narrative:     High Sensitive Troponin T Reference Range:  <14.0 ng/L- Negative Female for AMI  <22.0 ng/L- Negative Male for AMI  >=14 - Abnormal Female indicating possible myocardial injury.  >=22 - Abnormal Male indicating possible myocardial injury.   Clinicians would have to utilize clinical acumen, EKG, Troponin, and serial changes to determine if it is an Acute Myocardial Infarction or myocardial injury due to an underlying chronic condition.        CBC AND DIFFERENTIAL    Narrative:     The following orders were created for panel order CBC & Differential.  Procedure                               Abnormality         Status                     ---------                               -----------         ------                     CBC Auto Differential[[id number]]        Abnormal            Final result                 Please view results for these tests on the individual orders.     CT Angiogram Chest   Final Result       1. No pulmonary emboli.   2. No thoracic aortic aneurysm or dissection.   3. Dependent groundglass opacities favoring atelectasis over   pneumonitis. No consolidation or suspicious nodularity.   4. Very small hiatal hernia..       This report was signed and finalized on 11/24/2024 1:31 PM by Dr. Lucrecia Wick MD.          XR Chest 1 View   Final Result   Impression:   1. No acute findings.           This report was signed and finalized on 11/24/2024 12:26 PM by Dr. Lucrecia Wick MD.                  ED Course  ED Course as of 11/24/24 1853   Sun Nov 24,  2024   1141 nsr [TS]   1450 HEART Score for Major Cardiac Events - MDCalc  Calculated on Nov 24 2024 3:50 PM  1 points -> Low Score (0-3 points) Risk of MACE of 0.9-1.7%. [KR]      ED Course User Index  [KR] Tori Bean APRN  [TS] Ramon Ordonez MD                                                       Medical Decision Making  Patient is a 44-year-old female who presents emergency department with complaints of cough and constant throat clearing.  Patient reports that this started last night.  She states that she feels like there is so much phlegm in her throat that she cannot clear it.  Patient reports that she is able to swallow without any difficulty.  Patient actually has all of her symptoms written down in the note section on her phone because she does not want to speak due to her constantly clearing her throat.  Patient reports that last night she took 3 hydroxyzine's in hopes that this would help her throat clearing, but it did not.  She reports that she feels very anxious at this time.  She also reports that she is having midsternal chest pain that developed last night.  Feels short of breath.  Patient states that this has been ongoing and has been treated for GERD.  She does have an appointment coming up with GI for an endoscopy on December 3.  Patient is actively continuously clearing her throat very loudly and appears very anxious.    Patient was non-toxic appearing on arrival. Vital signs stable.  On initial evaluation, patient was constantly clearing her throat very loudly.  She stated that she was anxious.    Patient's presentation raises suspicion for differentials including, but not limited to, anxiety, globus sensation, ACS, GERD.     External (non-ED) record review: None    Given this, patient was placed on the monitor. Laboratory studies and imaging studies were ordered including CBC, CMP, troponin, BNP, D-dimer, magnesium, EKG, chest x-ray, CT angiogram chest, urine drug screen.     Patient was  given IV Ativan, GI cocktail for symptomatic relief.    Imaging was reviewed by radiologist. Please refer to above section for results that were interpreted by radiologist.    Decision rules/scores evaluated: Heart score 1.  Troponin x 2 unremarkable.  EKG with no acute findings.  Low suspicion for ACS.    Labs were reviewed and interpreted. Please refer to above section for results.    On re-evaluation, patient remained hemodynamically stable and appeared to be comfortable and in no acute distress.  Throat clearing had improved.  Do feel that this is somewhat related to anxiety.  Have informed her to follow-up with her PCP regarding anxiety medications and plan.  Patient was able to swallow without any difficulty.  She does have an upcoming endoscopy on an outpatient basis.  Advised her to keep this appointment.    I discussed all of the lab and imaging results with the patient during this visit in the emergency department. I answered all the questions regarding the emergency department evaluation, diagnosis, and treatment plan. We talked about how crucial it is for the patient to follow up by calling their primary care provider as soon as possible to schedule an appointment for within the next few days or as soon as possible so that the symptoms can be reassessed to see if they have improved or to answer any additional questions. I also provided the patient with advice on returning safely and urged the patient to visit the emergency department right away if any worsening or new symptoms appeared. The patient verbalized understanding of the discharge instructions and agreed with them. Gladys was discharged in stable condition.    Signed by:   MARTHA Stallings 11/24/2024 18:50 CST     Dragon disclaimer:  Part of this note may be an electronic transcription/translation of spoken language to printed text using the Dragon Dictation System.    Problems Addressed:  Acute cough: acute illness or injury  Anxiety: acute  illness or injury  Chest pain, unspecified type: acute illness or injury  Globus sensation: acute illness or injury    Amount and/or Complexity of Data Reviewed  Labs: ordered.  Radiology: ordered.    Risk  OTC drugs.  Prescription drug management.        Final diagnoses:   Anxiety   Globus sensation   Acute cough   Chest pain, unspecified type       ED Disposition  ED Disposition       ED Disposition   Discharge    Condition   Stable    Comment   --               KEMAL Mackay MD  [address]  [address]  [facility] [id number] [phone]    Schedule an appointment as soon as possible for a visit in 1 day      [facility] EMERGENCY DEPARTMENT  [address] [address] [phone]  Go to   If symptoms worsen         Medication List        Changed      pregabalin 75 MG capsule  Commonly known as: LYRICA  Take 2 capsules by mouth 2 (Two) Times a Day.  What changed:   how much to take  when to take this                 Tori Bean, APRN  11/24/24 [id number]

## 2024-11-24 NOTE — DISCHARGE INSTRUCTIONS
It was very nice to meet you, Gladys. Thank you for allowing us to take care of you today at [facility].    Please understand that an ER evaluation is just the start of your evaluation. We will do what we can, but we are often unable to fully figure out what is causing your symptoms from one evaluation. Thus, our primary goal is to determine whether you need to be evaluated in the hospital or if it is safe for you to go home and see other doctors such as a primary care physician or a specialist on an outpatient basis.     Like we discussed, it is VERY IMPORTANT that you follow up with your primary care doctor (call them to set up an appointment) within the next few days or as soon as possible so that you can be re-evaluated for improvement in your symptoms or for any other questions.     Please return to the emergency room within 12-48 hours if you experience any new or worsening symptoms.

## 2024-11-25 ENCOUNTER — TELEPHONE (OUTPATIENT)
Dept: GASTROENTEROLOGY | Facility: CLINIC | Age: 44
End: 2024-11-25
Payer: COMMERCIAL

## 2024-11-25 LAB
QT INTERVAL: 360 MS
QTC INTERVAL: 454 MS

## 2024-11-27 ENCOUNTER — OFFICE VISIT (OUTPATIENT)
Dept: INTERNAL MEDICINE | Facility: CLINIC | Age: 44
End: 2024-11-27
Payer: COMMERCIAL

## 2024-11-27 VITALS
HEART RATE: 105 BPM | BODY MASS INDEX: 31.99 KG/M2 | OXYGEN SATURATION: 98 % | HEIGHT: 65 IN | DIASTOLIC BLOOD PRESSURE: 70 MMHG | TEMPERATURE: 97.9 F | SYSTOLIC BLOOD PRESSURE: 130 MMHG | WEIGHT: 192 LBS

## 2024-11-27 DIAGNOSIS — R94.31 ABNORMAL EKG: ICD-10-CM

## 2024-11-27 DIAGNOSIS — K21.00 GASTROESOPHAGEAL REFLUX DISEASE WITH ESOPHAGITIS WITHOUT HEMORRHAGE: Primary | ICD-10-CM

## 2024-11-27 DIAGNOSIS — F41.9 ANXIETY: ICD-10-CM

## 2024-11-27 DIAGNOSIS — R49.0 HOARSENESS OF VOICE: ICD-10-CM

## 2024-11-27 NOTE — PROGRESS NOTES
Chief Complaint   Patient presents with    Follow-up     Patient reports being seen in ED on 11/24/24, cough         History:  Gladys Mcmillan is a 44 y.o. female who presents today for evaluation of the above problems.      HPI  History of Present Illness  The patient presents for evaluation of multiple medical concerns.    She has been experiencing a persistent cough and hoarseness since Sunday morning, along with a long-standing habit of throat clearing. She reports a significant increase in throat clearing over the past two months, to the point where it is noticeable to her coworkers. She describes the sensation of a glob of mucus in her throat that she is unable to expel. Despite feeling better today, she has been unwell for the past week, even taking time off work. She had a sinus infection a few weeks ago, which was treated with steroids and antibiotics. She continues to take Protonix daily. An endoscopy was performed at [facility] over a decade ago, but the results are not available. She recalls that her last endoscopy at [facility] indicated borderline Liu's esophagus. An EGD is scheduled for Tuesday due to the worsening of this symptom.    She has been experiencing cold sweats and fatigue. Her EKG showed nonspecific ST abnormality and normal sinus rhythm. She has noticed fluctuations in her heart rate, ranging from the 50s or 60s to the 130s. She has a family history of heart disease on both sides. She has been on Adderall for approximately 20 years with the dose recently being increased.    She also reports having anxiety. Her thyroid ultrasound showed a nodule.      ROS:  Review of Systems  As above      Current Outpatient Medications:     AMPHETAMINE-DEXTROAMPHETAMINE PO, Take 40 mg by mouth 2 (Two) Times a Day., Disp: , Rfl:     busPIRone (BUSPAR) 15 MG tablet, Take 1 tablet by mouth 3 (Three) Times a Day., Disp: 90 tablet, Rfl: 1    EPINEPHrine (EPIPEN) 0.3 MG/0.3ML solution auto-injector  injection, , Disp: , Rfl:     FLUoxetine (PROzac) 40 MG capsule, Take 1 capsule by mouth Daily., Disp: , Rfl:     fluticasone (FLONASE) 50 MCG/ACT nasal spray, Administer 2 sprays into the nostril(s) as directed by provider Daily., Disp: 15.8 mL, Rfl: 5    levocetirizine (XYZAL) 5 MG tablet, Take 1 tablet by mouth Every Evening., Disp: 90 tablet, Rfl: 1    pantoprazole (PROTONIX) 40 MG EC tablet, Take 1 tablet by mouth Daily., Disp: 30 tablet, Rfl: 11    pregabalin (LYRICA) 75 MG capsule, Take 2 capsules by mouth 2 (Two) Times a Day. (Patient taking differently: Take 1 capsule by mouth 4 (Four) Times a Day.), Disp: 120 capsule, Rfl: 3    Semaglutide-Weight Management (Wegovy) 0.25 MG/0.5ML solution auto-injector, Inject 0.5 mL under the skin into the appropriate area as directed 1 (One) Time Per Week., Disp: 2 mL, Rfl: 0    tiZANidine (ZANAFLEX) 4 MG tablet, TAKE 1/4-1 TABLET BY MOUTH EVERY 6 TO 8 HOURS, Disp: , Rfl:     Lab Results   Component Value Date    GLUCOSE 92 11/24/2024    BUN 16 11/24/2024    CREATININE 0.75 11/24/2024     11/24/2024    K 3.9 11/24/2024     11/24/2024    CALCIUM 9.7 11/24/2024    PROTEINTOT 7.1 11/24/2024    ALBUMIN 4.2 11/24/2024    ALT 19 11/24/2024    AST 19 11/24/2024    ALKPHOS 68 11/24/2024    BILITOT 0.4 11/24/2024    GLOB 2.9 11/24/2024    AGRATIO 1.4 11/24/2024    BCR 21.3 11/24/2024    ANIONGAP 15.0 11/24/2024    EGFR 101.5 11/24/2024       WBC   Date Value Ref Range Status   11/24/2024 5.45 3.40 - 10.80 10*3/mm3 Final   06/04/2018 5.5 4.8 - 10.8 K/uL Final     RBC   Date Value Ref Range Status   11/24/2024 4.43 3.77 - 5.28 10*6/mm3 Final   06/04/2018 4.42 4.20 - 5.40 M/uL Final     Hemoglobin   Date Value Ref Range Status   11/24/2024 13.1 12.0 - 15.9 g/dL Final   06/04/2018 13.4 12.0 - 16.0 g/dL Final     Hematocrit   Date Value Ref Range Status   11/24/2024 37.8 34.0 - 46.6 % Final   06/04/2018 41.2 37.0 - 47.0 % Final     MCV   Date Value Ref Range Status    11/24/2024 85.3 79.0 - 97.0 fL Final   06/04/2018 93.2 81.0 - 99.0 fL Final     MCH   Date Value Ref Range Status   11/24/2024 29.6 26.6 - 33.0 pg Final   06/04/2018 30.3 27.0 - 31.0 pg Final     MCHC   Date Value Ref Range Status   11/24/2024 34.7 31.5 - 35.7 g/dL Final   06/04/2018 32.5 (L) 33.0 - 37.0 g/dL Final     RDW   Date Value Ref Range Status   11/24/2024 12.2 (L) 12.3 - 15.4 % Final   06/04/2018 11.8 11.5 - 14.5 % Final     RDW-SD   Date Value Ref Range Status   11/24/2024 37.8 37.0 - 54.0 fl Final     MPV   Date Value Ref Range Status   11/24/2024 9.4 6.0 - 12.0 fL Final   06/04/2018 10.2 9.4 - 12.3 fL Final     Platelets   Date Value Ref Range Status   11/24/2024 307 140 - 450 10*3/mm3 Final   06/04/2018 273 130 - 400 K/uL Final     Neutrophil Rel %   Date Value Ref Range Status   06/04/2018 71.7 (H) 50.0 - 65.0 % Final     Neutrophil %   Date Value Ref Range Status   11/24/2024 61.2 42.7 - 76.0 % Final     Lymphocyte Rel %   Date Value Ref Range Status   06/04/2018 20.0 20.0 - 40.0 % Final     Lymphocyte %   Date Value Ref Range Status   11/24/2024 27.5 19.6 - 45.3 % Final     Monocyte Rel %   Date Value Ref Range Status   06/04/2018 6.0 0.0 - 10.0 % Final     Monocyte %   Date Value Ref Range Status   11/24/2024 7.7 5.0 - 12.0 % Final     Eosinophil Rel %   Date Value Ref Range Status   06/04/2018 1.1 0.0 - 5.0 % Final     Eosinophil %   Date Value Ref Range Status   11/24/2024 2.8 0.3 - 6.2 % Final     Basophil Rel %   Date Value Ref Range Status   06/04/2018 0.7 0.0 - 1.0 % Final     Basophil %   Date Value Ref Range Status   11/24/2024 0.6 0.0 - 1.5 % Final     Immature Grans %   Date Value Ref Range Status   11/24/2024 0.2 0.0 - 0.5 % Final     Neutrophils Absolute   Date Value Ref Range Status   06/04/2018 4.0 1.5 - 7.5 K/uL Final     Neutrophils, Absolute   Date Value Ref Range Status   11/24/2024 3.34 1.70 - 7.00 10*3/mm3 Final     Lymphocytes Absolute   Date Value Ref Range Status  "  06/04/2018 1.1 1.1 - 4.5 K/uL Final     Lymphocytes, Absolute   Date Value Ref Range Status   11/24/2024 1.50 0.70 - 3.10 10*3/mm3 Final     Monocytes Absolute   Date Value Ref Range Status   06/04/2018 0.30 0.00 - 0.90 K/uL Final     Monocytes, Absolute   Date Value Ref Range Status   11/24/2024 0.42 0.10 - 0.90 10*3/mm3 Final     Eosinophils Absolute   Date Value Ref Range Status   06/04/2018 0.10 0.00 - 0.60 K/uL Final     Eosinophils, Absolute   Date Value Ref Range Status   11/24/2024 0.15 0.00 - 0.40 10*3/mm3 Final     Basophils Absolute   Date Value Ref Range Status   06/04/2018 0.00 0.00 - 0.20 K/uL Final     Basophils, Absolute   Date Value Ref Range Status   11/24/2024 0.03 0.00 - 0.20 10*3/mm3 Final     Immature Grans, Absolute   Date Value Ref Range Status   11/24/2024 0.01 0.00 - 0.05 10*3/mm3 Final     nRBC   Date Value Ref Range Status   11/24/2024 0.0 0.0 - 0.2 /100 WBC Final         OBJECTIVE:  Visit Vitals  /70 (BP Location: Right arm, Patient Position: Sitting, Cuff Size: Adult)   Pulse 105   Temp 97.9 °F (36.6 °C) (Temporal)   Ht 165.1 cm (65\")   Wt 87.1 kg (192 lb)   LMP 11/01/2023   SpO2 98%   BMI 31.95 kg/m²      Physical Exam  Constitutional:       General: She is not in acute distress.     Appearance: She is well-developed. She is not diaphoretic.   HENT:      Head: Normocephalic and atraumatic.      Mouth/Throat:      Mouth: Mucous membranes are moist.      Pharynx: No oropharyngeal exudate.      Comments: Very frequent clearing of her throat  Eyes:      Pupils: Pupils are equal, round, and reactive to light.   Neck:      Thyroid: No thyromegaly.      Trachea: Phonation normal.   Cardiovascular:      Rate and Rhythm: Normal rate and regular rhythm.      Heart sounds: No murmur heard.  Pulmonary:      Effort: No respiratory distress.      Breath sounds: No wheezing, rhonchi or rales.   Skin:     Coloration: Skin is not pale.      Findings: No erythema.   Neurological:      Mental " Status: She is alert.   Psychiatric:         Behavior: Behavior normal.         Thought Content: Thought content normal.         Judgment: Judgment normal.         Results  laboratory Studies from April 2024  TSH was 0.98, free T4 was 1.0.    Imaging from November 24, 2020  Chest X-ray and CT scan of the chest were clear.    Testing from November 24, 2024  EKG showed nonspecific ST wave changes and normal sinus rhythm.      ECG 12 Lead    Date/Time: 11/27/2024 11:31 AM  Performed by: KEMAL Mackay MD    Authorized by: KEMAL Mackay MD  Comparison: compared with previous ECG from 11/24/2024  Comparison to previous ECG: ST changes have resolved  Rhythm: sinus rhythm  Rate: normal  BPM: 80  Conduction: conduction normal  ST Segments: ST segments normal  T Waves: T waves normal  QRS axis: normal  Other: no other findings    Clinical impression: normal ECG          Assessment/Plan      Diagnoses and all orders for this visit:    1. Gastroesophageal reflux disease with esophagitis without hemorrhage (Primary)    2. Abnormal EKG  -     ECG 12 Lead    3. Hoarseness of voice    4. Anxiety      Assessment & Plan  1. Cough and hoarseness.  She has been experiencing coughing and hoarseness since Chris morning, with a history of throat clearing for years. A chest x-ray and CT scan performed in the ER were negative for lung issues. An EGD is scheduled for Tuesday to investigate further. If the EGD does not reveal any abnormalities, a referral to an ENT specialist will be considered. She is advised to continue taking Protonix daily.  I do not believe she needs any additional workup or studies prior to her EGD on Tuesday.    2. Cold sweats.  She reports experiencing cold sweats and fluctuating heart rates. An EKG performed recently showed nonspecific ST abnormalities and normal sinus rhythm. A repeat EKG was performed today in the office and was normal. She has been advised to discontinue her ADHD medication and ensure  adequate hydration.       Return in about 4 weeks (around 12/25/2024) for Annual physical.      RONEY Mackay MD  10:27 CST  11/27/2024   Electronically signed      Patient or patient representative verbalized consent for the use of Ambient Listening during the visit with  KEMAL Mackay MD for chart documentation. 11/27/2024  11:33 CST

## 2024-12-03 ENCOUNTER — HOSPITAL ENCOUNTER (OUTPATIENT)
Facility: HOSPITAL | Age: 44
Setting detail: HOSPITAL OUTPATIENT SURGERY
Discharge: HOME | End: 2024-12-03
Attending: INTERNAL MEDICINE | Admitting: INTERNAL MEDICINE
Payer: COMMERCIAL

## 2024-12-03 ENCOUNTER — ANESTHESIA (OUTPATIENT)
Dept: GASTROENTEROLOGY | Facility: HOSPITAL | Age: 44
End: 2024-12-03
Payer: COMMERCIAL

## 2024-12-03 ENCOUNTER — ANESTHESIA EVENT (OUTPATIENT)
Dept: GASTROENTEROLOGY | Facility: HOSPITAL | Age: 44
End: 2024-12-03
Payer: COMMERCIAL

## 2024-12-03 VITALS
SYSTOLIC BLOOD PRESSURE: 128 MMHG | HEIGHT: 65 IN | TEMPERATURE: 96.8 F | WEIGHT: 188 LBS | OXYGEN SATURATION: 96 % | RESPIRATION RATE: 13 BRPM | BODY MASS INDEX: 31.32 KG/M2 | DIASTOLIC BLOOD PRESSURE: 81 MMHG | HEART RATE: 82 BPM

## 2024-12-03 DIAGNOSIS — K21.00 GASTROESOPHAGEAL REFLUX DISEASE WITH ESOPHAGITIS WITHOUT HEMORRHAGE: ICD-10-CM

## 2024-12-03 PROCEDURE — 88305 TISSUE EXAM BY PATHOLOGIST: CPT | Performed by: INTERNAL MEDICINE

## 2024-12-03 PROCEDURE — 43239 EGD BIOPSY SINGLE/MULTIPLE: CPT | Performed by: INTERNAL MEDICINE

## 2024-12-03 PROCEDURE — 25010000002 LIDOCAINE PF 2% 2 % SOLUTION: Performed by: NURSE ANESTHETIST, CERTIFIED REGISTERED

## 2024-12-03 PROCEDURE — 25010000002 PROPOFOL 10 MG/ML EMULSION: Performed by: NURSE ANESTHETIST, CERTIFIED REGISTERED

## 2024-12-03 RX ORDER — SODIUM CHLORIDE 0.9 % (FLUSH) 0.9 %
10 SYRINGE (ML) INJECTION AS NEEDED
Status: DISCONTINUED | OUTPATIENT
Start: 2024-12-03 | End: 2024-12-03 | Stop reason: HOSPADM

## 2024-12-03 RX ORDER — LIDOCAINE HYDROCHLORIDE 20 MG/ML
INJECTION, SOLUTION EPIDURAL; INFILTRATION; INTRACAUDAL; PERINEURAL AS NEEDED
Status: DISCONTINUED | OUTPATIENT
Start: 2024-12-03 | End: 2024-12-03 | Stop reason: SURG

## 2024-12-03 RX ORDER — PROPOFOL 10 MG/ML
VIAL (ML) INTRAVENOUS AS NEEDED
Status: DISCONTINUED | OUTPATIENT
Start: 2024-12-03 | End: 2024-12-03 | Stop reason: SURG

## 2024-12-03 RX ORDER — SODIUM CHLORIDE 9 MG/ML
500 INJECTION, SOLUTION INTRAVENOUS CONTINUOUS PRN
Status: DISCONTINUED | OUTPATIENT
Start: 2024-12-03 | End: 2024-12-03 | Stop reason: HOSPADM

## 2024-12-03 RX ADMIN — LIDOCAINE HYDROCHLORIDE 100 MG: 20 INJECTION, SOLUTION EPIDURAL; INFILTRATION; INTRACAUDAL; PERINEURAL at 07:56

## 2024-12-03 RX ADMIN — Medication 10 ML: at 07:50

## 2024-12-03 RX ADMIN — PROPOFOL 200 MG: 10 INJECTION, EMULSION INTRAVENOUS at 07:56

## 2024-12-03 NOTE — ANESTHESIA POSTPROCEDURE EVALUATION
Patient: Gladys Mcmillan    Procedure Summary       Date: 12/03/24 Room / Location: [facility] ENDOSCOPY 6 / [facility]    Anesthesia Start: 0754 Anesthesia Stop: 0804    Procedure: ESOPHAGOGASTRODUODENOSCOPY WITH ANESTHESIA Diagnosis:       Gastroesophageal reflux disease with esophagitis without hemorrhage      (Gastroesophageal reflux disease with esophagitis without hemorrhage [K21.00])    Surgeons: Brian Pike MD Provider: Ravinder Sheth CRNA    Anesthesia Type: MAC ASA Status: 2            Anesthesia Type: MAC    Vitals  Vitals Value Taken Time   /61 12/03/24 0803   Temp     Pulse 79 12/03/24 0804   Resp     SpO2 98 % 12/03/24 0804   Vitals shown include unfiled device data.        Post Anesthesia Care and Evaluation    Patient location during evaluation: PHASE II  Patient participation: complete - patient participated  Level of consciousness: awake  Pain management: adequate    Airway patency: patent  Anesthetic complications: No anesthetic complications  Respiratory status: acceptable  Hydration status: acceptable

## 2024-12-03 NOTE — ANESTHESIA PREPROCEDURE EVALUATION
Anesthesia Evaluation     Patient summary reviewed   no history of anesthetic complications:   NPO Solid Status: > 8 hours  NPO Liquid Status: > 2 hours           Airway   Mallampati: II  No difficulty expected  Dental - normal exam     Pulmonary    (+) a smoker (vapes) vape,sleep apnea on CPAP  Cardiovascular   Exercise tolerance: excellent (>7 METS)    ECG reviewed    (+) hypertension  (-) valvular problems/murmurs, dysrhythmias      Neuro/Psych  (+) headaches, numbness, psychiatric history  GI/Hepatic/Renal/Endo    (+) obesity, GERD  (-) liver disease, no renal disease, diabetes    Musculoskeletal     (+) myalgias  Abdominal    Substance History      OB/GYN          Other                      Anesthesia Plan    ASA 2     MAC       Anesthetic plan, risks, benefits, and alternatives have been provided, discussed and informed consent has been obtained with: patient.

## 2024-12-04 DIAGNOSIS — R09.89 THROAT CLEARING: ICD-10-CM

## 2024-12-04 DIAGNOSIS — R09.A2 GLOBUS SENSATION: Primary | ICD-10-CM

## 2024-12-05 ENCOUNTER — TELEPHONE (OUTPATIENT)
Dept: INTERNAL MEDICINE | Facility: CLINIC | Age: 44
End: 2024-12-05
Payer: COMMERCIAL

## 2024-12-05 LAB
CYTO UR: NORMAL
LAB AP CASE REPORT: NORMAL
Lab: NORMAL
PATH REPORT.FINAL DX SPEC: NORMAL
PATH REPORT.GROSS SPEC: NORMAL

## 2024-12-05 NOTE — TELEPHONE ENCOUNTER
Caller: Gladys Mcmillan    Relationship: Self    Best call back number:     [phone]       What is the best time to reach you: ANY    Who are you requesting to speak with (clinical staff, provider,  specific staff member): PROVIDER     Do you know the name of the person who called: SELF    What was the call regarding: HER RESULTS SHE WAS CONCERNED ABOUT FROM HOSPITAL FOLLOW UP. SHE STARTED HAVING PAIN UNDER HER LEFT BREAST, NOW GOING TOWARD BACK.    PATIENT DIDN'T WANT TO COME IN BC SHE WAS SEEN LAST WEEK BEFORE HER SURGERY, BUT PLEASE CALL PATIENT BACK

## 2024-12-06 ENCOUNTER — OFFICE VISIT (OUTPATIENT)
Dept: INTERNAL MEDICINE | Facility: CLINIC | Age: 44
End: 2024-12-06
Payer: COMMERCIAL

## 2024-12-06 ENCOUNTER — PATIENT MESSAGE (OUTPATIENT)
Dept: INTERNAL MEDICINE | Facility: CLINIC | Age: 44
End: 2024-12-06

## 2024-12-06 ENCOUNTER — HOSPITAL ENCOUNTER (OUTPATIENT)
Dept: GENERAL RADIOLOGY | Facility: HOSPITAL | Age: 44
Discharge: HOME | End: 2024-12-06
Admitting: INTERNAL MEDICINE
Payer: COMMERCIAL

## 2024-12-06 VITALS
BODY MASS INDEX: 31.32 KG/M2 | SYSTOLIC BLOOD PRESSURE: 122 MMHG | HEIGHT: 65 IN | WEIGHT: 188 LBS | TEMPERATURE: 97.7 F | HEART RATE: 86 BPM | DIASTOLIC BLOOD PRESSURE: 84 MMHG

## 2024-12-06 DIAGNOSIS — R07.89 MUSCULOSKELETAL CHEST PAIN: ICD-10-CM

## 2024-12-06 DIAGNOSIS — R05.1 ACUTE COUGH: ICD-10-CM

## 2024-12-06 DIAGNOSIS — R09.89 CHRONIC THROAT CLEARING: ICD-10-CM

## 2024-12-06 DIAGNOSIS — K21.00 GASTROESOPHAGEAL REFLUX DISEASE WITH ESOPHAGITIS WITHOUT HEMORRHAGE: Primary | ICD-10-CM

## 2024-12-06 DIAGNOSIS — K29.50 CHRONIC GASTRITIS WITHOUT BLEEDING, UNSPECIFIED GASTRITIS TYPE: ICD-10-CM

## 2024-12-06 DIAGNOSIS — R49.0 HOARSENESS OF VOICE: ICD-10-CM

## 2024-12-06 DIAGNOSIS — K20.90 ESOPHAGITIS DETERMINED BY BIOPSY: ICD-10-CM

## 2024-12-06 PROCEDURE — 71046 X-RAY EXAM CHEST 2 VIEWS: CPT

## 2024-12-06 RX ORDER — METHYLPREDNISOLONE 4 MG/1
TABLET ORAL
Qty: 21 TABLET | Refills: 0 | Status: SHIPPED | OUTPATIENT
Start: 2024-12-06

## 2024-12-06 NOTE — PROGRESS NOTES
Chief Complaint   Patient presents with    Back Pain         History:  Gladys Mcmillan is a 44 y.o. female who presents today for evaluation of the above problems.      Back Pain      History of Present Illness  The patient presents for evaluation of chest pain.    She reports severe pain in her sternum that radiates around her left chest and back, extending to her left upper back shoulder blade. The area is tender to touch. The pain is constant and intensifies when she breathes or sniffs. She also experiences occasional shortness of breath. There are no visible rashes or bruising.  Symptoms started after her recent EGD.    She has had a longstanding history of throat clearing for many years but over the last few months he has significantly progressed and worsened.  She mentions a sensation of mucus in her throat, which leads to coughing if not cleared. She has a history of acid reflux and has undergone an endoscopy, which revealed no signs of reflux or esophageal inflammation. She has been advised to continue taking Protonix until the biopsy results are available.        ROS:  Review of Systems   Musculoskeletal:  Positive for back pain.         Current Outpatient Medications:     AMPHETAMINE-DEXTROAMPHETAMINE PO, Take 40 mg by mouth 2 (Two) Times a Day., Disp: , Rfl:     busPIRone (BUSPAR) 15 MG tablet, Take 1 tablet by mouth 3 (Three) Times a Day., Disp: 90 tablet, Rfl: 1    EPINEPHrine (EPIPEN) 0.3 MG/0.3ML solution auto-injector injection, , Disp: , Rfl:     FLUoxetine (PROzac) 40 MG capsule, Take 1 capsule by mouth Daily., Disp: , Rfl:     fluticasone (FLONASE) 50 MCG/ACT nasal spray, Administer 2 sprays into the nostril(s) as directed by provider Daily., Disp: 15.8 mL, Rfl: 5    levocetirizine (XYZAL) 5 MG tablet, Take 1 tablet by mouth Every Evening., Disp: 90 tablet, Rfl: 1    pantoprazole (PROTONIX) 40 MG EC tablet, Take 1 tablet by mouth Daily., Disp: 30 tablet, Rfl: 11    pregabalin (LYRICA) 75 MG  capsule, Take 2 capsules by mouth 2 (Two) Times a Day. (Patient taking differently: Take 1 capsule by mouth 4 (Four) Times a Day.), Disp: 120 capsule, Rfl: 3    Semaglutide-Weight Management (Wegovy) 0.25 MG/0.5ML solution auto-injector, Inject 0.5 mL under the skin into the appropriate area as directed 1 (One) Time Per Week., Disp: 2 mL, Rfl: 0    tiZANidine (ZANAFLEX) 4 MG tablet, TAKE 1/4-1 TABLET BY MOUTH EVERY 6 TO 8 HOURS, Disp: , Rfl:     methylPREDNISolone (MEDROL) 4 MG dose pack, Take as directed on package instructions., Disp: 21 tablet, Rfl: 0    Lab Results   Component Value Date    GLUCOSE 92 11/24/2024    BUN 16 11/24/2024    CREATININE 0.75 11/24/2024     11/24/2024    K 3.9 11/24/2024     11/24/2024    CALCIUM 9.7 11/24/2024    PROTEINTOT 7.1 11/24/2024    ALBUMIN 4.2 11/24/2024    ALT 19 11/24/2024    AST 19 11/24/2024    ALKPHOS 68 11/24/2024    BILITOT 0.4 11/24/2024    GLOB 2.9 11/24/2024    AGRATIO 1.4 11/24/2024    BCR 21.3 11/24/2024    ANIONGAP 15.0 11/24/2024    EGFR 101.5 11/24/2024       WBC   Date Value Ref Range Status   11/24/2024 5.45 3.40 - 10.80 10*3/mm3 Final   06/04/2018 5.5 4.8 - 10.8 K/uL Final     RBC   Date Value Ref Range Status   11/24/2024 4.43 3.77 - 5.28 10*6/mm3 Final   06/04/2018 4.42 4.20 - 5.40 M/uL Final     Hemoglobin   Date Value Ref Range Status   11/24/2024 13.1 12.0 - 15.9 g/dL Final   06/04/2018 13.4 12.0 - 16.0 g/dL Final     Hematocrit   Date Value Ref Range Status   11/24/2024 37.8 34.0 - 46.6 % Final   06/04/2018 41.2 37.0 - 47.0 % Final     MCV   Date Value Ref Range Status   11/24/2024 85.3 79.0 - 97.0 fL Final   06/04/2018 93.2 81.0 - 99.0 fL Final     MCH   Date Value Ref Range Status   11/24/2024 29.6 26.6 - 33.0 pg Final   06/04/2018 30.3 27.0 - 31.0 pg Final     MCHC   Date Value Ref Range Status   11/24/2024 34.7 31.5 - 35.7 g/dL Final   06/04/2018 32.5 (L) 33.0 - 37.0 g/dL Final     RDW   Date Value Ref Range Status   11/24/2024 12.2 (L)  12.3 - 15.4 % Final   06/04/2018 11.8 11.5 - 14.5 % Final     RDW-SD   Date Value Ref Range Status   11/24/2024 37.8 37.0 - 54.0 fl Final     MPV   Date Value Ref Range Status   11/24/2024 9.4 6.0 - 12.0 fL Final   06/04/2018 10.2 9.4 - 12.3 fL Final     Platelets   Date Value Ref Range Status   11/24/2024 307 140 - 450 10*3/mm3 Final   06/04/2018 273 130 - 400 K/uL Final     Neutrophil Rel %   Date Value Ref Range Status   06/04/2018 71.7 (H) 50.0 - 65.0 % Final     Neutrophil %   Date Value Ref Range Status   11/24/2024 61.2 42.7 - 76.0 % Final     Lymphocyte Rel %   Date Value Ref Range Status   06/04/2018 20.0 20.0 - 40.0 % Final     Lymphocyte %   Date Value Ref Range Status   11/24/2024 27.5 19.6 - 45.3 % Final     Monocyte Rel %   Date Value Ref Range Status   06/04/2018 6.0 0.0 - 10.0 % Final     Monocyte %   Date Value Ref Range Status   11/24/2024 7.7 5.0 - 12.0 % Final     Eosinophil Rel %   Date Value Ref Range Status   06/04/2018 1.1 0.0 - 5.0 % Final     Eosinophil %   Date Value Ref Range Status   11/24/2024 2.8 0.3 - 6.2 % Final     Basophil Rel %   Date Value Ref Range Status   06/04/2018 0.7 0.0 - 1.0 % Final     Basophil %   Date Value Ref Range Status   11/24/2024 0.6 0.0 - 1.5 % Final     Immature Grans %   Date Value Ref Range Status   11/24/2024 0.2 0.0 - 0.5 % Final     Neutrophils Absolute   Date Value Ref Range Status   06/04/2018 4.0 1.5 - 7.5 K/uL Final     Neutrophils, Absolute   Date Value Ref Range Status   11/24/2024 3.34 1.70 - 7.00 10*3/mm3 Final     Lymphocytes Absolute   Date Value Ref Range Status   06/04/2018 1.1 1.1 - 4.5 K/uL Final     Lymphocytes, Absolute   Date Value Ref Range Status   11/24/2024 1.50 0.70 - 3.10 10*3/mm3 Final     Monocytes Absolute   Date Value Ref Range Status   06/04/2018 0.30 0.00 - 0.90 K/uL Final     Monocytes, Absolute   Date Value Ref Range Status   11/24/2024 0.42 0.10 - 0.90 10*3/mm3 Final     Eosinophils Absolute   Date Value Ref Range Status  "  06/04/2018 0.10 0.00 - 0.60 K/uL Final     Eosinophils, Absolute   Date Value Ref Range Status   11/24/2024 0.15 0.00 - 0.40 10*3/mm3 Final     Basophils Absolute   Date Value Ref Range Status   06/04/2018 0.00 0.00 - 0.20 K/uL Final     Basophils, Absolute   Date Value Ref Range Status   11/24/2024 0.03 0.00 - 0.20 10*3/mm3 Final     Immature Grans, Absolute   Date Value Ref Range Status   11/24/2024 0.01 0.00 - 0.05 10*3/mm3 Final     nRBC   Date Value Ref Range Status   11/24/2024 0.0 0.0 - 0.2 /100 WBC Final         OBJECTIVE:  Visit Vitals  /84 (BP Location: Left arm, Patient Position: Sitting, Cuff Size: Adult)   Pulse 86   Temp 97.7 °F (36.5 °C) (Temporal)   Ht 165.1 cm (65\")   Wt 85.3 kg (188 lb)   LMP 11/01/2023   BMI 31.28 kg/m²      Physical Exam  Constitutional:       General: She is not in acute distress.     Appearance: She is well-developed. She is not diaphoretic.   HENT:      Head: Normocephalic and atraumatic.      Mouth/Throat:      Comments: Almost constant forceful clearing of her throat  Eyes:      Pupils: Pupils are equal, round, and reactive to light.   Neck:      Thyroid: No thyromegaly.      Trachea: Phonation normal.   Cardiovascular:      Rate and Rhythm: Normal rate and regular rhythm.      Heart sounds: No murmur heard.  Pulmonary:      Effort: No respiratory distress.      Breath sounds: No wheezing or rales.      Comments: I did not appreciate a pleural friction rub  Musculoskeletal:        Arms:    Skin:     Coloration: Skin is not pale.      Findings: No erythema.   Neurological:      Mental Status: She is alert.   Psychiatric:         Behavior: Behavior normal.         Thought Content: Thought content normal.         Judgment: Judgment normal.         Results  Laboratory Studies  Biopsy report shows chronic active esophagogastritis, which is moderate.    Assessment/Plan      Diagnoses and all orders for this visit:    1. Gastroesophageal reflux disease with esophagitis " without hemorrhage (Primary)    2. Hoarseness of voice  -     Ambulatory Referral to ENT (Otolaryngology)  -     XR Chest PA & Lateral; Future    3. Chronic throat clearing  -     Ambulatory Referral to ENT (Otolaryngology)  -     XR Chest PA & Lateral; Future    4. Musculoskeletal chest pain  -     Ambulatory Referral to ENT (Otolaryngology)  -     methylPREDNISolone (MEDROL) 4 MG dose pack; Take as directed on package instructions.  Dispense: 21 tablet; Refill: 0  -     XR Chest PA & Lateral; Future    5. Esophagitis determined by biopsy    6. Chronic gastritis without bleeding, unspecified gastritis type    7. Acute cough  -     XR Chest PA & Lateral; Future      Assessment & Plan  1. Chest pain.  The symptoms suggest a possible case of costochondritis or pleurisy.  I would recommend avoiding NSAIDs for 2 to the biopsy results showing esophagitis/gastritis at the GE junction. The pain is exacerbated by coughing and throat clearing. A course of steroids will be initiated to manage the pain and inflammation.     2. Chronic active esophagogastritis.  The biopsy report shows chronic active esophagogastritis, which is moderate. She is instructed to continue taking Protonix.  Further instruction per gastroenterology    3. Throat clearing and coughing.  The persistent and severe throat clearing and coughing may be contributing to the chest pain. An urgent referral to an ENT specialist has been made for further evaluation.       Return for Next scheduled follow up.      RONEY Mackay MD  12:31 CST  12/6/2024   Electronically signed      Patient or patient representative verbalized consent for the use of Ambient Listening during the visit with  KEMAL Mackay MD for chart documentation. 12/6/2024  12:34 CST

## 2024-12-09 ENCOUNTER — PATIENT MESSAGE (OUTPATIENT)
Dept: INTERNAL MEDICINE | Facility: CLINIC | Age: 44
End: 2024-12-09
Payer: COMMERCIAL

## 2024-12-09 NOTE — TELEPHONE ENCOUNTER
Based on the location of her pain I asked about a rash for the possibility of shingles.  The photos she provided are not consistent with shingles.  It is unclear what is causing her symptom of rash, however.

## 2024-12-11 ENCOUNTER — PATIENT MESSAGE (OUTPATIENT)
Dept: INTERNAL MEDICINE | Facility: CLINIC | Age: 44
End: 2024-12-11
Payer: COMMERCIAL

## 2024-12-11 ENCOUNTER — TELEPHONE (OUTPATIENT)
Dept: GASTROENTEROLOGY | Facility: CLINIC | Age: 44
End: 2024-12-11
Payer: COMMERCIAL

## 2024-12-11 DIAGNOSIS — R10.13 EPIGASTRIC PAIN: Primary | ICD-10-CM

## 2024-12-11 RX ORDER — HYOSCYAMINE SULFATE 0.125 MG
0.12 TABLET ORAL
Qty: 120 TABLET | Refills: 5 | Status: SHIPPED | OUTPATIENT
Start: 2024-12-11

## 2024-12-11 NOTE — TELEPHONE ENCOUNTER
I would highly recommend she call the gastroenterology office as soon as possible, and consider being evaluated at urgent care or the emergency room as soon as possible as well.

## 2024-12-11 NOTE — TELEPHONE ENCOUNTER
Patient called and has been having days of epigastric pain under left breast and now radiating up between her shoulder blades. It is moderate to severe. She is anxious and upset over the phone. I advised her to go to the ER for evaluation. She does not want to go. I offered and esophagram and added Levsin for esophageal spasm. She has had chest xray 12/6/24 and there were no acute findings. She has ENT as well as neurology. She has had EKG and repeat EKG. She had endoscopy - Normal examined duodenum. - Normal stomach. - Z-line keyanna  Final Diagnosis   Gastroesophageal junction at 35 cm, endoscopic biopsy:  - Chronic active esophagogastritis, moderate.  - No intestinal metaplasia identified.  - No dysplasia identified.   Electronically signed by Anne Shrestha MD on 12/5/2024 at 1013   iable, 35 cm from the incisors. Biopsied.   Lucrecia Liu, APRN  12/11/2024  08:56 CST

## 2024-12-12 ENCOUNTER — HOSPITAL ENCOUNTER (OUTPATIENT)
Dept: GENERAL RADIOLOGY | Facility: HOSPITAL | Age: 44
Discharge: HOME | End: 2024-12-12
Admitting: CLINICAL NURSE SPECIALIST
Payer: COMMERCIAL

## 2024-12-12 DIAGNOSIS — R10.13 EPIGASTRIC PAIN: ICD-10-CM

## 2024-12-12 PROCEDURE — 74220 X-RAY XM ESOPHAGUS 1CNTRST: CPT

## 2024-12-12 RX ADMIN — BARIUM SULFATE 120 ML: 980 POWDER, FOR SUSPENSION ORAL at 14:35

## 2024-12-13 ENCOUNTER — APPOINTMENT (OUTPATIENT)
Dept: GENERAL RADIOLOGY | Facility: HOSPITAL | Age: 44
End: 2024-12-13
Payer: COMMERCIAL

## 2024-12-13 ENCOUNTER — HOSPITAL ENCOUNTER (EMERGENCY)
Facility: HOSPITAL | Age: 44
Discharge: HOME | End: 2024-12-13
Attending: FAMILY MEDICINE
Payer: COMMERCIAL

## 2024-12-13 VITALS
RESPIRATION RATE: 18 BRPM | OXYGEN SATURATION: 97 % | BODY MASS INDEX: 30.49 KG/M2 | WEIGHT: 183 LBS | TEMPERATURE: 98.8 F | DIASTOLIC BLOOD PRESSURE: 100 MMHG | HEIGHT: 65 IN | SYSTOLIC BLOOD PRESSURE: 126 MMHG | HEART RATE: 86 BPM

## 2024-12-13 DIAGNOSIS — K20.90 ESOPHAGITIS: ICD-10-CM

## 2024-12-13 DIAGNOSIS — K29.00 OTHER ACUTE GASTRITIS WITHOUT HEMORRHAGE: Primary | ICD-10-CM

## 2024-12-13 LAB
ALBUMIN SERPL-MCNC: 4.2 G/DL (ref 3.5–5.2)
ALBUMIN/GLOB SERPL: 1.6 G/DL
ALP SERPL-CCNC: 70 U/L (ref 39–117)
ALT SERPL W P-5'-P-CCNC: 16 U/L (ref 1–33)
ANION GAP SERPL CALCULATED.3IONS-SCNC: 13 MMOL/L (ref 5–15)
AST SERPL-CCNC: 17 U/L (ref 1–32)
BASOPHILS # BLD AUTO: 0.03 10*3/MM3 (ref 0–0.2)
BASOPHILS NFR BLD AUTO: 0.4 % (ref 0–1.5)
BILIRUB SERPL-MCNC: 0.4 MG/DL (ref 0–1.2)
BUN SERPL-MCNC: 18 MG/DL (ref 6–20)
BUN/CREAT SERPL: 26.9 (ref 7–25)
CALCIUM SPEC-SCNC: 9.4 MG/DL (ref 8.6–10.5)
CHLORIDE SERPL-SCNC: 100 MMOL/L (ref 98–107)
CO2 SERPL-SCNC: 26 MMOL/L (ref 22–29)
CREAT SERPL-MCNC: 0.67 MG/DL (ref 0.57–1)
D DIMER PPP FEU-MCNC: 0.43 MCGFEU/ML (ref 0–0.5)
DEPRECATED RDW RBC AUTO: 39 FL (ref 37–54)
EGFRCR SERPLBLD CKD-EPI 2021: 111.4 ML/MIN/1.73
EOSINOPHIL # BLD AUTO: 0.09 10*3/MM3 (ref 0–0.4)
EOSINOPHIL NFR BLD AUTO: 1.2 % (ref 0.3–6.2)
ERYTHROCYTE [DISTWIDTH] IN BLOOD BY AUTOMATED COUNT: 12.1 % (ref 12.3–15.4)
GEN 5 1HR TROPONIN T REFLEX: <6 NG/L
GLOBULIN UR ELPH-MCNC: 2.7 GM/DL
GLUCOSE SERPL-MCNC: 94 MG/DL (ref 65–99)
HCT VFR BLD AUTO: 38 % (ref 34–46.6)
HGB BLD-MCNC: 12.8 G/DL (ref 12–15.9)
HOLD SPECIMEN: NORMAL
HOLD SPECIMEN: NORMAL
IMM GRANULOCYTES # BLD AUTO: 0.02 10*3/MM3 (ref 0–0.05)
IMM GRANULOCYTES NFR BLD AUTO: 0.3 % (ref 0–0.5)
LYMPHOCYTES # BLD AUTO: 2.3 10*3/MM3 (ref 0.7–3.1)
LYMPHOCYTES NFR BLD AUTO: 30.5 % (ref 19.6–45.3)
MCH RBC QN AUTO: 29.3 PG (ref 26.6–33)
MCHC RBC AUTO-ENTMCNC: 33.7 G/DL (ref 31.5–35.7)
MCV RBC AUTO: 87 FL (ref 79–97)
MONOCYTES # BLD AUTO: 0.46 10*3/MM3 (ref 0.1–0.9)
MONOCYTES NFR BLD AUTO: 6.1 % (ref 5–12)
NEUTROPHILS NFR BLD AUTO: 4.63 10*3/MM3 (ref 1.7–7)
NEUTROPHILS NFR BLD AUTO: 61.5 % (ref 42.7–76)
NRBC BLD AUTO-RTO: 0 /100 WBC (ref 0–0.2)
PLATELET # BLD AUTO: 307 10*3/MM3 (ref 140–450)
PMV BLD AUTO: 9.7 FL (ref 6–12)
POTASSIUM SERPL-SCNC: 3.4 MMOL/L (ref 3.5–5.2)
PROT SERPL-MCNC: 6.9 G/DL (ref 6–8.5)
QT INTERVAL: 374 MS
QT INTERVAL: 398 MS
QTC INTERVAL: 445 MS
QTC INTERVAL: 464 MS
RBC # BLD AUTO: 4.37 10*6/MM3 (ref 3.77–5.28)
SODIUM SERPL-SCNC: 139 MMOL/L (ref 136–145)
TROPONIN T NUMERIC DELTA: NORMAL
TROPONIN T SERPL HS-MCNC: <6 NG/L
WBC NRBC COR # BLD AUTO: 7.53 10*3/MM3 (ref 3.4–10.8)
WHOLE BLOOD HOLD COAG: NORMAL
WHOLE BLOOD HOLD SPECIMEN: NORMAL

## 2024-12-13 PROCEDURE — 85379 FIBRIN DEGRADATION QUANT: CPT | Performed by: FAMILY MEDICINE

## 2024-12-13 PROCEDURE — 99284 EMERGENCY DEPT VISIT MOD MDM: CPT

## 2024-12-13 PROCEDURE — 93005 ELECTROCARDIOGRAM TRACING: CPT | Performed by: FAMILY MEDICINE

## 2024-12-13 PROCEDURE — 85025 COMPLETE CBC W/AUTO DIFF WBC: CPT | Performed by: FAMILY MEDICINE

## 2024-12-13 PROCEDURE — 71045 X-RAY EXAM CHEST 1 VIEW: CPT

## 2024-12-13 PROCEDURE — 84484 ASSAY OF TROPONIN QUANT: CPT | Performed by: FAMILY MEDICINE

## 2024-12-13 PROCEDURE — 36415 COLL VENOUS BLD VENIPUNCTURE: CPT

## 2024-12-13 PROCEDURE — 93005 ELECTROCARDIOGRAM TRACING: CPT

## 2024-12-13 PROCEDURE — 80053 COMPREHEN METABOLIC PANEL: CPT | Performed by: FAMILY MEDICINE

## 2024-12-13 RX ORDER — ALUMINA, MAGNESIA, AND SIMETHICONE 2400; 2400; 240 MG/30ML; MG/30ML; MG/30ML
15 SUSPENSION ORAL ONCE
Status: COMPLETED | OUTPATIENT
Start: 2024-12-13 | End: 2024-12-13

## 2024-12-13 RX ORDER — SUCRALFATE 1 G/1
1 TABLET ORAL 4 TIMES DAILY
Qty: 56 TABLET | Refills: 0 | Status: SHIPPED | OUTPATIENT
Start: 2024-12-13 | End: 2024-12-27

## 2024-12-13 RX ORDER — ASPIRIN 81 MG/1
324 TABLET, CHEWABLE ORAL ONCE
Status: DISCONTINUED | OUTPATIENT
Start: 2024-12-13 | End: 2024-12-13

## 2024-12-13 RX ORDER — SODIUM CHLORIDE 0.9 % (FLUSH) 0.9 %
10 SYRINGE (ML) INJECTION AS NEEDED
Status: DISCONTINUED | OUTPATIENT
Start: 2024-12-13 | End: 2024-12-13

## 2024-12-13 RX ORDER — LIDOCAINE HYDROCHLORIDE 20 MG/ML
15 SOLUTION OROPHARYNGEAL ONCE
Status: COMPLETED | OUTPATIENT
Start: 2024-12-13 | End: 2024-12-13

## 2024-12-13 RX ADMIN — LIDOCAINE HYDROCHLORIDE 15 ML: 20 SOLUTION ORAL at 16:00

## 2024-12-13 RX ADMIN — ALUMINUM HYDROXIDE, MAGNESIUM HYDROXIDE, AND DIMETHICONE 15 ML: 400; 400; 40 SUSPENSION ORAL at 16:00

## 2024-12-13 NOTE — TELEPHONE ENCOUNTER
Thank you for letting me know.  I am sorry that she is continuing to feel poorly.  Hopefully, we have some answers after today's emergency room visit.  I did review the esophagram and it did not show any cause for her symptoms although there was suggestion of gastroparesis.  I also agree with discontinuation of Wegovy.

## 2024-12-13 NOTE — ED PROVIDER NOTES
HPI:     Patient is a 44-year-old white female presents to the emergency room with complaint of having substernal epigastric pain and left-sided chest pain.  Patient states this all started after endoscopy on December 3.  She had a biopsy done at that time.  Patient states she also has been having some intermittent coughing in the next day she went to see a doctor they did a chest x-ray and there was no pneumonia or free air or inflammation noted they started her on Medrol Dosepak.  Patient states after starting to take the Medrol Dosepak she started having worsening pain substernally and epigastrically and left-sided chest.  To the point where she states that she had to come in to be seen today.  Pain is sharp stabbing burning 9 out of 10.    REVIEW OF SYSTEMS  CONSTITUTIONAL:  No complaints of fever, chills,or weakness  EYES:  No complaints of discharge   ENT: No complaints of sore throat or ear pain  CARDIOVASCULAR:  No complaints of chest pain, palpitations, or swelling  RESPIRATORY: Painful chest with deep inspiration   GI: Positive for epigastric pain and substernal pain worse with movement and taking deep breath   MUSCULOSKELETAL:  No complaints of back pain  SKIN:  No complaints of rash  NEUROLOGIC:  No complaints of headache, focal weakness, or sensory changes  ENDOCRINE:  No complaints of polyuria or polydipsia  LYMPHATIC:  No complaints of swollen glands  GENITOURINARY: No complaints of urinary frequency or hematuria        PAST MEDICAL HISTORY  Past Medical History:   Diagnosis Date    ADD (attention deficit disorder)     Anxiety     Fibromyalgia, primary     Possibly?    GERD (gastroesophageal reflux disease)     Headache     HL (hearing loss)     Hypertension [id number]    Hypertension last spring    Irritable bowel syndrome     Kidney stone     Just once.    Low back pain     Neuromuscular disorder     Tingling and cramping daily    Osteopenia     Urinary tract infection     UTI a couple times in the  past    Visual impairment     Vitamin B 12 deficiency        FAMILY HISTORY  Family History   Problem Relation Age of Onset    Thyroid disease Mother     Hyperlipidemia Mother     Miscarriages / Stillbirths Mother     Anxiety disorder Father     Arthritis Father         Seronegative Rheumatoid Athritis    No Known Problems Brother     Breast cancer Maternal Grandmother 60        Had right breast removed     Diabetes Maternal Grandmother             Arthritis Maternal Grandmother             Cancer Maternal Grandmother         Breast/Lukemia    Depression Maternal Grandmother             Hearing loss Maternal Grandmother             Heart disease Maternal Grandmother             Colon cancer Maternal Grandfather 60    Cancer Maternal Grandfather         Colon    COPD Maternal Grandfather             Hearing loss Maternal Grandfather             Hyperlipidemia Maternal Grandfather             Colon cancer Paternal Grandmother     Skin cancer Paternal Grandmother     Colon polyps Paternal Grandmother     Cancer Paternal Grandmother         Colon    Diabetes Paternal Grandfather 60            Cancer Paternal Grandfather 60    Hearing loss Maternal Aunt         Before 40    Ovarian cancer Neg Hx     Uterine cancer Neg Hx     Melanoma Neg Hx        SOCIAL HISTORY  Social History     Socioeconomic History    Marital status:    Tobacco Use    Smoking status: Every Day     Current packs/day: 0.50     Average packs/day: 0.5 packs/day for 24.6 years (12.3 ttl pk-yrs)     Types: Electronic Cigarette, Cigarettes     Start date: 2000    Smokeless tobacco: Current    Tobacco comments:     Vapes   Vaping Use    Vaping status: Every Day    Substances: Nicotine    Devices: Disposable   Substance and Sexual Activity    Alcohol use: No    Drug use: No    Sexual activity: Yes     Partners: Male     Birth control/protection: Vasectomy, Hysterectomy      Comment: partner had vasectomy        IMMUNIZATION HISTORY  Deferred to primary care physician.    SURGICAL HISTORY  Past Surgical History:   Procedure Laterality Date    BREAST BIOPSY Right     benign    CHOLECYSTECTOMY      COLONOSCOPY  03/18/2016    Hemorrhoids otherwise normal exam repeat in 10 years    COLONOSCOPY N/A 05/21/2021    Dr. sargent- One 6 mm hyperplastic  polyp at 70 cm proximal to the anus, - One 3 mm polyp at 40 cm proximal to the anus,Superficial fragment of benign colonic mucosa demonstrating mild glandular hyperplastic changes-    ENDOSCOPY      ENDOSCOPY N/A 12/3/2024    Procedure: ESOPHAGOGASTRODUODENOSCOPY WITH ANESTHESIA;  Surgeon: Brian Sargent MD;  Location: [facility] ENDOSCOPY;  Service: Gastroenterology;  Laterality: N/A;  pre op: GERD  post op: gerd  pcp: KEMAL Mackay MD    FRACTURE SURGERY      HIP FRACTURE SURGERY Left     HYSTERECTOMY      TVH/    TONSILLECTOMY AND ADENOIDECTOMY         CURRENT MEDICATIONS    Current Facility-Administered Medications:     aluminum-magnesium hydroxide-simethicone (MAALOX MAX) 400-400-40 MG/5ML suspension 15 mL, 15 mL, Oral, Once, Noe Doan Jr., MD    aspirin chewable tablet 324 mg, 324 mg, Oral, Once, Noe Doan Jr., MD    Lidocaine Viscous HCl (XYLOCAINE) 2 % solution 15 mL, 15 mL, Mouth/Throat, Once, Noe Doan Jr., MD    sodium chloride 0.9 % flush 10 mL, 10 mL, Intravenous, PRN, Noe Doan Jr., MD    Current Outpatient Medications:     AMPHETAMINE-DEXTROAMPHETAMINE PO, Take 40 mg by mouth 2 (Two) Times a Day., Disp: , Rfl:     busPIRone (BUSPAR) 15 MG tablet, Take 1 tablet by mouth 3 (Three) Times a Day., Disp: 90 tablet, Rfl: 1    EPINEPHrine (EPIPEN) 0.3 MG/0.3ML solution auto-injector injection, , Disp: , Rfl:     FLUoxetine (PROzac) 40 MG capsule, Take 1 capsule by mouth Daily., Disp: , Rfl:     fluticasone (FLONASE) 50 MCG/ACT nasal spray, Administer 2 sprays into the nostril(s) as directed by  "provider Daily., Disp: 15.8 mL, Rfl: 5    hyoscyamine (ANASPAZ,LEVSIN) 0.125 MG tablet, Take 1 tablet by mouth 4 (Four) Times a Day With Meals & at Bedtime., Disp: 120 tablet, Rfl: 5    levocetirizine (XYZAL) 5 MG tablet, Take 1 tablet by mouth Every Evening., Disp: 90 tablet, Rfl: 1    pantoprazole (PROTONIX) 40 MG EC tablet, Take 1 tablet by mouth Daily., Disp: 30 tablet, Rfl: 11    pregabalin (LYRICA) 75 MG capsule, Take 2 capsules by mouth 2 (Two) Times a Day. (Patient taking differently: Take 1 capsule by mouth 4 (Four) Times a Day.), Disp: 120 capsule, Rfl: 3    Semaglutide-Weight Management (Wegovy) 0.25 MG/0.5ML solution auto-injector, Inject 0.5 mL under the skin into the appropriate area as directed 1 (One) Time Per Week., Disp: 2 mL, Rfl: 0    sucralfate (CARAFATE) 1 g tablet, Take 1 tablet by mouth 4 (Four) Times a Day for 14 days., Disp: 56 tablet, Rfl: 0    tiZANidine (ZANAFLEX) 4 MG tablet, TAKE 1/4-1 TABLET BY MOUTH EVERY 6 TO 8 HOURS, Disp: , Rfl:     ALLERGIES  Allergies   Allergen Reactions    Azithromycin Shortness Of Breath and Hives    Flavoring Agent Anaphylaxis    Strawberry Anaphylaxis    Butorphanol Hallucinations           Cardiac exam    VITAL SIGNS:  /72 (BP Location: Left arm, Patient Position: Sitting)   Pulse 69   Temp 98.8 °F (37.1 °C) (Oral)   Resp 18   Ht 165.1 cm (65\")   Wt 83 kg (183 lb)   LMP 11/01/2023   SpO2 99%   BMI 30.45 kg/m²     Constitutional: Patient is alert and in no distress.  Patient with moderate anterior chest, and epigastric and left-sided chest discomfort.    ENT: There is a normal pharynx with no acute erythema or exudate and oral mucosa is moist.  Nose is clear with no drainage.  Tympanic membranes intact and nonerythemic    Respiratory: Patient is clear to auscultation bilaterally with no wheezing or rhonchi.  Chest wall is tender to palpation.  There are no external lesions on the chest.  There is no crepitance    Cardiovascular: S1-S2 regular " rate and rhythm no murmurs rubs or gallops.    Abdomen: Soft,and  bowel sounds are normal in all 4 quadrants.  There is no rebound or guarding noted.  There is no abdominal distention or hepatosplenomegaly.  Positive epigastric tenderness to palpation no rebound.  Bowel sounds are normal in all 4 quadrants.    Genitourinary: Patient is voiding appropriately.    Integument: No acute lesions noted and color appears to be normal.    Santiago Coma Scale: Total score 15    Neurological: Patient is alert and oriented x4 and no acute findings noted.  Speech is fluent and cognition is normal.  No evidence of acute CVA.  Cranial nerves II through XII intact.  Patient with normal motor function as well as reflexes and sensation        RADIOLOGY/PROCEDURES      XR Chest 1 View   Final Result       1. No active disease in the chest.       This report was signed and finalized on 12/13/2024 1:28 PM by Tim Gomez.                 FUTURE APPOINTMENTS     Future Appointments   Date Time Provider Department Center   12/19/2024  2:15 PM Zaid Staples MD [facility]   12/23/2024  8:15 AM Felicia Allison APRN [facility]          EKG (reviewed and interpreted by me): Normal sinus rhythm. No acute ischemia. Heart rate 82 with no acute ST segment elevation or depression noted      HEART SCORE     Patient history  1      Slightly suspicious (0 points)  2   ECG       Nonspecific repolarization disturbance (1 point)    3   Patient age       Less than 45 (0 points)  4   Risk factors (Hypercholesterolemia, Hypertension, diabetes, smoking, obesity)       1 or 2 risk factors are present (1 point)    5   Troponin       Less than normal limit (0 points)     6     TOTAL RISK NUMBER: 2    The three risk categories are described below:  Heart score MACE risk Recommendation  0 - 3 Low (1.7%) Discharge can be an option.  4 - 6 Intermediate (20.3%) Clinical observation and further investigations.  7 - 10 High  (72.2%) Immediate invasive treatment        COURSE & MEDICAL DECISION MAKING       Patient's partial differential diagnosis can include:    Gastritis, esophagitis, esophageal spasm, GERD, unstable angina, angina, non-STEMI, arrhythmia, pneumothorax, pulmonary embolism, electrolyte abnormality,  Prinzmetal angina, costochondritis pleurisy, pleural effusion, pulmonary edema, panic attack, chest spasms, and others      Troponin negative x 2.  Patient's exam makes me concerned for gastritis esophagitis specially following a biopsy of the stomach.  Using oral steroids such as prednisone following a gastric biopsy will cause the gastritis and irritation.  Patient will need to be on Carafate as well as continuing her Protonix.  Advised the patient to avoid acidic foods and drinks.    Patient has an upcoming appointment with ENT on December 18.  Patient can be reevaluated there.  Otherwise educated for foods to avoid.    Patient's level of risk: Low        CRITICAL CARE    CRITICAL CARE: No    CRITICAL CARE TIME: None      Recent Results (from the past 24 hours)   ECG 12 Lead Chest Pain    Collection Time: 12/13/24 12:37 PM   Result Value Ref Range    QT Interval 374 ms    QTC Interval 445 ms   Comprehensive Metabolic Panel    Collection Time: 12/13/24  1:28 PM    Specimen: Blood   Result Value Ref Range    Glucose 94 65 - 99 mg/dL    BUN 18 6 - 20 mg/dL    Creatinine 0.67 0.57 - 1.00 mg/dL    Sodium 139 136 - 145 mmol/L    Potassium 3.4 (L) 3.5 - 5.2 mmol/L    Chloride 100 98 - 107 mmol/L    CO2 26.0 22.0 - 29.0 mmol/L    Calcium 9.4 8.6 - 10.5 mg/dL    Total Protein 6.9 6.0 - 8.5 g/dL    Albumin 4.2 3.5 - 5.2 g/dL    ALT (SGPT) 16 1 - 33 U/L    AST (SGOT) 17 1 - 32 U/L    Alkaline Phosphatase 70 39 - 117 U/L    Total Bilirubin 0.4 0.0 - 1.2 mg/dL    Globulin 2.7 gm/dL    A/G Ratio 1.6 g/dL    BUN/Creatinine Ratio 26.9 (H) 7.0 - 25.0    Anion Gap 13.0 5.0 - 15.0 mmol/L    eGFR 111.4 >60.0 mL/min/1.73   High Sensitivity  Troponin T    Collection Time: 12/13/24  1:28 PM    Specimen: Blood   Result Value Ref Range    HS Troponin T <6 <14 ng/L   Green Top (Gel)    Collection Time: 12/13/24  1:28 PM   Result Value Ref Range    Extra Tube Hold for add-ons.    Lavender Top    Collection Time: 12/13/24  1:28 PM   Result Value Ref Range    Extra Tube hold for add-on    Red Top    Collection Time: 12/13/24  1:28 PM   Result Value Ref Range    Extra Tube Hold for add-ons.    Light Blue Top    Collection Time: 12/13/24  1:28 PM   Result Value Ref Range    Extra Tube Hold for add-ons.    CBC Auto Differential    Collection Time: 12/13/24  1:28 PM    Specimen: Blood   Result Value Ref Range    WBC 7.53 3.40 - 10.80 10*3/mm3    RBC 4.37 3.77 - 5.28 10*6/mm3    Hemoglobin 12.8 12.0 - 15.9 g/dL    Hematocrit 38.0 34.0 - 46.6 %    MCV 87.0 79.0 - 97.0 fL    MCH 29.3 26.6 - 33.0 pg    MCHC 33.7 31.5 - 35.7 g/dL    RDW 12.1 (L) 12.3 - 15.4 %    RDW-SD 39.0 37.0 - 54.0 fl    MPV 9.7 6.0 - 12.0 fL    Platelets 307 140 - 450 10*3/mm3    Neutrophil % 61.5 42.7 - 76.0 %    Lymphocyte % 30.5 19.6 - 45.3 %    Monocyte % 6.1 5.0 - 12.0 %    Eosinophil % 1.2 0.3 - 6.2 %    Basophil % 0.4 0.0 - 1.5 %    Immature Grans % 0.3 0.0 - 0.5 %    Neutrophils, Absolute 4.63 1.70 - 7.00 10*3/mm3    Lymphocytes, Absolute 2.30 0.70 - 3.10 10*3/mm3    Monocytes, Absolute 0.46 0.10 - 0.90 10*3/mm3    Eosinophils, Absolute 0.09 0.00 - 0.40 10*3/mm3    Basophils, Absolute 0.03 0.00 - 0.20 10*3/mm3    Immature Grans, Absolute 0.02 0.00 - 0.05 10*3/mm3    nRBC 0.0 0.0 - 0.2 /100 WBC   D-dimer, Quantitative    Collection Time: 12/13/24  1:28 PM    Specimen: Blood   Result Value Ref Range    D-Dimer, Quantitative 0.43 0.00 - 0.50 MCGFEU/mL   ECG 12 Lead ED Triage Standing Order; Chest Pain    Collection Time: 12/13/24  2:28 PM   Result Value Ref Range    QT Interval 398 ms    QTC Interval 464 ms   High Sensitivity Troponin T 1Hr    Collection Time: 12/13/24  3:07 PM    Specimen:  Blood   Result Value Ref Range    HS Troponin T <6 <14 ng/L    Troponin T Delta           Old charts were reviewed per [facility] EMR.  Pertinent details are summarized above.  All laboratory, radiologic, and EKG studies that were performed in the Emergency Department were a necessary part of the evaluation needed to exclude unstable or  emergent medical conditions.     Patient was hemodynamically and neurologically stable in the ED.   Pertinent studies were reviewed as above.     The patient received:  Medications   sodium chloride 0.9 % flush 10 mL (has no administration in time range)   aspirin chewable tablet 324 mg (has no administration in time range)   aluminum-magnesium hydroxide-simethicone (MAALOX MAX) 400-400-40 MG/5ML suspension 15 mL (has no administration in time range)   Lidocaine Viscous HCl (XYLOCAINE) 2 % solution 15 mL (has no administration in time range)            ED Disposition       ED Disposition   Discharge    Condition   Stable    Comment   --                 Dragon disclaimer:  Part of this note may be an electronic transcription/translation of spoken language to printed text using the Dragon Dictation System.    I have reviewed the patient’s prescription history via a prescription monitoring program.  This information is consistent with my knowledge of the patient’s controlled substance use history.    Patient evaluated during Coronavirus Pandemic. Isolation practices followed according to [facility] policy.     FINAL IMPRESSION   Diagnosis Plan   1. Other acute gastritis without hemorrhage        2. Esophagitis              MD Franki Lee Jr, Thomas Mark Jr., MD  12/13/24 3047

## 2024-12-16 DIAGNOSIS — E66.811 CLASS 1 OBESITY WITHOUT SERIOUS COMORBIDITY WITH BODY MASS INDEX (BMI) OF 31.0 TO 31.9 IN ADULT, UNSPECIFIED OBESITY TYPE: ICD-10-CM

## 2024-12-16 RX ORDER — SEMAGLUTIDE 0.25 MG/.5ML
0.25 INJECTION, SOLUTION SUBCUTANEOUS WEEKLY
Qty: 2 ML | Refills: 0 | Status: SHIPPED | OUTPATIENT
Start: 2024-12-16

## 2024-12-18 DIAGNOSIS — E66.811 CLASS 1 OBESITY WITHOUT SERIOUS COMORBIDITY WITH BODY MASS INDEX (BMI) OF 31.0 TO 31.9 IN ADULT, UNSPECIFIED OBESITY TYPE: ICD-10-CM

## 2024-12-18 RX ORDER — SEMAGLUTIDE 0.25 MG/.5ML
INJECTION, SOLUTION SUBCUTANEOUS
OUTPATIENT
Start: 2024-12-18

## 2024-12-18 NOTE — PROGRESS NOTES
YOB: 1980  Location: [facility]  Location Address: [address] [place] [address], [facility], [address] [place] [address]  Location Phone: [phone]    Chief Complaint   Patient presents with    Hoarse    Chronic throat clearing       History of Present Illness  Gladys Mcmillan is a 44 y.o. female.      History of Present Illness  The patient presents for evaluation of hoarseness.    She has been experiencing persistent hoarseness since the weekend preceding Thanks, which she attributes to chronic throat clearing that has been a part of her life for approximately 15 to 20 years. This symptom occasionally intensifies, leading to an increase in the frequency of throat clearing. The weekend before Thanks, the symptom escalated to severe coughing, prompting a visit to an internal medicine clinic and subsequent referral to the emergency room due to her inability to control the cough and catch her breathe. She has had an endoscopy revealed esophagitis and GERD. .She has been on Protonix 40 mg once daily in the morning, which has improved her throat clearing some but not her hoarseness. She has only taken this for 3-4 weeks.She vapes daily.    She also admits dysphagia worse with pills. This started 3 days ago. She has had an esophagram but not a dysphagia study.       Results          Past Medical History:   Diagnosis Date    ADD (attention deficit disorder)     Anxiety     Fibromyalgia, primary     Possibly?    GERD (gastroesophageal reflux disease)     Headache     HL (hearing loss)     Hypertension [id number]    Hypertension last spring    Irritable bowel syndrome     Kidney stone     Just once.    Low back pain     Neuromuscular disorder     Tingling and cramping daily    Osteopenia     Urinary tract infection     UTI a couple times in the past    Visual impairment     Vitamin B 12 deficiency        Past Surgical History:   Procedure Laterality Date    BREAST BIOPSY Right     benign    CHOLECYSTECTOMY       COLONOSCOPY  03/18/2016    Hemorrhoids otherwise normal exam repeat in 10 years    COLONOSCOPY N/A 05/21/2021    Dr. sargent- One 6 mm hyperplastic  polyp at 70 cm proximal to the anus, - One 3 mm polyp at 40 cm proximal to the anus,Superficial fragment of benign colonic mucosa demonstrating mild glandular hyperplastic changes-    ENDOSCOPY      ENDOSCOPY N/A 12/3/2024    Procedure: ESOPHAGOGASTRODUODENOSCOPY WITH ANESTHESIA;  Surgeon: Brian Sargent MD;  Location: [facility] ENDOSCOPY;  Service: Gastroenterology;  Laterality: N/A;  pre op: GERD  post op: gerd  pcp: KEMAL Mackay MD    FRACTURE SURGERY      HIP FRACTURE SURGERY Left     HYSTERECTOMY      TVH/    TONSILLECTOMY AND ADENOIDECTOMY         Outpatient Medications Marked as Taking for the 12/19/24 encounter (Office Visit) with Zaid Staples MD   Medication Sig Dispense Refill    AMPHETAMINE-DEXTROAMPHETAMINE PO Take 40 mg by mouth 2 (Two) Times a Day.      busPIRone (BUSPAR) 15 MG tablet Take 1 tablet by mouth 3 (Three) Times a Day. 90 tablet 1    FLUoxetine (PROzac) 40 MG capsule Take 1 capsule by mouth Daily.      fluticasone (FLONASE) 50 MCG/ACT nasal spray Administer 2 sprays into the nostril(s) as directed by provider Daily. 15.8 mL 5    hyoscyamine (ANASPAZ,LEVSIN) 0.125 MG tablet Take 1 tablet by mouth 4 (Four) Times a Day With Meals & at Bedtime. 120 tablet 5    levocetirizine (XYZAL) 5 MG tablet Take 1 tablet by mouth Every Evening. 90 tablet 1    pantoprazole (PROTONIX) 40 MG EC tablet Take 1 tablet by mouth Daily. 30 tablet 11    pregabalin (LYRICA) 75 MG capsule Take 2 capsules by mouth 2 (Two) Times a Day. (Patient taking differently: Take 1 capsule by mouth 4 (Four) Times a Day.) 120 capsule 3    Semaglutide-Weight Management (Wegovy) 0.25 MG/0.5ML solution auto-injector Inject 0.5 mL under the skin into the appropriate area as directed 1 (One) Time Per Week. 2 mL 0    sucralfate (CARAFATE) 1 g tablet Take 1 tablet by mouth 4  (Four) Times a Day for 14 days. 56 tablet 0    tiZANidine (ZANAFLEX) 4 MG tablet TAKE 1/4-1 TABLET BY MOUTH EVERY 6 TO 8 HOURS         Azithromycin, Flavoring agent, Strawberry, and Butorphanol    Family History   Problem Relation Age of Onset    Thyroid disease Mother     Hyperlipidemia Mother     Miscarriages / Stillbirths Mother     Anxiety disorder Father     Arthritis Father         Seronegative Rheumatoid Athritis    No Known Problems Brother     Breast cancer Maternal Grandmother 60        Had right breast removed     Diabetes Maternal Grandmother             Arthritis Maternal Grandmother             Cancer Maternal Grandmother         Breast/Lukemia    Depression Maternal Grandmother             Hearing loss Maternal Grandmother             Heart disease Maternal Grandmother             Colon cancer Maternal Grandfather 60    Cancer Maternal Grandfather         Colon    COPD Maternal Grandfather             Hearing loss Maternal Grandfather             Hyperlipidemia Maternal Grandfather             Colon cancer Paternal Grandmother     Skin cancer Paternal Grandmother     Colon polyps Paternal Grandmother     Cancer Paternal Grandmother         Colon    Diabetes Paternal Grandfather 60            Cancer Paternal Grandfather 60    Hearing loss Maternal Aunt         Before 40    Ovarian cancer Neg Hx     Uterine cancer Neg Hx     Melanoma Neg Hx        Social History     Socioeconomic History    Marital status:    Tobacco Use    Smoking status: Every Day     Current packs/day: 0.50     Average packs/day: 0.5 packs/day for 24.6 years (12.3 ttl pk-yrs)     Types: Electronic Cigarette, Cigarettes     Start date: 2000    Smokeless tobacco: Current    Tobacco comments:     Vapes   Vaping Use    Vaping status: Every Day    Substances: Nicotine    Devices: Disposable   Substance and Sexual Activity    Alcohol use: No    Drug use: No     Sexual activity: Yes     Partners: Male     Birth control/protection: Vasectomy, Hysterectomy     Comment: partner had vasectomy        Review of Systems   Constitutional: Negative.    HENT:  Positive for voice change.         Admits frequent throat clearing       Vitals:    12/19/24 1504   BP: 154/90   Pulse: 94   Temp: 98.7 °F (37.1 °C)       Body mass index is 30.45 kg/m².    Objective     Physical Exam      Physical Exam  Vitals reviewed.   Constitutional:       Appearance: Normal appearance. She is obese.   HENT:      Head: Normocephalic.      Right Ear: External ear normal.      Left Ear: External ear normal.      Nose: Nose normal.      Mouth/Throat:      Lips: Pink.      Mouth: Mucous membranes are moist.      Comments: See endoscopy  Musculoskeletal:      Cervical back: Full passive range of motion without pain.   Lymphadenopathy:      Cervical: No cervical adenopathy.   Neurological:      Mental Status: She is alert.   Psychiatric:         Behavior: Behavior is cooperative.           Assessment & Plan   Diagnoses and all orders for this visit:    1. Laryngopharyngeal reflux (LPR) (Primary)    2. Hoarseness    3. Throat clearing    4. Dysphagia, unspecified type  -     FL Video Swallow With Speech Single Contrast; Future    Other orders  -     pantoprazole (PROTONIX) 40 MG EC tablet; Take 40 mg by mouth twice daily BEFORE meals  Dispense: 60 tablet; Refill: 3  -     fluticasone (FLONASE) 50 MCG/ACT nasal spray; Administer 2 sprays into the nostril(s) as directed by provider Daily.  Dispense: 16 g; Refill: 11  -     azelastine (ASTELIN) 0.1 % nasal spray; Administer 2 sprays into the nostril(s) as directed by provider 2 (Two) Times a Day. Use in each nostril as directed  Dispense: 30 mL; Refill: 11      * Surgery not found *  Orders Placed This Encounter   Procedures    FL Video Swallow With Speech Single Contrast     Standing Status:   Future     Standing Expiration Date:   12/19/2025     Order Specific  Question:   Reason for Exam:     Answer:   dysphagia     Order Specific Question:   Release to patient     Answer:   Routine Release [[id number]]     Order Specific Question:   Patient Pregnant     Answer:   Unknown     Assessment & Plan    Stop xyzal  Flonase and astelin  Increase protonix to twice daily before breakfast and dinner  Eliminate milk and dairy  Return for problems    Dr. Staples examined and discussed care with patient and agrees with treatment plan.     Return in about 3 months (around 3/19/2025) for Recheck.       Patient Instructions   Gastroesophageal Reflux Disease (Laryngopharyngeal Reflux), Adult  Gastroesophageal reflux disease (GERD) and/or Laryngopharyngeal Reflux, (LPR) happens when acid from your stomach flows up into the esophagus and/or throat and voicebox or larynx. When acid comes in contact with the these organs, the acid can cause soreness (inflammation). Over time, GERD may create small holes (ulcers) in the lining of the esophagus and may lead to the development of hoarseness, difficulty swallowing,   feeling of something stuck in the throat, increased mucous or drainage and even predispose to the development of malignancies, (cancer).    CAUSES   Increased body weight. This puts pressure on the stomach, making acid rise from the stomach into the esophagus.  Smoking. This increases acid production in the stomach.  Drinking alcohol. This causes decreased pressure in the lower esophageal sphincter (valve or ring of muscle between the esophagus and stomach), allowing acid from the stomach into the esophagus.  Late evening meals and a full stomach. This increases pressure and acid production in the stomach.  A malformed lower esophageal sphincter  Diet which can include avoidance of gluten and dairy products  Age  SYMPTOMS   Burning pain in the lower part of the mid-chest behind the breastbone and in the mid-stomach area. This may occur twice a week or more often.  Trouble  swallowing.  Sore throat.  Dry cough.  Asthma-like symptoms including chest tightness, shortness of breath, or wheezing.  Globus sensation-something stuck in the throat/fullness  Hoarseness  DIAGNOSIS   Your caregiver may be able to diagnose GERD based on your symptoms. In some cases, X-rays and other tests may be done to check for complications or to check the condition of your stomach and esophagus.  You may need to see another doctor.  TREATMENT   Over-the-counter or prescription medicines to help decrease acid production.   Dietary and behavioral modifications or changes may be also recommended.  HOME CARE INSTRUCTIONS   Change the factors that you can control. Ask your caregiver for guidance concerning weight loss, quitting smoking, and alcohol consumption.  Avoid foods and drinks that make your symptoms worse, and MAY include such as:  Caffeine or alcoholic drinks.  Chocolate.  Gluten containing foods  Dairy  Peppermint or mint flavorings.  Garlic and onions.  Spicy foods.  Citrus fruits, such as oranges, ginger, or limes.  Tomato-based foods such as sauce, chili, salsa, and pizza.  Fried and fatty foods.  Avoid lying down for the 3 hours prior to your bedtime or prior to taking a nap.  Eat small, frequent meals instead of large meals.  Wear loose-fitting clothing. Do not wear anything tight around your waist that causes pressure on your stomach.  Raise the head of your bed 6 to 8 inches with wood blocks to help you sleep. Extra pillows will not help.  Only take over-the-counter or prescription medicines for pain, discomfort, or fever as directed by your caregiver.  Do not take aspirin, ibuprofen, or other nonsteroidal anti-inflammatory drugs if possible (NSAIDs).  SEEK IMMEDIATE MEDICAL CARE IF:   You have pain in your arms, neck, jaw, teeth, or back.  Your pain increases or changes in intensity or duration.  You develop nausea, vomiting, or sweating (diaphoresis).  You develop shortness of breath, or you  faint.  Your vomit is green, yellow, black, or looks like coffee grounds or blood.  Your stool is red, bloody, or black.  These symptoms could be signs of other problems, such as heart disease, gastric bleeding, or esophageal bleeding.  MAKE SURE YOU:   Understand these instructions.  Will watch your condition.  Will get help right away if you are not doing well or get worse.     This information is not intended to replace advice given to you by your physician. Make sure you discuss any questions you have with your health care provider.     Modified by Zaid Staples MD, FACS 2016.  Document Released: 2006 Document Revised: 2016 Document Reviewed: 2016  [employer] Interactive Patient Education © Elsevier Inc.     CONTACT INFORMATION:  The main office phone number is [phone]. For emergencies after hours and on weekends, this number will convert over to our answering service and the on call provider will answer. Please try to keep non emergent phone calls/ questions to office hours 9am-5pm Monday through Friday.      [employer]  As an alternative, you can sign up and use the Epic MyChart system for more direct and quicker access for non emergent questions/ problems.  [employer] allows you to send messages to your doctor, view your test results, renew your prescriptions, schedule appointments, and more. To sign up, go to [employer]Visible and click on the Sign Up Now link in the New User? box. Enter your [employer] Activation Code exactly as it appears below along with the last four digits of your Social Security Number and your Date of Birth () to complete the sign-up process. If you do not sign up before the expiration date, you must request a new code.     [employer] Activation Code: Activation code not generated  Current [employer] Status: Active     If you have questions, you can email [email or web address] or call [phone] to talk to our [employer] staff. Remember,  MyChart is NOT to be used for urgent needs. For medical emergencies, dial 911.     IF YOU SMOKE OR USE TOBACCO PLEASE READ THE FOLLOWING:  Why is smoking bad for me?  Smoking increases the risk of heart disease, lung disease, vascular disease, stroke, and cancer. If you smoke, STOP!        IF YOU SMOKE OR USE TOBACCO PLEASE READ THE FOLLOWING:  Why is smoking bad for me?  Smoking increases the risk of heart disease, lung disease, vascular disease, stroke, and cancer. If you smoke, STOP!     For more information:  Quit Now [place]  1-800-QUIT-NOW  https://[place].quitlogix.org/en-US/     Patient or patient representative verbalized consent for the use of Ambient Listening during the visit with  Zaid Staples MD for chart documentation. 12/19/2024  15:20 CST

## 2024-12-19 ENCOUNTER — OFFICE VISIT (OUTPATIENT)
Dept: OTOLARYNGOLOGY | Facility: CLINIC | Age: 44
End: 2024-12-19
Payer: COMMERCIAL

## 2024-12-19 VITALS
HEIGHT: 65 IN | HEART RATE: 94 BPM | BODY MASS INDEX: 30.49 KG/M2 | SYSTOLIC BLOOD PRESSURE: 154 MMHG | DIASTOLIC BLOOD PRESSURE: 90 MMHG | WEIGHT: 183 LBS | TEMPERATURE: 98.7 F

## 2024-12-19 DIAGNOSIS — K21.9 LARYNGOPHARYNGEAL REFLUX (LPR): Primary | ICD-10-CM

## 2024-12-19 DIAGNOSIS — R13.10 DYSPHAGIA, UNSPECIFIED TYPE: ICD-10-CM

## 2024-12-19 DIAGNOSIS — R09.89 THROAT CLEARING: ICD-10-CM

## 2024-12-19 DIAGNOSIS — R49.0 HOARSENESS: ICD-10-CM

## 2024-12-19 RX ORDER — PANTOPRAZOLE SODIUM 40 MG/1
TABLET, DELAYED RELEASE ORAL
Qty: 60 TABLET | Refills: 3 | Status: SHIPPED | OUTPATIENT
Start: 2024-12-19

## 2024-12-19 RX ORDER — AZELASTINE 1 MG/ML
2 SPRAY, METERED NASAL 2 TIMES DAILY
Qty: 30 ML | Refills: 11 | Status: SHIPPED | OUTPATIENT
Start: 2024-12-19

## 2024-12-19 RX ORDER — FLUTICASONE PROPIONATE 50 MCG
2 SPRAY, SUSPENSION (ML) NASAL DAILY
Qty: 16 G | Refills: 11 | Status: SHIPPED | OUTPATIENT
Start: 2024-12-19

## 2024-12-19 NOTE — PATIENT INSTRUCTIONS
Gastroesophageal Reflux Disease (Laryngopharyngeal Reflux), Adult  Gastroesophageal reflux disease (GERD) and/or Laryngopharyngeal Reflux, (LPR) happens when acid from your stomach flows up into the esophagus and/or throat and voicebox or larynx. When acid comes in contact with the these organs, the acid can cause soreness (inflammation). Over time, GERD may create small holes (ulcers) in the lining of the esophagus and may lead to the development of hoarseness, difficulty swallowing,   feeling of something stuck in the throat, increased mucous or drainage and even predispose to the development of malignancies, (cancer).    CAUSES   Increased body weight. This puts pressure on the stomach, making acid rise from the stomach into the esophagus.  Smoking. This increases acid production in the stomach.  Drinking alcohol. This causes decreased pressure in the lower esophageal sphincter (valve or ring of muscle between the esophagus and stomach), allowing acid from the stomach into the esophagus.  Late evening meals and a full stomach. This increases pressure and acid production in the stomach.  A malformed lower esophageal sphincter  Diet which can include avoidance of gluten and dairy products  Age  SYMPTOMS   Burning pain in the lower part of the mid-chest behind the breastbone and in the mid-stomach area. This may occur twice a week or more often.  Trouble swallowing.  Sore throat.  Dry cough.  Asthma-like symptoms including chest tightness, shortness of breath, or wheezing.  Globus sensation-something stuck in the throat/fullness  Hoarseness  DIAGNOSIS   Your caregiver may be able to diagnose GERD based on your symptoms. In some cases, X-rays and other tests may be done to check for complications or to check the condition of your stomach and esophagus.  You may need to see another doctor.  TREATMENT   Over-the-counter or prescription medicines to help decrease acid production.   Dietary and behavioral modifications  or changes may be also recommended.  HOME CARE INSTRUCTIONS   Change the factors that you can control. Ask your caregiver for guidance concerning weight loss, quitting smoking, and alcohol consumption.  Avoid foods and drinks that make your symptoms worse, and MAY include such as:  Caffeine or alcoholic drinks.  Chocolate.  Gluten containing foods  Dairy  Peppermint or mint flavorings.  Garlic and onions.  Spicy foods.  Citrus fruits, such as oranges, ginger, or limes.  Tomato-based foods such as sauce, chili, salsa, and pizza.  Fried and fatty foods.  Avoid lying down for the 3 hours prior to your bedtime or prior to taking a nap.  Eat small, frequent meals instead of large meals.  Wear loose-fitting clothing. Do not wear anything tight around your waist that causes pressure on your stomach.  Raise the head of your bed 6 to 8 inches with wood blocks to help you sleep. Extra pillows will not help.  Only take over-the-counter or prescription medicines for pain, discomfort, or fever as directed by your caregiver.  Do not take aspirin, ibuprofen, or other nonsteroidal anti-inflammatory drugs if possible (NSAIDs).  SEEK IMMEDIATE MEDICAL CARE IF:   You have pain in your arms, neck, jaw, teeth, or back.  Your pain increases or changes in intensity or duration.  You develop nausea, vomiting, or sweating (diaphoresis).  You develop shortness of breath, or you faint.  Your vomit is green, yellow, black, or looks like coffee grounds or blood.  Your stool is red, bloody, or black.  These symptoms could be signs of other problems, such as heart disease, gastric bleeding, or esophageal bleeding.  MAKE SURE YOU:   Understand these instructions.  Will watch your condition.  Will get help right away if you are not doing well or get worse.     This information is not intended to replace advice given to you by your physician. Make sure you discuss any questions you have with your health care provider.     Modified by Zaid Staples,  MD, TERENCE 2016.  Document Released: 2006 Document Revised: 2016 Document Reviewed: 2016  [employer] Interactive Patient Education  Elsevier Inc.     CONTACT INFORMATION:  The main office phone number is [phone]. For emergencies after hours and on weekends, this number will convert over to our answering service and the on call provider will answer. Please try to keep non emergent phone calls/ questions to office hours 9am-5pm Monday through Friday.      [employer]  As an alternative, you can sign up and use the Epic MyChart system for more direct and quicker access for non emergent questions/ problems.  [employer] allows you to send messages to your doctor, view your test results, renew your prescriptions, schedule appointments, and more. To sign up, go to [employer] and click on the Sign Up Now link in the New User? box. Enter your [employer] Activation Code exactly as it appears below along with the last four digits of your Social Security Number and your Date of Birth () to complete the sign-up process. If you do not sign up before the expiration date, you must request a new code.     [employer] Activation Code: Activation code not generated  Current [employer] Status: Active     If you have questions, you can email [email or web address] or call [phone] to talk to our [employer] staff. Remember, [employer] is NOT to be used for urgent needs. For medical emergencies, dial 911.     IF YOU SMOKE OR USE TOBACCO PLEASE READ THE FOLLOWING:  Why is smoking bad for me?  Smoking increases the risk of heart disease, lung disease, vascular disease, stroke, and cancer. If you smoke, STOP!        IF YOU SMOKE OR USE TOBACCO PLEASE READ THE FOLLOWING:  Why is smoking bad for me?  Smoking increases the risk of heart disease, lung disease, vascular disease, stroke, and cancer. If you smoke, STOP!     For more information:  Quit Now  [place]  1-800-QUIT-NOW  https://[place].quitlogix.org/en-US/

## 2024-12-19 NOTE — PROGRESS NOTES
OPERATIVE NOTE:  Gladys Mcmillan    DATE OF PROCEDURE: 12/19/2024    PROCEDURE:   Flexible Fiberoptic Laryngoscopy    ANESTHESIA:  None    REASON FOR PROCEDURE:  Procedure was recommended for persistant hoarseness and globus sensation  Risks, benefits and alternatives were discussed.      DETAILS of OPERATION:  The patient was seated in the exam chair.  A flexible fiberoptic laryngoscopy was performed through the oral cavity.  The scope was introduced into the oral cavity and directed to the level of the glottis, examining the structures of the oropharynx, base of tongue, vallecula, supraglottic larynx, glottic larynx, and hypopharynx.      FINDINGS:  Mucosal surfaces:   The mucosal surfaces demonstrated normal mucosa surfaces with moderate inflammation    Base of tongue:  The base of tongue was found to have mass or lesion and only mild lymphoid hyperplasia  Epiglottis:  The epiglottis was found to have no mass or lesion.    Aryepiglottic fold:  The AE folds were found to have no mass or lesion.    False Vocal Fold:  The false cords were found to have no mass or lesion.    True Vocal Cord:  The true vocal cords were found to have no mass or lesion. Both true vocal cords adduct and abduct normally    Arytenoid:   The arytenoids were found to have moderate inter-arytenoid edema with erythema.    Hypopharynx:  The hypopharynx was found to have no mass or lesion.    The patient tolerated procedure well.

## 2024-12-23 LAB
QT INTERVAL: 374 MS
QT INTERVAL: 398 MS
QTC INTERVAL: 445 MS
QTC INTERVAL: 464 MS

## 2024-12-27 ENCOUNTER — OFFICE VISIT (OUTPATIENT)
Dept: INTERNAL MEDICINE | Facility: CLINIC | Age: 44
End: 2024-12-27
Payer: COMMERCIAL

## 2024-12-27 ENCOUNTER — LAB (OUTPATIENT)
Dept: LAB | Facility: HOSPITAL | Age: 44
End: 2024-12-27
Payer: COMMERCIAL

## 2024-12-27 VITALS
HEART RATE: 97 BPM | DIASTOLIC BLOOD PRESSURE: 80 MMHG | BODY MASS INDEX: 30.62 KG/M2 | TEMPERATURE: 98.2 F | OXYGEN SATURATION: 98 % | WEIGHT: 184 LBS | SYSTOLIC BLOOD PRESSURE: 132 MMHG

## 2024-12-27 DIAGNOSIS — K21.00 GASTROESOPHAGEAL REFLUX DISEASE WITH ESOPHAGITIS WITHOUT HEMORRHAGE: ICD-10-CM

## 2024-12-27 DIAGNOSIS — Z00.00 ENCOUNTER FOR PREVENTIVE CARE: Primary | ICD-10-CM

## 2024-12-27 DIAGNOSIS — Z72.0 NICOTINE USE: ICD-10-CM

## 2024-12-27 DIAGNOSIS — Z00.00 ENCOUNTER FOR PREVENTIVE CARE: ICD-10-CM

## 2024-12-27 DIAGNOSIS — M79.18 MYOFASCIAL PAIN SYNDROME: ICD-10-CM

## 2024-12-27 DIAGNOSIS — Z13.6 HYPERTENSION SCREEN: ICD-10-CM

## 2024-12-27 DIAGNOSIS — Z13.31 DEPRESSION SCREEN: ICD-10-CM

## 2024-12-27 DIAGNOSIS — Z11.59 NEED FOR HEPATITIS C SCREENING TEST: ICD-10-CM

## 2024-12-27 DIAGNOSIS — R94.31 ABNORMAL EKG: ICD-10-CM

## 2024-12-27 DIAGNOSIS — Z82.49 FAMILY HISTORY OF HEART DISEASE: ICD-10-CM

## 2024-12-27 DIAGNOSIS — Z78.9 ELECTRONIC CIGARETTE USE: ICD-10-CM

## 2024-12-27 LAB
AMPHET+METHAMPHET UR QL: POSITIVE
AMPHETAMINES UR QL: NEGATIVE
BARBITURATES UR QL SCN: NEGATIVE
BENZODIAZ UR QL SCN: POSITIVE
BUPRENORPHINE SERPL-MCNC: NEGATIVE NG/ML
CANNABINOIDS SERPL QL: NEGATIVE
CHOLEST SERPL-MCNC: 228 MG/DL (ref 0–200)
COCAINE UR QL: NEGATIVE
FENTANYL UR-MCNC: NEGATIVE NG/ML
HBA1C MFR BLD: 5.5 % (ref 4.8–5.6)
HCV AB SER QL: NORMAL
HDLC SERPL-MCNC: 85 MG/DL (ref 40–60)
LDLC SERPL CALC-MCNC: 126 MG/DL (ref 0–100)
LDLC/HDLC SERPL: 1.45 {RATIO}
METHADONE UR QL SCN: NEGATIVE
OPIATES UR QL: NEGATIVE
OXYCODONE UR QL SCN: NEGATIVE
PCP UR QL SCN: NEGATIVE
TRICYCLICS UR QL SCN: NEGATIVE
TRIGL SERPL-MCNC: 98 MG/DL (ref 0–150)
VLDLC SERPL-MCNC: 17 MG/DL (ref 5–40)

## 2024-12-27 PROCEDURE — 83036 HEMOGLOBIN GLYCOSYLATED A1C: CPT

## 2024-12-27 PROCEDURE — 99396 PREV VISIT EST AGE 40-64: CPT | Performed by: INTERNAL MEDICINE

## 2024-12-27 PROCEDURE — 80307 DRUG TEST PRSMV CHEM ANLYZR: CPT

## 2024-12-27 PROCEDURE — 36415 COLL VENOUS BLD VENIPUNCTURE: CPT

## 2024-12-27 PROCEDURE — 86803 HEPATITIS C AB TEST: CPT

## 2024-12-27 PROCEDURE — 90656 IIV3 VACC NO PRSV 0.5 ML IM: CPT | Performed by: INTERNAL MEDICINE

## 2024-12-27 PROCEDURE — 80061 LIPID PANEL: CPT

## 2024-12-27 PROCEDURE — 90471 IMMUNIZATION ADMIN: CPT | Performed by: INTERNAL MEDICINE

## 2024-12-27 RX ORDER — PREGABALIN 75 MG/1
150 CAPSULE ORAL 2 TIMES DAILY
Qty: 120 CAPSULE | Refills: 3 | Status: SHIPPED | OUTPATIENT
Start: 2024-12-27

## 2024-12-27 NOTE — PROGRESS NOTES
Chief Complaint   Patient presents with    Annual Exam         History:  Gladys Mcmillan is a 44 y.o. female who presents today for evaluation of the above problems.      HPI  History of Present Illness  The patient presents for a physical exam.    She reports an improvement in her overall health, with the exception of persistent chest discomfort, which has shown some amelioration. She experiences pain during deep inhalation.     She is current with her ophthalmological and dental examinations.     She has previously received the influenza vaccine but has not done so this year.     She was previously on sucralfate but discontinued it due to difficulty swallowing the medication. She has undergone a scope procedure by Dr. Staples, who diagnosed her with acid reflux impacting her vocal cords. She has been advised to increase her Protonix dosage to twice daily and discontinue Xyzal. She has been prescribed Astelin nasal spray as a replacement for the Xyzal, which she reports causes a burning sensation.  She reports a significant improvement in her symptoms since increasing Protonix to twice a day.    She has been diagnosed with fibromyalgia and is currently on Lyrica 150 mg twice daily, which she finds beneficial. She continues to see a psychiatrist, Dr. Parker, and has been prescribed Adderall, Prozac, and Klonopin as needed. She last took Klonopin prior to [place] and Adderall this morning.    SOCIAL HISTORY  The patient vapes nicotine every day. She reports no recreational drug use and occasional alcohol consumption.        Social History     Socioeconomic History    Marital status:    Tobacco Use    Smoking status: Every Day     Types: Electronic Cigarette     Start date: 5/4/2000    Smokeless tobacco: Current    Tobacco comments:     Vapes   Vaping Use    Vaping status: Every Day    Substances: Nicotine    Devices: Disposable   Substance and Sexual Activity    Alcohol use: No    Drug use: No    Sexual  activity: Yes     Partners: Male     Birth control/protection: Vasectomy, Hysterectomy     Comment: partner had vasectomy        PHQ-9 Depression Screening  Little interest or pleasure in doing things? Not at all   Feeling down, depressed, or hopeless? Not at all   PHQ-2 Total Score 0   Trouble falling or staying asleep, or sleeping too much?     Feeling tired or having little energy?     Poor appetite or overeating?     Feeling bad about yourself - or that you are a failure or have let yourself or your family down?     Trouble concentrating on things, such as reading the newspaper or watching television?     Moving or speaking so slowly that other people could have noticed? Or the opposite - being so fidgety or restless that you have been moving around a lot more than usual?     Thoughts that you would be better off dead, or of hurting yourself in some way?     PHQ-9 Total Score     If you checked off any problems, how difficult have these problems made it for you to do your work, take care of things at home, or get along with other people? Not difficult at all       PHQ-9 Total Score:      ROS:  Review of Systems   Constitutional: Negative.    HENT:  Positive for voice change (Improving).         Throat clearing has improved   Respiratory: Negative.     Cardiovascular: Negative.          Current Outpatient Medications:     AMPHETAMINE-DEXTROAMPHETAMINE PO, Take 40 mg by mouth 2 (Two) Times a Day., Disp: , Rfl:     azelastine (ASTELIN) 0.1 % nasal spray, Administer 2 sprays into the nostril(s) as directed by provider 2 (Two) Times a Day. Use in each nostril as directed, Disp: 30 mL, Rfl: 11    busPIRone (BUSPAR) 15 MG tablet, Take 1 tablet by mouth 3 (Three) Times a Day., Disp: 90 tablet, Rfl: 1    FLUoxetine (PROzac) 40 MG capsule, Take 1 capsule by mouth Daily., Disp: , Rfl:     fluticasone (FLONASE) 50 MCG/ACT nasal spray, Administer 2 sprays into the nostril(s) as directed by provider Daily., Disp: 15.8 mL,  Rfl: 5    fluticasone (FLONASE) 50 MCG/ACT nasal spray, Administer 2 sprays into the nostril(s) as directed by provider Daily., Disp: 16 g, Rfl: 11    pantoprazole (PROTONIX) 40 MG EC tablet, Take 1 tablet by mouth Daily., Disp: 30 tablet, Rfl: 11    pantoprazole (PROTONIX) 40 MG EC tablet, Take 40 mg by mouth twice daily BEFORE meals, Disp: 60 tablet, Rfl: 3    pregabalin (LYRICA) 75 MG capsule, Take 2 capsules by mouth 2 (Two) Times a Day., Disp: 120 capsule, Rfl: 3    Semaglutide-Weight Management (Wegovy) 0.25 MG/0.5ML solution auto-injector, Inject 0.5 mL under the skin into the appropriate area as directed 1 (One) Time Per Week., Disp: 2 mL, Rfl: 0    EPINEPHrine (EPIPEN) 0.3 MG/0.3ML solution auto-injector injection, , Disp: , Rfl:     hyoscyamine (ANASPAZ,LEVSIN) 0.125 MG tablet, Take 1 tablet by mouth 4 (Four) Times a Day With Meals & at Bedtime. (Patient not taking: Reported on 12/27/2024), Disp: 120 tablet, Rfl: 5    Lab Results   Component Value Date    GLUCOSE 94 12/13/2024    BUN 18 12/13/2024    CREATININE 0.67 12/13/2024     12/13/2024    K 3.4 (L) 12/13/2024     12/13/2024    CALCIUM 9.4 12/13/2024    PROTEINTOT 6.9 12/13/2024    ALBUMIN 4.2 12/13/2024    ALT 16 12/13/2024    AST 17 12/13/2024    ALKPHOS 70 12/13/2024    BILITOT 0.4 12/13/2024    GLOB 2.7 12/13/2024    AGRATIO 1.6 12/13/2024    BCR 26.9 (H) 12/13/2024    ANIONGAP 13.0 12/13/2024    EGFR 111.4 12/13/2024       WBC   Date Value Ref Range Status   12/13/2024 7.53 3.40 - 10.80 10*3/mm3 Final   06/04/2018 5.5 4.8 - 10.8 K/uL Final     RBC   Date Value Ref Range Status   12/13/2024 4.37 3.77 - 5.28 10*6/mm3 Final   06/04/2018 4.42 4.20 - 5.40 M/uL Final     Hemoglobin   Date Value Ref Range Status   12/13/2024 12.8 12.0 - 15.9 g/dL Final   06/04/2018 13.4 12.0 - 16.0 g/dL Final     Hematocrit   Date Value Ref Range Status   12/13/2024 38.0 34.0 - 46.6 % Final   06/04/2018 41.2 37.0 - 47.0 % Final     MCV   Date Value Ref Range  Status   12/13/2024 87.0 79.0 - 97.0 fL Final   06/04/2018 93.2 81.0 - 99.0 fL Final     MCH   Date Value Ref Range Status   12/13/2024 29.3 26.6 - 33.0 pg Final   06/04/2018 30.3 27.0 - 31.0 pg Final     MCHC   Date Value Ref Range Status   12/13/2024 33.7 31.5 - 35.7 g/dL Final   06/04/2018 32.5 (L) 33.0 - 37.0 g/dL Final     RDW   Date Value Ref Range Status   12/13/2024 12.1 (L) 12.3 - 15.4 % Final   06/04/2018 11.8 11.5 - 14.5 % Final     RDW-SD   Date Value Ref Range Status   12/13/2024 39.0 37.0 - 54.0 fl Final     MPV   Date Value Ref Range Status   12/13/2024 9.7 6.0 - 12.0 fL Final   06/04/2018 10.2 9.4 - 12.3 fL Final     Platelets   Date Value Ref Range Status   12/13/2024 307 140 - 450 10*3/mm3 Final   06/04/2018 273 130 - 400 K/uL Final     Neutrophil Rel %   Date Value Ref Range Status   06/04/2018 71.7 (H) 50.0 - 65.0 % Final     Neutrophil %   Date Value Ref Range Status   12/13/2024 61.5 42.7 - 76.0 % Final     Lymphocyte Rel %   Date Value Ref Range Status   06/04/2018 20.0 20.0 - 40.0 % Final     Lymphocyte %   Date Value Ref Range Status   12/13/2024 30.5 19.6 - 45.3 % Final     Monocyte Rel %   Date Value Ref Range Status   06/04/2018 6.0 0.0 - 10.0 % Final     Monocyte %   Date Value Ref Range Status   12/13/2024 6.1 5.0 - 12.0 % Final     Eosinophil Rel %   Date Value Ref Range Status   06/04/2018 1.1 0.0 - 5.0 % Final     Eosinophil %   Date Value Ref Range Status   12/13/2024 1.2 0.3 - 6.2 % Final     Basophil Rel %   Date Value Ref Range Status   06/04/2018 0.7 0.0 - 1.0 % Final     Basophil %   Date Value Ref Range Status   12/13/2024 0.4 0.0 - 1.5 % Final     Immature Grans %   Date Value Ref Range Status   12/13/2024 0.3 0.0 - 0.5 % Final     Neutrophils Absolute   Date Value Ref Range Status   06/04/2018 4.0 1.5 - 7.5 K/uL Final     Neutrophils, Absolute   Date Value Ref Range Status   12/13/2024 4.63 1.70 - 7.00 10*3/mm3 Final     Lymphocytes Absolute   Date Value Ref Range Status    06/04/2018 1.1 1.1 - 4.5 K/uL Final     Lymphocytes, Absolute   Date Value Ref Range Status   12/13/2024 2.30 0.70 - 3.10 10*3/mm3 Final     Monocytes Absolute   Date Value Ref Range Status   06/04/2018 0.30 0.00 - 0.90 K/uL Final     Monocytes, Absolute   Date Value Ref Range Status   12/13/2024 0.46 0.10 - 0.90 10*3/mm3 Final     Eosinophils Absolute   Date Value Ref Range Status   06/04/2018 0.10 0.00 - 0.60 K/uL Final     Eosinophils, Absolute   Date Value Ref Range Status   12/13/2024 0.09 0.00 - 0.40 10*3/mm3 Final     Basophils Absolute   Date Value Ref Range Status   06/04/2018 0.00 0.00 - 0.20 K/uL Final     Basophils, Absolute   Date Value Ref Range Status   12/13/2024 0.03 0.00 - 0.20 10*3/mm3 Final     Immature Grans, Absolute   Date Value Ref Range Status   12/13/2024 0.02 0.00 - 0.05 10*3/mm3 Final     nRBC   Date Value Ref Range Status   12/13/2024 0.0 0.0 - 0.2 /100 WBC Final         OBJECTIVE:  Visit Vitals  /80 (BP Location: Left arm, Patient Position: Sitting, Cuff Size: Adult)   Pulse 97   Temp 98.2 °F (36.8 °C) (Temporal)   Wt 83.5 kg (184 lb)   LMP 11/01/2023   SpO2 98%   BMI 30.62 kg/m²      Physical Exam  Vitals and nursing note reviewed.   Constitutional:       General: She is not in acute distress.     Appearance: Normal appearance. She is well-developed. She is not ill-appearing or toxic-appearing.   HENT:      Head: Normocephalic and atraumatic.      Right Ear: Tympanic membrane, ear canal and external ear normal. There is no impacted cerumen.      Left Ear: Tympanic membrane, ear canal and external ear normal. There is no impacted cerumen.   Eyes:      General: No scleral icterus.     Conjunctiva/sclera: Conjunctivae normal.      Pupils: Pupils are equal, round, and reactive to light.   Neck:      Thyroid: No thyromegaly.      Vascular: No JVD.   Cardiovascular:      Rate and Rhythm: Normal rate and regular rhythm.      Heart sounds: Normal heart sounds. No murmur heard.     No  friction rub. No gallop.   Pulmonary:      Effort: Pulmonary effort is normal. No respiratory distress.      Breath sounds: Normal breath sounds. No stridor. No wheezing, rhonchi or rales.   Abdominal:      General: Abdomen is flat. There is no distension.      Palpations: Abdomen is soft.      Tenderness: There is no abdominal tenderness.   Musculoskeletal:      Right lower leg: No edema.      Left lower leg: No edema.   Skin:     General: Skin is warm and dry.      Coloration: Skin is not jaundiced.   Neurological:      Mental Status: She is alert.      Cranial Nerves: No cranial nerve deficit.   Psychiatric:         Mood and Affect: Mood normal.         Behavior: Behavior normal.         Thought Content: Thought content normal.         Judgment: Judgment normal.       Physical Exam      Results    Assessment/Plan    Diagnoses and all orders for this visit:    1. Encounter for preventive care (Primary)  -     Hemoglobin A1c; Future  -     Hepatitis C Antibody; Future  -     Lipid Panel; Future    2. Depression screen    3. Hypertension screen    4. Myofascial pain syndrome  -     pregabalin (LYRICA) 75 MG capsule; Take 2 capsules by mouth 2 (Two) Times a Day.  Dispense: 120 capsule; Refill: 3  -     Urine Drug Screen - Urine, Clean Catch; Future    5. Need for hepatitis C screening test  -     Hepatitis C Antibody; Future    6. Nicotine use    7. Electronic cigarette use    8. Gastroesophageal reflux disease with esophagitis without hemorrhage    9. Family history of heart disease  -     Ambulatory Referral to Cardiology    10. Abnormal EKG  -     Ambulatory Referral to Cardiology    Other orders  -     Fluzone >6mos ([id number])      Assessment & Plan    Health maintenance.  She is due for lab work, including A1c, lipid panel, and one-time screening for hepatitis C. She is advised to receive the influenza vaccine today. A urine drug screen will be conducted today.    Fibromyalgia.  She reports that Lyrica helps  manage her fibromyalgia symptoms. A prescription for Lyrica 150 mg twice daily has been sent to her pharmacy.    Acid reflux.  She has been diagnosed with acid reflux affecting her vocal cords. She is currently taking Protonix twice a day as recommended by Dr. Staples.  Given the difficulty swallowing the Carafate a video swallow study has been ordered.  She is still undecided on whether she wants to have this done.    Nicotine dependence.  She reports daily vaping. She is advised to stop vaping.    Given the previous abnormal EKGs and her family history of coronary artery disease she would like referral to cardiology.  Referral has been placed today.    Counseling/Anticipatory Guidance Discussed: nutrition, physical activity, misuse of tobacco, alcohol and drugs, dental health, mental health, immunizations, and screenings     BMI is >= 30 and <35. (Class 1 Obesity). The following options were offered after discussion;: weight loss educational material (shared in after visit summary) and exercise counseling/recommendations        Return in about 6 months (around 6/27/2025), or if symptoms worsen or fail to improve, for Recheck with me.    Patient was given instructions and counseling regarding his/her condition or for health maintenance advice. Please see specific information pulled into the AVS if appropriate.     RONEY Mackay MD  14:02 CST  12/27/2024  Electronically signed     Patient or patient representative verbalized consent for the use of Ambient Listening during the visit with  KEMAL Mackay MD for chart documentation. 12/27/2024  14:42 CST

## 2025-01-27 DIAGNOSIS — M79.18 MYOFASCIAL PAIN SYNDROME: ICD-10-CM

## 2025-01-27 RX ORDER — PREGABALIN 75 MG/1
150 CAPSULE ORAL 2 TIMES DAILY
Qty: 120 CAPSULE | Refills: 3 | OUTPATIENT
Start: 2025-01-27

## 2025-01-27 NOTE — TELEPHONE ENCOUNTER
Rx Refill Note  Requested Prescriptions     Pending Prescriptions Disp Refills    pregabalin (LYRICA) 75 MG capsule 120 capsule 3     Sig: Take 2 capsules by mouth 2 (Two) Times a Day.      Last office visit with prescribing clinician: 12/27/2024   Last telemedicine visit with prescribing clinician: Visit date not found   Next office visit with prescribing clinician: 06/27/2025                        Would you like a call back once the refill request has been completed: [] Yes [] No    If the office needs to give you a call back, can they leave a voicemail: [] Yes [] No    SYLVIA Aguilar  01/27/25, 15:58 CST

## 2025-01-30 DIAGNOSIS — J30.2 SEASONAL ALLERGIES: ICD-10-CM

## 2025-01-30 RX ORDER — LEVOCETIRIZINE DIHYDROCHLORIDE 5 MG/1
5 TABLET, FILM COATED ORAL EVERY EVENING
Qty: 90 TABLET | Refills: 1 | OUTPATIENT
Start: 2025-01-30

## 2025-01-30 NOTE — TELEPHONE ENCOUNTER
Rx Refill Note  Requested Prescriptions     Pending Prescriptions Disp Refills    levocetirizine (XYZAL) 5 MG tablet [Pharmacy Med Name: LEVOCETIRIZINE 5 MG TABLET] 90 tablet 1     Sig: TAKE 1 TABLET BY MOUTH EVERY DAY IN THE EVENING      Last office visit with prescribing clinician: 10/28/2024   Last telemedicine visit with prescribing clinician: Visit date not found   Next office visit with prescribing clinician: 6/27/2025                         Would you like a call back once the refill request has been completed: [] Yes [] No    If the office needs to give you a call back, can they leave a voicemail: [] Yes [] No    Amando Zimmer MA  01/30/25, 08:40 CST

## 2025-02-10 RX ORDER — BUSPIRONE HYDROCHLORIDE 15 MG/1
15 TABLET ORAL 3 TIMES DAILY
Qty: 90 TABLET | Refills: 5 | Status: SHIPPED | OUTPATIENT
Start: 2025-02-10

## 2025-02-10 NOTE — TELEPHONE ENCOUNTER
Rx Refill Note  Requested Prescriptions     Pending Prescriptions Disp Refills    busPIRone (BUSPAR) 15 MG tablet [Pharmacy Med Name: BUSPIRONE HCL 15 MG TABLET] 90 tablet 1     Sig: TAKE 1 TABLET BY MOUTH THREE TIMES A DAY      Last office visit with prescribing clinician: 10/28/2024   Last telemedicine visit with prescribing clinician: Visit date not found   Next office visit with prescribing clinician: 6/27/2025                         Would you like a call back once the refill request has been completed: [] Yes [] No    If the office needs to give you a call back, can they leave a voicemail: [] Yes [] No    Amando Zimmer MA  02/10/25, 10:17 CST

## 2025-03-13 NOTE — PROGRESS NOTES
[facility]  [facility]  [address]  [facility] [id number] [phone]     Patient: Miesha Liriano   YOB: 1980   Date: 3/14/2025     Chief Complaint   Patient presents with    Hand Pain     Left hand        History of Present Illness  Miesha is a 44 y.o. female right-hand-dominant who presents today for my initial evaluation of a 2-3-month history of left dorsal radial hand/wrist pain with associated burning and electrical sensations.  She states that around this time she was bitten by her new puppy but was unsure if symptoms were related to this event.  She describes aggravation of symptoms with active wrist motion, especially with wrist flexion.  She is on Lyrica which she states that she takes for fibromyalgia.  She has had prior EMG and nerve conduction study from [facility] which was reviewed today, test was performed in November 2023 consistent with bilateral mild carpal tunnel.  She feels that new onset symptoms are different from what she experienced in the past.  She is also attempted bracing which she feels has aggravated her symptoms.      Past Medical History:   Diagnosis Date    ADHD (attention deficit hyperactivity disorder)       Past Surgical History:   Procedure Laterality Date    CHOLECYSTECTOMY      HYSTERECTOMY, TOTAL ABDOMINAL (CERVIX REMOVED)  12/22/2023    TONSILLECTOMY        Social History     Socioeconomic History    Marital status:      Spouse name: None    Number of children: None    Years of education: None    Highest education level: None   Tobacco Use    Smoking status: Never    Smokeless tobacco: Never   Vaping Use    Vaping status: Never Used   Substance and Sexual Activity    Alcohol use: Yes     Comment: SOCIAL    Drug use: No    Sexual activity: Yes     Partners: Male     Social Drivers of Health      Received from [facility]    Family and Community Support

## 2025-03-14 ENCOUNTER — OFFICE VISIT (OUTPATIENT)
Age: 45
End: 2025-03-14
Payer: COMMERCIAL

## 2025-03-14 VITALS — HEIGHT: 65 IN | BODY MASS INDEX: 33.32 KG/M2 | WEIGHT: 200 LBS

## 2025-03-14 DIAGNOSIS — G56.30 WARTENBERG SYNDROME: Primary | ICD-10-CM

## 2025-03-14 PROCEDURE — 99203 OFFICE O/P NEW LOW 30 MIN: CPT | Performed by: ORTHOPAEDIC SURGERY

## 2025-03-14 RX ORDER — AZELASTINE 1 MG/ML
2 SPRAY, METERED NASAL 2 TIMES DAILY
COMMUNITY
Start: 2024-12-19

## 2025-03-19 ENCOUNTER — OFFICE VISIT (OUTPATIENT)
Dept: OTOLARYNGOLOGY | Facility: CLINIC | Age: 45
End: 2025-03-19
Payer: COMMERCIAL

## 2025-03-19 VITALS — BODY MASS INDEX: 31.32 KG/M2 | HEIGHT: 65 IN | WEIGHT: 188 LBS

## 2025-03-19 DIAGNOSIS — K21.9 LARYNGOPHARYNGEAL REFLUX (LPR): ICD-10-CM

## 2025-03-19 DIAGNOSIS — R09.81 NASAL CONGESTION: ICD-10-CM

## 2025-03-19 DIAGNOSIS — J32.9 RECURRENT SINUS INFECTIONS: Primary | ICD-10-CM

## 2025-03-19 DIAGNOSIS — J34.89 SINUS PAIN: ICD-10-CM

## 2025-03-19 DIAGNOSIS — J34.89 SINUS PRESSURE: ICD-10-CM

## 2025-03-19 DIAGNOSIS — H91.91 DECREASED HEARING OF RIGHT EAR: ICD-10-CM

## 2025-03-19 DIAGNOSIS — R49.0 HOARSENESS: ICD-10-CM

## 2025-03-19 NOTE — PATIENT INSTRUCTIONS
Gastroesophageal Reflux Disease (Laryngopharyngeal Reflux), Adult  Gastroesophageal reflux disease (GERD) and/or Laryngopharyngeal Reflux, (LPR) happens when acid from your stomach flows up into the esophagus and/or throat and voicebox or larynx. When acid comes in contact with the these organs, the acid can cause soreness (inflammation). Over time, GERD may create small holes (ulcers) in the lining of the esophagus and may lead to the development of hoarseness, difficulty swallowing,   feeling of something stuck in the throat, increased mucous or drainage and even predispose to the development of malignancies, (cancer).    CAUSES   Increased body weight. This puts pressure on the stomach, making acid rise from the stomach into the esophagus.  Smoking. This increases acid production in the stomach.  Drinking alcohol. This causes decreased pressure in the lower esophageal sphincter (valve or ring of muscle between the esophagus and stomach), allowing acid from the stomach into the esophagus.  Late evening meals and a full stomach. This increases pressure and acid production in the stomach.  A malformed lower esophageal sphincter  Diet which can include avoidance of gluten and dairy products  Age  SYMPTOMS   Burning pain in the lower part of the mid-chest behind the breastbone and in the mid-stomach area. This may occur twice a week or more often.  Trouble swallowing.  Sore throat.  Dry cough.  Asthma-like symptoms including chest tightness, shortness of breath, or wheezing.  Globus sensation-something stuck in the throat/fullness  Hoarseness  DIAGNOSIS   Your caregiver may be able to diagnose GERD based on your symptoms. In some cases, X-rays and other tests may be done to check for complications or to check the condition of your stomach and esophagus.  You may need to see another doctor.  TREATMENT   Over-the-counter or prescription medicines to help decrease acid production.   Dietary and behavioral modifications  or changes may be also recommended.  HOME CARE INSTRUCTIONS   Change the factors that you can control. Ask your caregiver for guidance concerning weight loss, quitting smoking, and alcohol consumption.  Avoid foods and drinks that make your symptoms worse, and MAY include such as:  Caffeine or alcoholic drinks.  Chocolate.  Gluten containing foods  Dairy  Peppermint or mint flavorings.  Garlic and onions.  Spicy foods.  Citrus fruits, such as oranges, ginger, or limes.  Tomato-based foods such as sauce, chili, salsa, and pizza.  Fried and fatty foods.  Avoid lying down for the 3 hours prior to your bedtime or prior to taking a nap.  Eat small, frequent meals instead of large meals.  Wear loose-fitting clothing. Do not wear anything tight around your waist that causes pressure on your stomach.  Raise the head of your bed 6 to 8 inches with wood blocks to help you sleep. Extra pillows will not help.  Only take over-the-counter or prescription medicines for pain, discomfort, or fever as directed by your caregiver.  Do not take aspirin, ibuprofen, or other nonsteroidal anti-inflammatory drugs if possible (NSAIDs).  SEEK IMMEDIATE MEDICAL CARE IF:   You have pain in your arms, neck, jaw, teeth, or back.  Your pain increases or changes in intensity or duration.  You develop nausea, vomiting, or sweating (diaphoresis).  You develop shortness of breath, or you faint.  Your vomit is green, yellow, black, or looks like coffee grounds or blood.  Your stool is red, bloody, or black.  These symptoms could be signs of other problems, such as heart disease, gastric bleeding, or esophageal bleeding.  MAKE SURE YOU:   Understand these instructions.  Will watch your condition.  Will get help right away if you are not doing well or get worse.     This information is not intended to replace advice given to you by your physician. Make sure you discuss any questions you have with your health care provider.     Modified by Zaid Staples,  MD, [place] 2016.  Document Released: 2006 Document Revised: 2016 Document Reviewed: 2016  [employer] Patient Education  [employer]     CONTACT INFORMATION:  The main office phone number is [phone]. For emergencies after hours and on weekends, this number will convert over to our answering service and the on call provider will answer. Please try to keep non emergent phone calls/ questions to office hours 9am-5pm Monday through Friday.      [employer]  As an alternative, you can sign up and use the Epic MyChart system for more direct and quicker access for non emergent questions/ problems.  [employer] allows you to send messages to your doctor, view your test results, renew your prescriptions, schedule appointments, and more. To sign up, go to [employer] and click on the Sign Up Now link in the New User? box. Enter your [employer] Activation Code exactly as it appears below along with the last four digits of your Social Security Number and your Date of Birth () to complete the sign-up process. If you do not sign up before the expiration date, you must request a new code.     [employer] Activation Code: Activation code not generated  Current [employer] Status: Active     If you have questions, you can email [email or web address] or call [phone] to talk to our [employer] staff. Remember, [employer] is NOT to be used for urgent needs. For medical emergencies, dial 911.     IF YOU SMOKE OR USE TOBACCO PLEASE READ THE FOLLOWING:  Why is smoking bad for me?  Smoking increases the risk of heart disease, lung disease, vascular disease, stroke, and cancer. If you smoke, STOP!        IF YOU SMOKE OR USE TOBACCO PLEASE READ THE FOLLOWING:  Why is smoking bad for me?  Smoking increases the risk of heart disease, lung disease, vascular disease, stroke, and cancer. If you smoke, STOP!     For more information:  Quit Now  [place]  1-800-QUIT-NOW  https://[place].quitlogix.org/en-[place]/

## 2025-03-19 NOTE — PROGRESS NOTES
YOB: 1980  Location: [facility]  Location Address: [address] [place] [address], [facility], [address] [place] [address]  Location Phone: [phone]    Chief Complaint   Patient presents with    LPR    Sinus Problem     Has had recurrent sinus infections recently        History of Present Illness  Gladys Mcmillan is a 44 y.o. female.  Gladys Mcmillan is here for follow up of ENT complaints. The patient has had problems with persistent hoarseness, dysphagia, reflux, and throat clearing. At the last office visit, protonix was increased to twice daily before breakfast and dinner. She has had significant improvement to this.    Today, she admits recurrent sinus infections, sinus pain, sinus pressure, nasal congestion, and headaches. Symptoms are worse on the right side. She uses flonase and astelin daily. She has also tried xyzal in the past without relied. She has required several antibiotics and steroids.    She also admits decreased hearing of the right ear.      Past Medical History:   Diagnosis Date    ADD (attention deficit disorder)     Anxiety     Fibromyalgia, primary     Possibly?    GERD (gastroesophageal reflux disease)     Headache     HL (hearing loss)     Hypertension 6901-9300    Hypertension last spring    Irritable bowel syndrome     Kidney stone     Just once.    Low back pain     Migraine     Never diagnosed w/ migraines, but have headaches often.    Neuromuscular disorder     Tingling and cramping daily    Osteopenia     Ovarian cyst     PMS (premenstrual syndrome)     Preeclampsia     When I was pregnant with my 2nd child    Recurrent pregnancy loss, antepartum condition or complication     I had 2 miscarriages years ago    Rh incompatibility     Trauma  &     Concussion from a fall/ broken hip    Urinary tract infection     UTI a couple times in the past    Varicella     Visual impairment     Vitamin B 12 deficiency        Past Surgical History:   Procedure Laterality Date     BREAST BIOPSY Right     benign    CHOLECYSTECTOMY      COLONOSCOPY  03/18/2016    Hemorrhoids otherwise normal exam repeat in 10 years    COLONOSCOPY N/A 05/21/2021    Dr. sargent- One 6 mm hyperplastic  polyp at 70 cm proximal to the anus, - One 3 mm polyp at 40 cm proximal to the anus,Superficial fragment of benign colonic mucosa demonstrating mild glandular hyperplastic changes-    D & C WITH SUCTION  1998    ENDOSCOPY      ENDOSCOPY N/A 12/03/2024    Procedure: ESOPHAGOGASTRODUODENOSCOPY WITH ANESTHESIA;  Surgeon: Brian Sargent MD;  Location: [facility] ENDOSCOPY;  Service: Gastroenterology;  Laterality: N/A;  pre op: GERD  post op: gerd  pcp: KEMAL Mackay MD    FRACTURE SURGERY      HIP FRACTURE SURGERY Left     HYSTERECTOMY      TVH/    LAPAROSCOPIC ASSISTED VAGINAL HYSTERECTOMY SALPINGO OOPHORECTOMY      LAPAROSCOPIC CHOLECYSTECTOMY  2003    TONSILLECTOMY AND ADENOIDECTOMY      VAGINAL HYSTERECTOMY         Outpatient Medications Marked as Taking for the 3/19/25 encounter (Office Visit) with Mirza Graff APRN   Medication Sig Dispense Refill    AMPHETAMINE-DEXTROAMPHETAMINE PO Take 40 mg by mouth 2 (Two) Times a Day.      azelastine (ASTELIN) 0.1 % nasal spray Administer 2 sprays into the nostril(s) as directed by provider 2 (Two) Times a Day. Use in each nostril as directed 30 mL 11    busPIRone (BUSPAR) 15 MG tablet Take 1 tablet by mouth 3 (Three) Times a Day. 90 tablet 5    fluticasone (FLONASE) 50 MCG/ACT nasal spray Administer 2 sprays into the nostril(s) as directed by provider Daily. 15.8 mL 5    pantoprazole (PROTONIX) 40 MG EC tablet Take 40 mg by mouth twice daily BEFORE meals 60 tablet 3    pregabalin (LYRICA) 75 MG capsule Take 2 capsules by mouth 2 (Two) Times a Day. 120 capsule 3       Azithromycin, Flavoring agent (non-screening), Strawberry, and Butorphanol    Family History   Problem Relation Age of Onset    Thyroid disease Mother     Hyperlipidemia Mother     Miscarriages /  Stillbirths Mother     Anxiety disorder Father     Arthritis Father         Seronegative Rheumatoid Athritis    No Known Problems Brother     Breast cancer Maternal Grandmother 60            Diabetes Maternal Grandmother             Arthritis Maternal Grandmother             Cancer Maternal Grandmother         Breast/Lukemia    Depression Maternal Grandmother             Hearing loss Maternal Grandmother             Heart disease Maternal Grandmother             Colon cancer Maternal Grandfather 60            Cancer Maternal Grandfather         Colon    COPD Maternal Grandfather             Hearing loss Maternal Grandfather             Hyperlipidemia Maternal Grandfather             Colon cancer Paternal Grandmother             Skin cancer Paternal Grandmother     Colon polyps Paternal Grandmother     Cancer Paternal Grandmother         Colon    Melanoma Paternal Grandmother             Diabetes Paternal Grandfather 60            Cancer Paternal Grandfather 60    Hearing loss Maternal Aunt         Before 40    Ovarian cancer Neg Hx     Uterine cancer Neg Hx        Social History     Socioeconomic History    Marital status:    Tobacco Use    Smoking status: Every Day     Types: Electronic Cigarette     Start date: 2000    Smokeless tobacco: Current    Tobacco comments:     Vapes   Vaping Use    Vaping status: Every Day    Substances: Nicotine    Devices: Disposable   Substance and Sexual Activity    Alcohol use: No    Drug use: No    Sexual activity: Yes     Partners: Male     Birth control/protection: Vasectomy, Hysterectomy     Comment: partner had vasectomy        Review of Systems   Constitutional: Negative.    HENT:  Positive for congestion, hearing loss, sinus pressure and sinus pain.    Neurological: Negative.        There were no vitals filed for this visit.    Body mass index is 31.28  kg/m².    Objective     Physical Exam  Vitals reviewed.   Constitutional:       Appearance: Normal appearance. She is obese.   HENT:      Head: Normocephalic.      Right Ear: Tympanic membrane, ear canal and external ear normal. Decreased hearing noted.      Left Ear: Tympanic membrane, ear canal and external ear normal.      Nose: Nose normal.      Right Turbinates: Enlarged.      Left Turbinates: Enlarged.      Mouth/Throat:      Lips: Pink.      Mouth: Mucous membranes are moist.      Pharynx: Oropharynx is clear.   Musculoskeletal:      Cervical back: Full passive range of motion without pain.   Lymphadenopathy:      Cervical: No cervical adenopathy.   Neurological:      Mental Status: She is alert.   Psychiatric:         Behavior: Behavior is cooperative.         Assessment & Plan   Diagnoses and all orders for this visit:    1. Recurrent sinus infections (Primary)  -     CT Sinus Without Contrast; Future    2. Nasal congestion  -     CT Sinus Without Contrast; Future    3. Sinus pain  -     CT Sinus Without Contrast; Future    4. Sinus pressure  -     CT Sinus Without Contrast; Future    5. Laryngopharyngeal reflux (LPR)    6. Hoarseness    7. Decreased hearing of right ear      * Surgery not found *  Orders Placed This Encounter   Procedures    CT Sinus Without Contrast     Standing Status:   Future     Expected Date:   3/19/2025     Expiration Date:   3/19/2026     Scheduling Instructions:      [facility] -  image guidance     Release to patient:   Routine Release [[id number]]     Reason for Exam::   sinusitis, nasal congestion, sinus pain, sinus pressure     Patient Pregnant:   Unknown     CT sinus  Astelin and Flonase  Audio at follow up  Continue reflux medication and precautions  Return for problems    Return in about 6 weeks (around 4/30/2025) for Recheck, CT with Dr. Staples, with audio.       Patient Instructions   Gastroesophageal Reflux Disease (Laryngopharyngeal Reflux), Adult  Gastroesophageal reflux  disease (GERD) and/or Laryngopharyngeal Reflux, (LPR) happens when acid from your stomach flows up into the esophagus and/or throat and voicebox or larynx. When acid comes in contact with the these organs, the acid can cause soreness (inflammation). Over time, GERD may create small holes (ulcers) in the lining of the esophagus and may lead to the development of hoarseness, difficulty swallowing,   feeling of something stuck in the throat, increased mucous or drainage and even predispose to the development of malignancies, (cancer).    CAUSES   Increased body weight. This puts pressure on the stomach, making acid rise from the stomach into the esophagus.  Smoking. This increases acid production in the stomach.  Drinking alcohol. This causes decreased pressure in the lower esophageal sphincter (valve or ring of muscle between the esophagus and stomach), allowing acid from the stomach into the esophagus.  Late evening meals and a full stomach. This increases pressure and acid production in the stomach.  A malformed lower esophageal sphincter  Diet which can include avoidance of gluten and dairy products  Age  SYMPTOMS   Burning pain in the lower part of the mid-chest behind the breastbone and in the mid-stomach area. This may occur twice a week or more often.  Trouble swallowing.  Sore throat.  Dry cough.  Asthma-like symptoms including chest tightness, shortness of breath, or wheezing.  Globus sensation-something stuck in the throat/fullness  Hoarseness  DIAGNOSIS   Your caregiver may be able to diagnose GERD based on your symptoms. In some cases, X-rays and other tests may be done to check for complications or to check the condition of your stomach and esophagus.  You may need to see another doctor.  TREATMENT   Over-the-counter or prescription medicines to help decrease acid production.   Dietary and behavioral modifications or changes may be also recommended.  HOME CARE INSTRUCTIONS   Change the factors that you  can control. Ask your caregiver for guidance concerning weight loss, quitting smoking, and alcohol consumption.  Avoid foods and drinks that make your symptoms worse, and MAY include such as:  Caffeine or alcoholic drinks.  Chocolate.  Gluten containing foods  Dairy  Peppermint or mint flavorings.  Garlic and onions.  Spicy foods.  Citrus fruits, such as oranges, ginger, or limes.  Tomato-based foods such as sauce, chili, salsa, and pizza.  Fried and fatty foods.  Avoid lying down for the 3 hours prior to your bedtime or prior to taking a nap.  Eat small, frequent meals instead of large meals.  Wear loose-fitting clothing. Do not wear anything tight around your waist that causes pressure on your stomach.  Raise the head of your bed 6 to 8 inches with wood blocks to help you sleep. Extra pillows will not help.  Only take over-the-counter or prescription medicines for pain, discomfort, or fever as directed by your caregiver.  Do not take aspirin, ibuprofen, or other nonsteroidal anti-inflammatory drugs if possible (NSAIDs).  SEEK IMMEDIATE MEDICAL CARE IF:   You have pain in your arms, neck, jaw, teeth, or back.  Your pain increases or changes in intensity or duration.  You develop nausea, vomiting, or sweating (diaphoresis).  You develop shortness of breath, or you faint.  Your vomit is green, yellow, black, or looks like coffee grounds or blood.  Your stool is red, bloody, or black.  These symptoms could be signs of other problems, such as heart disease, gastric bleeding, or esophageal bleeding.  MAKE SURE YOU:   Understand these instructions.  Will watch your condition.  Will get help right away if you are not doing well or get worse.     This information is not intended to replace advice given to you by your physician. Make sure you discuss any questions you have with your health care provider.     Modified by Zaid Staples MD, FACS 9/8/2016.  Document Released: 09/27/2006 Document Revised: 01/08/2016 Document  Reviewed: 2016  [facility] Interactive Patient Education  [employer]     CONTACT INFORMATION:  The main office phone number is [phone]. For emergencies after hours and on weekends, this number will convert over to our answering service and the on call provider will answer. Please try to keep non emergent phone calls/ questions to office hours 9am-5pm Monday through Friday.      [employer]  As an alternative, you can sign up and use the Epic MyChart system for more direct and quicker access for non emergent questions/ problems.  [employer] allows you to send messages to your doctor, view your test results, renew your prescriptions, schedule appointments, and more. To sign up, go to [employer] and click on the Sign Up Now link in the New User? box. Enter your [employer] Activation Code exactly as it appears below along with the last four digits of your Social Security Number and your Date of Birth () to complete the sign-up process. If you do not sign up before the expiration date, you must request a new code.     [employer] Activation Code: Activation code not generated  Current Manicube Status: Active     If you have questions, you can email [email or web address] or call [phone] to talk to our [employer] staff. Remember, [employer] is NOT to be used for urgent needs. For medical emergencies, dial 911.     IF YOU SMOKE OR USE TOBACCO PLEASE READ THE FOLLOWING:  Why is smoking bad for me?  Smoking increases the risk of heart disease, lung disease, vascular disease, stroke, and cancer. If you smoke, STOP!        IF YOU SMOKE OR USE TOBACCO PLEASE READ THE FOLLOWING:  Why is smoking bad for me?  Smoking increases the risk of heart disease, lung disease, vascular disease, stroke, and cancer. If you smoke, STOP!     For more information:  Quit Now [place]  -QUIT-NOW  [email or web address]/en-[email or web address]

## 2025-03-27 ENCOUNTER — HOSPITAL ENCOUNTER (OUTPATIENT)
Dept: CT IMAGING | Facility: HOSPITAL | Age: 45
Discharge: HOME | End: 2025-03-27
Admitting: NURSE PRACTITIONER
Payer: COMMERCIAL

## 2025-03-27 DIAGNOSIS — J34.89 SINUS PAIN: ICD-10-CM

## 2025-03-27 DIAGNOSIS — R09.81 NASAL CONGESTION: ICD-10-CM

## 2025-03-27 DIAGNOSIS — J32.9 RECURRENT SINUS INFECTIONS: ICD-10-CM

## 2025-03-27 DIAGNOSIS — J34.89 SINUS PRESSURE: ICD-10-CM

## 2025-03-27 PROCEDURE — 70486 CT MAXILLOFACIAL W/O DYE: CPT

## 2025-03-28 ENCOUNTER — RESULTS FOLLOW-UP (OUTPATIENT)
Dept: OTOLARYNGOLOGY | Facility: CLINIC | Age: 45
End: 2025-03-28
Payer: COMMERCIAL

## 2025-03-31 NOTE — TELEPHONE ENCOUNTER
Patient notified of results and voiced understanding. Patient is concerned that she is still having increasing issues. Constant nose blowing and scabs in nose. Patient is wondering if there is anything we can give her until her appt on 5/1.

## 2025-04-03 RX ORDER — AMOXICILLIN 500 MG/1
500 CAPSULE ORAL 3 TIMES DAILY
Qty: 63 CAPSULE | Refills: 0 | Status: SHIPPED | OUTPATIENT
Start: 2025-04-03 | End: 2025-04-24

## 2025-04-22 RX ORDER — BUSPIRONE HYDROCHLORIDE 15 MG/1
15 TABLET ORAL 3 TIMES DAILY
Qty: 270 TABLET | Refills: 3 | Status: SHIPPED | OUTPATIENT
Start: 2025-04-22

## 2025-04-22 NOTE — TELEPHONE ENCOUNTER
[facility] pharmacy sent over a 90 day request for Buspirone hcl 15 mg tablet qty 270.  Medication pended to chart for review.

## 2025-05-01 ENCOUNTER — TELEPHONE (OUTPATIENT)
Dept: OTOLARYNGOLOGY | Facility: CLINIC | Age: 45
End: 2025-05-01

## 2025-05-01 NOTE — TELEPHONE ENCOUNTER
Caller: Gladys Mcmillan    Relationship:  Self    Best call back number: [phone] (home)       PATIENT CALLED REQUESTING TO CANCEL SAME DAY APPT.    Did the patient call AFTER the start time of their scheduled appointment?  []YES  [x]NO    Was the patient's appointment rescheduled? [x]YES  []NO    Any additional information:

## 2025-05-12 RX ORDER — PANTOPRAZOLE SODIUM 40 MG/1
40 TABLET, DELAYED RELEASE ORAL
Qty: 60 TABLET | Refills: 3 | Status: SHIPPED | OUTPATIENT
Start: 2025-05-12

## 2025-05-27 DIAGNOSIS — M79.18 MYOFASCIAL PAIN SYNDROME: ICD-10-CM

## 2025-05-28 RX ORDER — PREGABALIN 75 MG/1
150 CAPSULE ORAL 2 TIMES DAILY
Qty: 120 CAPSULE | Refills: 3 | Status: SHIPPED | OUTPATIENT
Start: 2025-05-28

## 2025-05-28 NOTE — TELEPHONE ENCOUNTER
Rx Refill Note  Requested Prescriptions     Pending Prescriptions Disp Refills    pregabalin (LYRICA) 75 MG capsule [Pharmacy Med Name: PREGABALIN 75 MG CAPSULE] 120 capsule 3     Sig: TAKE 2 CAPSULES BY MOUTH 2 TIMES A DAY.      Last office visit with prescribing clinician: 12/27/2024   Last telemedicine visit with prescribing clinician: Visit date not found   Next office visit with prescribing clinician: Visit date not found                         Would you like a call back once the refill request has been completed: [] Yes [] No    If the office needs to give you a call back, can they leave a voicemail: [] Yes [] No    SYLVIA Aguilar  05/28/25, 09:54 CDT    Medication alst filled 12/27/24, qty 120, 3 refills.

## 2025-07-07 ENCOUNTER — LAB (OUTPATIENT)
Dept: LAB | Facility: HOSPITAL | Age: 45
End: 2025-07-07
Payer: COMMERCIAL

## 2025-07-07 ENCOUNTER — OFFICE VISIT (OUTPATIENT)
Dept: INTERNAL MEDICINE | Facility: CLINIC | Age: 45
End: 2025-07-07
Payer: COMMERCIAL

## 2025-07-07 VITALS
SYSTOLIC BLOOD PRESSURE: 136 MMHG | HEART RATE: 78 BPM | OXYGEN SATURATION: 98 % | BODY MASS INDEX: 30.49 KG/M2 | DIASTOLIC BLOOD PRESSURE: 98 MMHG | HEIGHT: 65 IN | TEMPERATURE: 97.8 F | WEIGHT: 183 LBS

## 2025-07-07 DIAGNOSIS — R76.8 POSITIVE ANA (ANTINUCLEAR ANTIBODY): ICD-10-CM

## 2025-07-07 DIAGNOSIS — F41.9 ANXIETY: ICD-10-CM

## 2025-07-07 DIAGNOSIS — F90.0 ATTENTION DEFICIT HYPERACTIVITY DISORDER (ADHD), PREDOMINANTLY INATTENTIVE TYPE: ICD-10-CM

## 2025-07-07 DIAGNOSIS — I10 ESSENTIAL HYPERTENSION: ICD-10-CM

## 2025-07-07 DIAGNOSIS — M25.50 ARTHRALGIA, UNSPECIFIED JOINT: ICD-10-CM

## 2025-07-07 DIAGNOSIS — R23.3 EASY BRUISABILITY: ICD-10-CM

## 2025-07-07 DIAGNOSIS — M79.18 MYOFASCIAL PAIN SYNDROME: Primary | ICD-10-CM

## 2025-07-07 LAB
ALBUMIN SERPL-MCNC: 4.6 G/DL (ref 3.5–5.2)
ALBUMIN/GLOB SERPL: 1.5 G/DL
ALP SERPL-CCNC: 68 U/L (ref 39–117)
ALT SERPL W P-5'-P-CCNC: 17 U/L (ref 1–33)
ANION GAP SERPL CALCULATED.3IONS-SCNC: 14 MMOL/L (ref 5–15)
AST SERPL-CCNC: 21 U/L (ref 1–32)
BASOPHILS # BLD AUTO: 0.04 10*3/MM3 (ref 0–0.2)
BASOPHILS NFR BLD AUTO: 0.7 % (ref 0–1.5)
BILIRUB SERPL-MCNC: 0.4 MG/DL (ref 0–1.2)
BUN SERPL-MCNC: 19.7 MG/DL (ref 6–20)
BUN/CREAT SERPL: 26.3 (ref 7–25)
CALCIUM SPEC-SCNC: 9.8 MG/DL (ref 8.6–10.5)
CHLORIDE SERPL-SCNC: 101 MMOL/L (ref 98–107)
CO2 SERPL-SCNC: 26 MMOL/L (ref 22–29)
CREAT SERPL-MCNC: 0.75 MG/DL (ref 0.57–1)
DEPRECATED RDW RBC AUTO: 40.4 FL (ref 37–54)
EGFRCR SERPLBLD CKD-EPI 2021: 100.8 ML/MIN/1.73
EOSINOPHIL # BLD AUTO: 0.05 10*3/MM3 (ref 0–0.4)
EOSINOPHIL NFR BLD AUTO: 0.9 % (ref 0.3–6.2)
ERYTHROCYTE [DISTWIDTH] IN BLOOD BY AUTOMATED COUNT: 12.4 % (ref 12.3–15.4)
GLOBULIN UR ELPH-MCNC: 3.1 GM/DL
GLUCOSE SERPL-MCNC: 88 MG/DL (ref 65–99)
HCT VFR BLD AUTO: 40.5 % (ref 34–46.6)
HGB BLD-MCNC: 13.3 G/DL (ref 12–15.9)
IMM GRANULOCYTES # BLD AUTO: 0.02 10*3/MM3 (ref 0–0.05)
IMM GRANULOCYTES NFR BLD AUTO: 0.4 % (ref 0–0.5)
INR PPP: 0.88 (ref 0.91–1.09)
LYMPHOCYTES # BLD AUTO: 1.28 10*3/MM3 (ref 0.7–3.1)
LYMPHOCYTES NFR BLD AUTO: 23.4 % (ref 19.6–45.3)
MCH RBC QN AUTO: 29.6 PG (ref 26.6–33)
MCHC RBC AUTO-ENTMCNC: 32.8 G/DL (ref 31.5–35.7)
MCV RBC AUTO: 90 FL (ref 79–97)
MONOCYTES # BLD AUTO: 0.36 10*3/MM3 (ref 0.1–0.9)
MONOCYTES NFR BLD AUTO: 6.6 % (ref 5–12)
NEUTROPHILS NFR BLD AUTO: 3.73 10*3/MM3 (ref 1.7–7)
NEUTROPHILS NFR BLD AUTO: 68 % (ref 42.7–76)
NRBC BLD AUTO-RTO: 0 /100 WBC (ref 0–0.2)
PLATELET # BLD AUTO: 312 10*3/MM3 (ref 140–450)
PMV BLD AUTO: 9.9 FL (ref 6–12)
POTASSIUM SERPL-SCNC: 3.2 MMOL/L (ref 3.5–5.2)
PROT SERPL-MCNC: 7.7 G/DL (ref 6–8.5)
PROTHROMBIN TIME: 12.4 SECONDS (ref 11.8–14.8)
RBC # BLD AUTO: 4.5 10*6/MM3 (ref 3.77–5.28)
SODIUM SERPL-SCNC: 141 MMOL/L (ref 136–145)
WBC NRBC COR # BLD AUTO: 5.48 10*3/MM3 (ref 3.4–10.8)

## 2025-07-07 PROCEDURE — 36415 COLL VENOUS BLD VENIPUNCTURE: CPT

## 2025-07-07 PROCEDURE — 85610 PROTHROMBIN TIME: CPT

## 2025-07-07 PROCEDURE — 80053 COMPREHEN METABOLIC PANEL: CPT

## 2025-07-07 PROCEDURE — 85025 COMPLETE CBC W/AUTO DIFF WBC: CPT

## 2025-07-07 PROCEDURE — 99214 OFFICE O/P EST MOD 30 MIN: CPT | Performed by: INTERNAL MEDICINE

## 2025-07-07 RX ORDER — CLONAZEPAM 0.5 MG/1
TABLET ORAL
COMMUNITY
Start: 2025-05-24

## 2025-07-07 NOTE — PROGRESS NOTES
Chief Complaint   Patient presents with    Joint Pain     Patient complains of bilateral knee pain         History:  Gladys Mcmillan is a 45 y.o. female who presents today for evaluation of the above problems.      Joint Pain  Primary Care Follow-Up  Conditions present: other    Side effects:  Dizziness, joint pain, nausea and visual  Treatment compliance:  All of the time  Treatment barriers:  No complaince problems  Exercise:  Never  Other:     Additional other condition information:  More frequent flare ups. They last longer.    History of Present Illness  The patient presents for a 6-month follow-up.    She has been experiencing elevated blood pressure readings at home, typically in the range of 134/84 to 141/94, with diastolic values often around 88 and systolic values in the 130s or 140s. She is not currently on any antihypertensive medications.    She reports feeling overmedicated and has been under the care of Roseline Corona, who has gradually increased her Adderall dosage to 40 mg twice daily. She also takes Prozac 60 mg once daily, BuSpar, and Lyrica (prescribed by this office for fibromyalgia). She expresses concern about potential side effects from long-term Adderall use, including possible liver damage, and requests liver function tests. She also reports yellowing of her eyes and urine, as well as easy bruising. Additionally, she experiences daily episodes of facial flushing, heat, nausea, and sweating, which she suspects may be related to her medication regimen.    She has been under significant stress due to recent life events, including her son's graduation, purchasing a house, moving back to [place], and the birth of a grandchild. She is seeking relief from her symptoms and is considering medical marijuana as an alternative treatment option.    She has been diagnosed with fibromyalgia and has noticed new symptoms over the past two weeks, including yellow blotches in her vision. An  ophthalmologist previously reassured her that these visual disturbances were normal. She has also consulted a rheumatologist for positive ALEXEI, who found no autoimmune disorders.    Her anxiety levels are high, and she struggles with focus, which she attributes to ADD rather than ADHD.      ROS:  Review of Systems   Musculoskeletal:  Positive for arthralgias.   Hematological:  Bruises/bleeds easily.         Current Outpatient Medications:     AMPHETAMINE-DEXTROAMPHETAMINE PO, Take 40 mg by mouth 2 (Two) Times a Day., Disp: , Rfl:     azelastine (ASTELIN) 0.1 % nasal spray, Administer 2 sprays into the nostril(s) as directed by provider 2 (Two) Times a Day. Use in each nostril as directed, Disp: 30 mL, Rfl: 11    busPIRone (BUSPAR) 15 MG tablet, Take 1 tablet by mouth 3 (Three) Times a Day., Disp: 270 tablet, Rfl: 3    clonazePAM (KlonoPIN) 0.5 MG tablet, TAKE 1-2 TABLET BY MOUTH DAILY AS NEEDED FOR ANXIETY, Disp: , Rfl:     EPINEPHrine (EPIPEN) 0.3 MG/0.3ML solution auto-injector injection, , Disp: , Rfl:     FLUoxetine (PROzac) 40 MG capsule, Take 20 mg by mouth 3 (Three) Times a Day., Disp: , Rfl:     fluticasone (FLONASE) 50 MCG/ACT nasal spray, Administer 2 sprays into the nostril(s) as directed by provider Daily., Disp: 16 g, Rfl: 11    pantoprazole (PROTONIX) 40 MG EC tablet, TAKE 1 TABLET BY MOUTH TWICE A DAY BEFORE MEALS, Disp: 60 tablet, Rfl: 3    pregabalin (LYRICA) 75 MG capsule, TAKE 2 CAPSULES BY MOUTH 2 TIMES A DAY., Disp: 120 capsule, Rfl: 3    Lab Results   Component Value Date    GLUCOSE 94 12/13/2024    BUN 18 12/13/2024    CREATININE 0.67 12/13/2024     12/13/2024    K 3.4 (L) 12/13/2024     12/13/2024    CALCIUM 9.4 12/13/2024    PROTEINTOT 6.9 12/13/2024    ALBUMIN 4.2 12/13/2024    ALT 16 12/13/2024    AST 17 12/13/2024    ALKPHOS 70 12/13/2024    BILITOT 0.4 12/13/2024    GLOB 2.7 12/13/2024    AGRATIO 1.6 12/13/2024    BCR 26.9 (H) 12/13/2024    ANIONGAP 13.0 12/13/2024    EGFR  111.4 12/13/2024       WBC   Date Value Ref Range Status   12/13/2024 7.53 3.40 - 10.80 10*3/mm3 Final   06/04/2018 5.5 4.8 - 10.8 K/uL Final     RBC   Date Value Ref Range Status   12/13/2024 4.37 3.77 - 5.28 10*6/mm3 Final   06/04/2018 4.42 4.20 - 5.40 M/uL Final     Hemoglobin   Date Value Ref Range Status   12/13/2024 12.8 12.0 - 15.9 g/dL Final   06/04/2018 13.4 12.0 - 16.0 g/dL Final     Hematocrit   Date Value Ref Range Status   12/13/2024 38.0 34.0 - 46.6 % Final   06/04/2018 41.2 37.0 - 47.0 % Final     MCV   Date Value Ref Range Status   12/13/2024 87.0 79.0 - 97.0 fL Final   06/04/2018 93.2 81.0 - 99.0 fL Final     MCH   Date Value Ref Range Status   12/13/2024 29.3 26.6 - 33.0 pg Final   06/04/2018 30.3 27.0 - 31.0 pg Final     MCHC   Date Value Ref Range Status   12/13/2024 33.7 31.5 - 35.7 g/dL Final   06/04/2018 32.5 (L) 33.0 - 37.0 g/dL Final     RDW   Date Value Ref Range Status   12/13/2024 12.1 (L) 12.3 - 15.4 % Final   06/04/2018 11.8 11.5 - 14.5 % Final     RDW-SD   Date Value Ref Range Status   12/13/2024 39.0 37.0 - 54.0 fl Final     MPV   Date Value Ref Range Status   12/13/2024 9.7 6.0 - 12.0 fL Final   06/04/2018 10.2 9.4 - 12.3 fL Final     Platelets   Date Value Ref Range Status   12/13/2024 307 140 - 450 10*3/mm3 Final   06/04/2018 273 130 - 400 K/uL Final     Neutrophil Rel %   Date Value Ref Range Status   06/04/2018 71.7 (H) 50.0 - 65.0 % Final     Neutrophil %   Date Value Ref Range Status   12/13/2024 61.5 42.7 - 76.0 % Final     Lymphocyte Rel %   Date Value Ref Range Status   06/04/2018 20.0 20.0 - 40.0 % Final     Lymphocyte %   Date Value Ref Range Status   12/13/2024 30.5 19.6 - 45.3 % Final     Monocyte Rel %   Date Value Ref Range Status   06/04/2018 6.0 0.0 - 10.0 % Final     Monocyte %   Date Value Ref Range Status   12/13/2024 6.1 5.0 - 12.0 % Final     Eosinophil Rel %   Date Value Ref Range Status   06/04/2018 1.1 0.0 - 5.0 % Final     Eosinophil %   Date Value Ref Range  "Status   12/13/2024 1.2 0.3 - 6.2 % Final     Basophil Rel %   Date Value Ref Range Status   06/04/2018 0.7 0.0 - 1.0 % Final     Basophil %   Date Value Ref Range Status   12/13/2024 0.4 0.0 - 1.5 % Final     Immature Grans %   Date Value Ref Range Status   12/13/2024 0.3 0.0 - 0.5 % Final     Neutrophils Absolute   Date Value Ref Range Status   06/04/2018 4.0 1.5 - 7.5 K/uL Final     Neutrophils, Absolute   Date Value Ref Range Status   12/13/2024 4.63 1.70 - 7.00 10*3/mm3 Final     Lymphocytes Absolute   Date Value Ref Range Status   06/04/2018 1.1 1.1 - 4.5 K/uL Final     Lymphocytes, Absolute   Date Value Ref Range Status   12/13/2024 2.30 0.70 - 3.10 10*3/mm3 Final     Monocytes Absolute   Date Value Ref Range Status   06/04/2018 0.30 0.00 - 0.90 K/uL Final     Monocytes, Absolute   Date Value Ref Range Status   12/13/2024 0.46 0.10 - 0.90 10*3/mm3 Final     Eosinophils Absolute   Date Value Ref Range Status   06/04/2018 0.10 0.00 - 0.60 K/uL Final     Eosinophils, Absolute   Date Value Ref Range Status   12/13/2024 0.09 0.00 - 0.40 10*3/mm3 Final     Basophils Absolute   Date Value Ref Range Status   06/04/2018 0.00 0.00 - 0.20 K/uL Final     Basophils, Absolute   Date Value Ref Range Status   12/13/2024 0.03 0.00 - 0.20 10*3/mm3 Final     Immature Grans, Absolute   Date Value Ref Range Status   12/13/2024 0.02 0.00 - 0.05 10*3/mm3 Final     nRBC   Date Value Ref Range Status   12/13/2024 0.0 0.0 - 0.2 /100 WBC Final         OBJECTIVE:  Visit Vitals  /98 (BP Location: Left arm, Patient Position: Sitting, Cuff Size: Adult)   Pulse 78   Temp 97.8 °F (36.6 °C) (Temporal)   Ht 165.1 cm (65\")   Wt 83 kg (183 lb)   LMP 11/01/2023   SpO2 98%   BMI 30.45 kg/m²      Physical Exam  Vitals reviewed.   Constitutional:       General: She is not in acute distress.     Appearance: She is well-developed. She is not diaphoretic.   HENT:      Head: Normocephalic and atraumatic.   Eyes:      General: No scleral icterus.   "   Pupils: Pupils are equal, round, and reactive to light.   Neck:      Thyroid: No thyromegaly.      Trachea: Phonation normal.   Cardiovascular:      Rate and Rhythm: Normal rate.   Pulmonary:      Effort: No respiratory distress.   Skin:     Coloration: Skin is not jaundiced or pale.      Findings: Bruising present. No erythema.   Neurological:      Mental Status: She is alert.   Psychiatric:         Mood and Affect: Mood is anxious.         Speech: Speech is rapid and pressured and tangential.         Behavior: Behavior normal.         Thought Content: Thought content normal.         Judgment: Judgment normal.       Physical Exam  Eyes: No scleral icterus  Skin: No jaundice    Results  Labs   - ALEXEI test: Positive    Assessment/Plan      Diagnoses and all orders for this visit:    1. Myofascial pain syndrome (Primary)    2. Essential hypertension  -     Comprehensive metabolic panel; Future    3. Anxiety  -     Ambulatory Referral to Psychiatry    4. Attention deficit hyperactivity disorder (ADHD), predominantly inattentive type  -     Ambulatory Referral to Psychiatry    5. Easy bruisability  -     CBC & Differential; Future  -     Protime-INR; Future    6. Positive ALEXEI (antinuclear antibody)  -     Ambulatory Referral to Rheumatology    7. Arthralgia, unspecified joint  -     Ambulatory Referral to Rheumatology      Assessment & Plan  1. Hypertension.  - Blood pressure readings at home have been elevated, with recent measurements including 138/98, 134/84, 141/94, and 126/92.  - Current Adderall dosage may be contributing to elevated blood pressure.  - Discussion about reducing Adderall dosage and referral to Dr. Parker at [facility].  - Liver enzyme tests will be conducted today.    2. Anxiety.  - Anxiety levels are reported to be the worst they have ever been.  - Current Adderall dosage may be exacerbating anxiety symptoms.  - Discussion about reducing Adderall dosage and referral to   Elena at [facility].    3. Attention Deficit Hyperactivity Disorder (ADHD).  - Current Adderall dosage is 40 mg twice a day.  - Adderall may be contributing to elevated blood pressure and anxiety.  - Discussion about reducing Adderall dosage and referral to Dr. Parker at [facility].    4. Myofascial Pain.  - Currently on Lyrica for myofascial pain.  - Consent and agreement form will be provided to continue prescribing Lyrica.  - Patient reports worsening symptoms and new onset of yellow blotches in vision.  Recommend she contact her ophthalmologist    5. Easy Bruising.  - Reports new onset of easy bruising with multiple bruises noted.  - Lab work will be conducted today to evaluate platelet count and ensure clotting factors are within normal limits.    A total of 35 minutes was spent on the day of visit    Return in about 6 months (around 1/7/2026) for Annual physical 30 minutes.      RONEY Mackay MD  14:02 CDT  7/7/2025   Electronically signed    Patient or patient representative verbalized consent for the use of Ambient Listening during the visit with  KEMAL Mackay MD for chart documentation. 7/7/2025  15:35 CDT

## 2025-07-15 ENCOUNTER — OFFICE VISIT (OUTPATIENT)
Dept: OTOLARYNGOLOGY | Facility: CLINIC | Age: 45
End: 2025-07-15
Payer: COMMERCIAL

## 2025-07-15 ENCOUNTER — PROCEDURE VISIT (OUTPATIENT)
Dept: OTOLARYNGOLOGY | Facility: CLINIC | Age: 45
End: 2025-07-15
Payer: COMMERCIAL

## 2025-07-15 VITALS
HEIGHT: 65 IN | SYSTOLIC BLOOD PRESSURE: 119 MMHG | BODY MASS INDEX: 30.49 KG/M2 | DIASTOLIC BLOOD PRESSURE: 77 MMHG | WEIGHT: 183 LBS

## 2025-07-15 DIAGNOSIS — R53.83 FATIGUE, UNSPECIFIED TYPE: ICD-10-CM

## 2025-07-15 DIAGNOSIS — K90.49 FOOD INTOLERANCE: ICD-10-CM

## 2025-07-15 DIAGNOSIS — J30.9 ALLERGIC RHINITIS, UNSPECIFIED SEASONALITY, UNSPECIFIED TRIGGER: Primary | ICD-10-CM

## 2025-07-15 DIAGNOSIS — G47.33 OBSTRUCTIVE SLEEP APNEA: ICD-10-CM

## 2025-07-15 DIAGNOSIS — J34.89 SINUS PAIN: ICD-10-CM

## 2025-07-15 DIAGNOSIS — R06.83 SNORING: ICD-10-CM

## 2025-07-15 DIAGNOSIS — Z01.10 HEARING WITHIN NORMAL LIMITS IN BOTH EARS: Primary | ICD-10-CM

## 2025-07-15 NOTE — PROGRESS NOTES
AUDIOMETRIC EVALUATION      Name:  Gladys Mcmillan  :  1980  Age:  45 y.o.  Date of Evaluation:  07/15/2025       History:  Ms. Mcmillan is seen today for a hearing evaluation due to decreased hearing of the right ear at the request of Zaid Staples MD.    Audiologic Information:  Concerns for Hearing: Bilateral, but right side is significantly worse. Decreased hearing is affecting Pt socially. Is worst with background noise.   PETs: no  Other otologic surgical history: no  Aural Pressure/Fullness: no  Otalgia: no  Otorrhea: no  Tinnitus: Mostly ringing on the right side for minutes at a time   Dizziness: Off-balance dizziness with room spinning sensation on rare occasions. No obvious trigger  Noise Exposure: no  Family history of hearing loss: Maternal aunt hearing aids before age 40   Head trauma requiring hospital stay: Concussion from a fall    Chemotherapy: no  Other significant history: high blood pressure, sleep apnea    **Case history obtained in office and through EMR system      EVALUATION:        RESULTS:    Otoscopic Evaluation:  Right: clear canal, tympanic membrane visualized  Left: clear canal, tympanic membrane visualized    Tympanometry (226 Hz):  Right: Type As  Left: Type A    Pure Tone Audiometry:    Bilateral: hearing within normal limits     Speech Audiometry:   Right: Speech Reception Threshold (SRT) was obtained at 10 dB HL  Word Recognition scores - Excellent (100)% at 50 dB, using NU-6 List 1A with 10 words  Left: Speech Reception Threshold (SRT) was obtained at 5 dB HL  Word Recognition scores - Excellent (100)% at 50 dB, using NU-6 List 2A with 10 words  SRT/PTA in good agreement bilaterally.    IMPRESSIONS:    For the right ear tympanometry showed normal middle ear pressure and static compliance, consistent with normal middle ear function.    For the left ear tympanometry showed normal middle ear pressure and static compliance, consistent with normal middle ear  function.     Pure tone thresholds for both ears show hearing within normal limits, suggesting normal outer/middle ear function and normal cochlear/retrocochlear function.     Patient was counseled with regard to the findings.    Diagnosis:  1. Hearing within normal limits in both ears         RECOMMENDATIONS/PLAN:  Follow-up recommendations per Zaid Staples MD.  Practice good communication strategies to assist with everyday listening (eye contact with speakers, reduce background noise, encourage others to communicate clearly and slowly).  Repeat hearing evaluation if changes in hearing are noted or concerns arise.  Discussed results and recommendations with patient. Questions were addressed and the patient was encouraged to contact our department should concerns arise.        Ting Villanueva, CCC-A  Doctor of Audiology

## 2025-07-15 NOTE — PROGRESS NOTES
YOB: 1980  Location: [facility]  Location Address: [address] [place] [address], [facility], [address] [place] [address]  Location Phone: [phone]    Chief Complaint   Patient presents with    Follow up on ct scan       History of Present Illness  Gladys Mcmillan is a 45 y.o. female.  Gladys Mcmillan is here for follow up of ENT complaints. The patient has had problems with reflux, nasal congestion and decreased hearing   Patient states she has not been using nasal sprays daily but has been using intermittently   Patient feels as if reflux has improved since starting reflux medications   She is currently taking protonix twice daily before meals   She feels as if symptoms of reflux have improved, but she continues to have reflux symptoms and throat clearing   She was seen by gastroenterology and had endoscopy performed and was diagnosed with esophagitis      She also has a history of sleep apnea and is not currently able to tolerate cpap machine   She has tried various different masks/settings without significant improvement     Patient was seen by family allergy and asthma for strawberry allergy due to reaction at one point in time and was found to not be allergic     CT Sinus Without Contrast (2025 16:00)   Past Medical History:   Diagnosis Date    ADD (attention deficit disorder)     Anxiety     Fibromyalgia, primary     Possibly?    GERD (gastroesophageal reflux disease)     Headache     HL (hearing loss)     Hypertension [id number]    Hypertension last spring    Irritable bowel syndrome     Kidney stone     Just once.    Low back pain     Migraine     Never diagnosed w/ migraines, but have headaches often.    Neuromuscular disorder     Tingling and cramping daily    Osteopenia     Ovarian cyst     PMS (premenstrual syndrome)     Preeclampsia     When I was pregnant with my 2nd child    Recurrent pregnancy loss, antepartum condition or complication     I had 2 miscarriages years ago    Rh  incompatibility     Trauma 2017 & 2020    Concussion from a fall/ broken hip    Urinary tract infection     UTI a couple times in the past    Varicella     Visual impairment     Vitamin B 12 deficiency        Past Surgical History:   Procedure Laterality Date    BREAST BIOPSY Right     benign    CHOLECYSTECTOMY      COLONOSCOPY  03/18/2016    Hemorrhoids otherwise normal exam repeat in 10 years    COLONOSCOPY N/A 05/21/2021    Dr. sargent- One 6 mm hyperplastic  polyp at 70 cm proximal to the anus, - One 3 mm polyp at 40 cm proximal to the anus,Superficial fragment of benign colonic mucosa demonstrating mild glandular hyperplastic changes-    D & C WITH SUCTION  1998    ENDOSCOPY      ENDOSCOPY N/A 12/03/2024    Procedure: ESOPHAGOGASTRODUODENOSCOPY WITH ANESTHESIA;  Surgeon: Brian Sargent MD;  Location: [facility] ENDOSCOPY;  Service: Gastroenterology;  Laterality: N/A;  pre op: GERD  post op: gerd  pcp: KEMAL Mackay MD    FRACTURE SURGERY      HIP FRACTURE SURGERY Left     HYSTERECTOMY      TVH/    LAPAROSCOPIC ASSISTED VAGINAL HYSTERECTOMY SALPINGO OOPHORECTOMY      LAPAROSCOPIC CHOLECYSTECTOMY  2003    TONSILLECTOMY AND ADENOIDECTOMY      VAGINAL HYSTERECTOMY         Outpatient Medications Marked as Taking for the 7/15/25 encounter (Office Visit) with Zaid Staples MD   Medication Sig Dispense Refill    AMPHETAMINE-DEXTROAMPHETAMINE PO Take 40 mg by mouth 2 (Two) Times a Day.      azelastine (ASTELIN) 0.1 % nasal spray Administer 2 sprays into the nostril(s) as directed by provider 2 (Two) Times a Day. Use in each nostril as directed 30 mL 11    busPIRone (BUSPAR) 15 MG tablet Take 1 tablet by mouth 3 (Three) Times a Day. 270 tablet 3    clonazePAM (KlonoPIN) 0.5 MG tablet TAKE 1-2 TABLET BY MOUTH DAILY AS NEEDED FOR ANXIETY      EPINEPHrine (EPIPEN) 0.3 MG/0.3ML solution auto-injector injection       FLUoxetine (PROzac) 40 MG capsule Take 20 mg by mouth 3 (Three) Times a Day.      fluticasone  (FLONASE) 50 MCG/ACT nasal spray Administer 2 sprays into the nostril(s) as directed by provider Daily. 16 g 11    pantoprazole (PROTONIX) 40 MG EC tablet TAKE 1 TABLET BY MOUTH TWICE A DAY BEFORE MEALS 60 tablet 3    pregabalin (LYRICA) 75 MG capsule TAKE 2 CAPSULES BY MOUTH 2 TIMES A DAY. 120 capsule 3       Azithromycin, Flavoring agent (non-screening), Strawberry, and Butorphanol    Family History   Problem Relation Age of Onset    Thyroid disease Mother     Hyperlipidemia Mother     Miscarriages / Stillbirths Mother     Anxiety disorder Father     Arthritis Father         Seronegative Rheumatoid Athritis    No Known Problems Brother     Breast cancer Maternal Grandmother 60            Diabetes Maternal Grandmother             Arthritis Maternal Grandmother             Cancer Maternal Grandmother         Breast/Lukemia    Depression Maternal Grandmother             Hearing loss Maternal Grandmother             Heart disease Maternal Grandmother             Colon cancer Maternal Grandfather 60            Cancer Maternal Grandfather         Colon    COPD Maternal Grandfather             Hearing loss Maternal Grandfather             Hyperlipidemia Maternal Grandfather             Colon cancer Paternal Grandmother             Skin cancer Paternal Grandmother     Colon polyps Paternal Grandmother     Cancer Paternal Grandmother         Colon    Melanoma Paternal Grandmother             Diabetes Paternal Grandfather 60            Cancer Paternal Grandfather 60    Hearing loss Maternal Aunt         Before 40    Ovarian cancer Neg Hx     Uterine cancer Neg Hx        Social History     Socioeconomic History    Marital status:    Tobacco Use    Smoking status: Every Day     Types: Electronic Cigarette     Start date: 2000    Smokeless tobacco: Current    Tobacco comments:     Vapes   Vaping Use    Vaping  status: Every Day    Substances: Nicotine    Devices: Disposable   Substance and Sexual Activity    Alcohol use: No    Drug use: No    Sexual activity: Yes     Partners: Male     Birth control/protection: Vasectomy, Hysterectomy     Comment: partner had vasectomy        Review of Systems   Constitutional:  Positive for fatigue.   HENT:  Positive for congestion.    Allergic/Immunologic: Positive for environmental allergies.   Psychiatric/Behavioral:  Positive for sleep disturbance.        Vitals:    07/15/25 1511   BP: 119/77       Body mass index is 30.45 kg/m².    Objective     Physical Exam  Vitals reviewed.   Constitutional:       Appearance: She is obese.   HENT:      Head: Normocephalic.      Right Ear: Tympanic membrane, ear canal and external ear normal.      Left Ear: Tympanic membrane, ear canal and external ear normal.      Nose: Septal deviation present.      Mouth/Throat:      Lips: Pink.      Mouth: Mucous membranes are moist.      Pharynx: Uvula midline.      Comments: Tonsils surgically absent     Neurological:      Mental Status: She is alert.         Assessment & Plan   Diagnoses and all orders for this visit:    1. Allergic rhinitis, unspecified seasonality, unspecified trigger (Primary)  -     Intradermal Allergy Testing  -     Prick Testing (multi-Test)  -     Allergens (22) Foods; Future    2. Food intolerance  -     Intradermal Allergy Testing  -     Prick Testing (multi-Test)  -     Allergens (22) Foods; Future    3. Obstructive sleep apnea  -     Home Sleep Study; Future    4. Sinus pain    5. Fatigue, unspecified type  -     Home Sleep Study; Future    6. Snoring  -     Home Sleep Study; Future      * Surgery not found *  Orders Placed This Encounter   Procedures    Intradermal Allergy Testing     Release to patient:   Routine Release [[id number]]    Prick Testing (multi-Test)     Release to patient:   Routine Release [[id number]]    Allergens (22) Foods     Standing Status:   Future      Expected Date:   7/20/2025     Expiration Date:   7/15/2026     Release to patient:   Routine Release [[id number]]    Home Sleep Study     Standing Status:   Future     Expected Date:   7/20/2025     Expiration Date:   7/15/2026     May take own meds:   No     If any contraindications for HST are checked, patient may be recommended for in-lab testing. HST is indicated for patients in whom KAMILAH is  suspected diagnosis.:   Does not apply     Release to patient:   Routine Release [[id number]]     No follow-ups on file.     Continue nasal sprays and reflux medications as directed  Allergy testing as discussed   Repeat sleep study     There are no Patient Instructions on file for this visit.

## 2025-07-16 ENCOUNTER — TELEPHONE (OUTPATIENT)
Dept: INTERNAL MEDICINE | Facility: CLINIC | Age: 45
End: 2025-07-16
Payer: COMMERCIAL

## 2025-07-16 DIAGNOSIS — M25.50 ARTHRALGIA, UNSPECIFIED JOINT: ICD-10-CM

## 2025-07-16 DIAGNOSIS — R76.8 POSITIVE ANA (ANTINUCLEAR ANTIBODY): Primary | ICD-10-CM

## 2025-07-16 NOTE — TELEPHONE ENCOUNTER
Attempted to contact Pt in regards to Rheumatology referral to [facility] (Dr. Gentry Anderson). Pt is scheduled with Dr. Anderson on 12/3/25 @ 2:15pm. Pt can contact their office to check for cancellations at [phone], LVM.

## 2025-08-07 RX ORDER — PANTOPRAZOLE SODIUM 40 MG/1
40 TABLET, DELAYED RELEASE ORAL
Qty: 60 TABLET | Refills: 3 | Status: SHIPPED | OUTPATIENT
Start: 2025-08-07

## 2025-08-13 ENCOUNTER — PROCEDURE VISIT (OUTPATIENT)
Dept: OTOLARYNGOLOGY | Facility: CLINIC | Age: 45
End: 2025-08-13
Payer: COMMERCIAL

## 2025-08-13 ENCOUNTER — LAB (OUTPATIENT)
Dept: LAB | Facility: HOSPITAL | Age: 45
End: 2025-08-13
Payer: COMMERCIAL

## 2025-08-13 DIAGNOSIS — J30.9 ALLERGIC RHINITIS, UNSPECIFIED SEASONALITY, UNSPECIFIED TRIGGER: Primary | ICD-10-CM

## 2025-08-13 DIAGNOSIS — J30.9 ALLERGIC RHINITIS, UNSPECIFIED SEASONALITY, UNSPECIFIED TRIGGER: ICD-10-CM

## 2025-08-13 PROCEDURE — 86003 ALLG SPEC IGE CRUDE XTRC EA: CPT

## 2025-08-13 PROCEDURE — 86235 NUCLEAR ANTIGEN ANTIBODY: CPT

## 2025-08-13 PROCEDURE — 86225 DNA ANTIBODY NATIVE: CPT

## 2025-08-13 PROCEDURE — 83516 IMMUNOASSAY NONANTIBODY: CPT

## 2025-08-13 PROCEDURE — 36415 COLL VENOUS BLD VENIPUNCTURE: CPT

## 2025-08-15 LAB
CENTROMERE B AB SER-ACNC: <0.2 AI (ref 0–0.9)
CHROMATIN AB SERPL-ACNC: <0.2 AI (ref 0–0.9)
DSDNA AB SER-ACNC: 1 IU/ML (ref 0–9)
ENA JO1 AB SER-ACNC: <0.2 AI (ref 0–0.9)
ENA RNP AB SER-ACNC: <0.2 AI (ref 0–0.9)
ENA SCL70 AB SER-ACNC: <0.2 AI (ref 0–0.9)
ENA SM AB SER-ACNC: <0.2 AI (ref 0–0.9)
ENA SM+RNP AB SER-ACNC: <0.2 AI (ref 0–0.9)
ENA SS-A AB SER-ACNC: 0.3 AI (ref 0–0.9)
ENA SS-B AB SER-ACNC: <0.2 AI (ref 0–0.9)
Lab: NORMAL
RIBOSOMAL P AB SER-ACNC: <0.2 AI (ref 0–0.9)

## 2025-08-18 ENCOUNTER — TELEPHONE (OUTPATIENT)
Dept: OTOLARYNGOLOGY | Facility: CLINIC | Age: 45
End: 2025-08-18
Payer: COMMERCIAL

## 2025-08-19 LAB
BAKER'S YEAST IGE QN: <0.1 KU/L
BARLEY IGE QN: <0.1 KU/L
CASEIN IGE QN: <0.1 KU/L
CHEESE MOLD IGE QN: <0.1 KU/L
CLAM IGE QN: <0.1 KU/L
COCOA IGE QN: <0.1 KU/L
CODFISH IGE QN: <0.1 KU/L
CORN IGE QN: <0.1 KU/L
COW MILK IGE QN: <0.1 KU/L
CRAB IGE QN: <0.1 KU/L
EGG WHITE IGE QN: <0.1 KU/L
GLUTEN IGE QN: <0.1 KU/L
OAT IGE QN: <0.1 KU/L
PEA IGE QN: <0.1 KU/L
PEANUT IGE QN: <0.1 KU/L
PECAN/HICK NUT IGE QN: <0.1 KU/L
RYE IGE QN: <0.1 KU/L
SERVICE CMNT-IMP: NORMAL
SESAME SEED IGE QN: <0.1 KU/L
SHRIMP IGE QN: <0.1 KU/L
SOYBEAN IGE QN: <0.1 KU/L
WALNUT IGE QN: <0.1 KU/L
WHEAT IGE QN: <0.1 KU/L

## 2025-08-21 LAB
CODFISH IGE QN: <0.1 KU/L
FLOUNDER IGE QN: <0.1 KU/L
HALIBUT IGE QN: <0.1 KU/L
MACKEREL IGE QN: <0.1 KU/L
SALMON IGE QN: <0.1 KU/L
SERVICE CMNT-IMP: NORMAL
SOLE IGE QN: <0.1 KU/L
SQUID IGE QN: <0.1 KU/L
SWORDFISH IGE QN: <0.1 KU/L
TROUT IGE QN: <0.1 KU/L
TUNA IGE QN: <0.1 KU/L

## 2025-08-22 LAB
A ALTERNATA IGE QN: <0.1 KU/L
A FUMIGATUS IGE QN: <0.1 KU/L
AMER BEECH IGE QN: <0.1 KU/L
APPLE IGE QN: <0.1 KU/L
APRICOT IGE QN: <0.1 KU/L
ASPARAGUS IGE QN: <0.1 KU/L
AVOCADO IGE QN: <0.1 KU/L
BANANA IGE QN: <0.1 KU/L
BEEF IGE QN: <0.1 KU/L
BERMUDA GRASS IGE QN: <0.1 KU/L
BLACKBERRY IGE QN: <0.1 KU/L
BLUEBERRY IGE QN: <0.1 KU/L
BOXELDER IGE QN: <0.1 KU/L
BROCCOLI IGE QN: <0.1 KU/L
C ALBICANS IGE QN: <0.1 KU/L
C HERBARUM IGE QN: <0.1 KU/L
CABBAGE IGE QN: <0.1 KU/L
CALIF WALNUT POLN IGE QN: <0.1 KU/L
CARROT IGE QN: <0.1 KU/L
CAT DANDER IGE QN: <0.1 KU/L
CAULIFLOWER IGE QN: <0.1 KU/L
CELERY IGE QN: <0.1 KU/L
CHERRY IGE QN: <0.1 KU/L
CHICKEN FEATHER IGE QN: <0.1 KU/L
CHICKEN MEAT IGE QN: <0.1 KU/L
CMN PIGWEED IGE QN: <0.1 KU/L
COCKLEBUR IGE QN: <0.1 KU/L
COCONUT IGE QN: <0.1 KU/L
COMMON RAGWEED IGE QN: <0.1 KU/L
CORN IGE QN: <0.1 KU/L
COTTONWOOD IGE QN: <0.1 KU/L
CUCUMBER IGE QN: <0.1 KU/L
D FARINAE IGE QN: <0.1 KU/L
D PTERONYSS IGE QN: <0.1 KU/L
DOG DANDER IGE QN: <0.1 KU/L
DUCK FEATHER IGE QN: <0.1 KU/L
E PURPURASCENS IGE QN: <0.1 KU/L
EGGPLANT IGE QN: <0.1 KU/L
ENGL PLANTAIN IGE QN: <0.1 KU/L
F MONILIFORME IGE QN: <0.1 KU/L
GIANT RAGWEED IGE QN: <0.1 KU/L
GOOSE FEATHER IGE QN: <0.1 KU/L
GOOSEFOOT IGE QN: <0.1 KU/L
GRAPE IGE QN: <0.1 KU/L
GRAPEFRUIT IGE QN: <0.1 KU/L
GREEN BEAN IGE QN: <0.1 KU/L
GREEN PEPPER IGE QN: <0.1 KU/L
HORSE EPITH IGE QN: <0.1 KU/L
JOHNSON GRASS IGE QN: <0.1 KU/L
KENT BLUE GRASS IGE QN: <0.1 KU/L
KIWIFRUIT IGE QN: <0.1 KU/L
LAMB IGE QN: <0.1 KU/L
LEMON IGE QN: <0.1 KU/L
LENTILS IGE QN: <0.1 KU/L
LETTUCE IGE QN: <0.1 KU/L
LIME IGE QN: <0.1 KU/L
M RACEMOSUS IGE QN: <0.1 KU/L
MANDARIN IGE QN: <0.1 KU/L
MANGO IGE QN: <0.1 KU/L
MELON IGE QN: <0.1 KU/L
MUGWORT IGE QN: <0.1 KU/L
MUSHROOM IGE QN: <0.1 KU/L
ONION IGE QN: <0.1 KU/L
ORANGE IGE QN: <0.1 KU/L
P BETAE IGE QN: <0.1 KU/L
P NOTATUM IGE QN: <0.1 KU/L
PAPAYA IGE QN: <0.1 KU/L
PEA IGE QN: <0.1 KU/L
PEACH IGE QN: <0.1 KU/L
PEAR IGE QN: <0.1 KU/L
PECAN/HICK TREE IGE QN: <0.1 KU/L
PINEAPPLE IGE QN: <0.1 KU/L
PLUM IGE QN: <0.1 KU/L
PORK IGE QN: <0.1 KU/L
POTATO IGE QN: <0.1 KU/L
R NIGRICANS IGE QN: <0.1 KU/L
RABBIT MEAT IGE QN: <0.1 KU/L
SERVICE CMNT-IMP: NORMAL
SHEEP SORREL IGE QN: <0.1 KU/L
SILVER BIRCH IGE QN: <0.1 KU/L
SPINACH IGE QN: <0.1 KU/L
STRAWBERRY IGE QN: <0.1 KU/L
SWEET POTATO IGE QN: <0.1 KU/L
T RUBRUM IGE QN: <0.1 KU/L
TOMATO IGE QN: <0.1 KU/L
WEST RAGWEED IGE QN: <0.1 KU/L
WHITE ASH IGE QN: <0.1 KU/L
WHITE BEAN IGE QN: <0.1 KU/L
WHITE ELM IGE QN: <0.1 KU/L
WHITE OAK IGE QN: <0.1 KU/L

## 2025-08-26 PROBLEM — G47.33 OSA (OBSTRUCTIVE SLEEP APNEA): Status: ACTIVE | Noted: 2025-08-26

## 2025-08-27 ENCOUNTER — FOLLOWUP TELEPHONE ENCOUNTER (OUTPATIENT)
Dept: SLEEP CENTER | Age: 45
End: 2025-08-27

## (undated) DEVICE — Device: Brand: DEFENDO AIR/WATER/SUCTION AND BIOPSY VALVE

## (undated) DEVICE — THE CHANNEL CLEANING BRUSH IS A NYLON FLEXI BRUSH ATTACHED TO A FLEXIBLE PLASTIC SHEATH DESIGNED TO SAFELY REMOVE DEBRIS FROM FLEXIBLE ENDOSCOPES.

## (undated) DEVICE — SENSR O2 OXIMAX FNGR A/ 18IN NONSTR

## (undated) DEVICE — THE SINGLE USE ETRAP – POLYP TRAP IS USED FOR SUCTION RETRIEVAL OF ENDOSCOPICALLY REMOVED POLYPS.: Brand: ETRAP

## (undated) DEVICE — YANKAUER,BULB TIP WITH VENT: Brand: ARGYLE

## (undated) DEVICE — TBG SMPL FLTR LINE NASL 02/C02 A/ BX/100

## (undated) DEVICE — SNAR POLYP SENSATION MICRO OVL 13 240X40

## (undated) DEVICE — CUFF,BP,DISP,1 TUBE,ADULT,HP: Brand: MEDLINE

## (undated) DEVICE — MASK,OXYGEN,MED CONC,ADLT,7' TUB, UC: Brand: PENDING

## (undated) DEVICE — CONMED SCOPE SAVER BITE BLOCK, 20X27 MM: Brand: SCOPE SAVER